# Patient Record
Sex: MALE | Race: WHITE | NOT HISPANIC OR LATINO | Employment: OTHER | URBAN - METROPOLITAN AREA
[De-identification: names, ages, dates, MRNs, and addresses within clinical notes are randomized per-mention and may not be internally consistent; named-entity substitution may affect disease eponyms.]

---

## 2017-06-22 ENCOUNTER — OFFICE VISIT (OUTPATIENT)
Dept: URGENT CARE | Facility: CLINIC | Age: 77
End: 2017-06-22
Payer: MEDICARE

## 2017-06-22 PROCEDURE — G0463 HOSPITAL OUTPT CLINIC VISIT: HCPCS

## 2017-06-22 PROCEDURE — 99203 OFFICE O/P NEW LOW 30 MIN: CPT

## 2017-06-24 ENCOUNTER — OFFICE VISIT (OUTPATIENT)
Dept: URGENT CARE | Facility: CLINIC | Age: 77
End: 2017-06-24
Payer: MEDICARE

## 2017-06-24 PROCEDURE — 99213 OFFICE O/P EST LOW 20 MIN: CPT

## 2017-06-24 PROCEDURE — G0463 HOSPITAL OUTPT CLINIC VISIT: HCPCS

## 2018-05-01 ENCOUNTER — OFFICE VISIT (OUTPATIENT)
Dept: URGENT CARE | Facility: CLINIC | Age: 78
End: 2018-05-01
Payer: MEDICARE

## 2018-05-01 VITALS
DIASTOLIC BLOOD PRESSURE: 85 MMHG | SYSTOLIC BLOOD PRESSURE: 156 MMHG | HEART RATE: 66 BPM | OXYGEN SATURATION: 95 % | TEMPERATURE: 97.6 F | RESPIRATION RATE: 18 BRPM

## 2018-05-01 DIAGNOSIS — R35.0 URINARY FREQUENCY: Primary | ICD-10-CM

## 2018-05-01 DIAGNOSIS — R30.0 DYSURIA: ICD-10-CM

## 2018-05-01 LAB
SL AMB  POCT GLUCOSE, UA: ABNORMAL
SL AMB LEUKOCYTE ESTERASE,UA: ABNORMAL
SL AMB POCT BILIRUBIN,UA: 0.2
SL AMB POCT BLOOD,UA: ABNORMAL
SL AMB POCT CLARITY,UA: ABNORMAL
SL AMB POCT COLOR,UA: ABNORMAL
SL AMB POCT KETONES,UA: ABNORMAL
SL AMB POCT NITRITE,UA: ABNORMAL
SL AMB POCT PH,UA: 6
SL AMB POCT SPECIFIC GRAVITY,UA: 1.02
SL AMB POCT URINE PROTEIN: ABNORMAL
SL AMB POCT UROBILINOGEN: ABNORMAL

## 2018-05-01 PROCEDURE — 87086 URINE CULTURE/COLONY COUNT: CPT | Performed by: NURSE PRACTITIONER

## 2018-05-01 PROCEDURE — 87077 CULTURE AEROBIC IDENTIFY: CPT | Performed by: NURSE PRACTITIONER

## 2018-05-01 PROCEDURE — 87186 SC STD MICRODIL/AGAR DIL: CPT | Performed by: NURSE PRACTITIONER

## 2018-05-01 PROCEDURE — 99213 OFFICE O/P EST LOW 20 MIN: CPT | Performed by: NURSE PRACTITIONER

## 2018-05-01 PROCEDURE — G0463 HOSPITAL OUTPT CLINIC VISIT: HCPCS | Performed by: NURSE PRACTITIONER

## 2018-05-01 PROCEDURE — 87147 CULTURE TYPE IMMUNOLOGIC: CPT | Performed by: NURSE PRACTITIONER

## 2018-05-01 RX ORDER — SULFAMETHOXAZOLE AND TRIMETHOPRIM 800; 160 MG/1; MG/1
1 TABLET ORAL 2 TIMES DAILY
Qty: 14 TABLET | Refills: 0 | Status: SHIPPED | OUTPATIENT
Start: 2018-05-01 | End: 2018-05-08

## 2018-05-01 RX ORDER — ASPIRIN 81 MG/1
81 TABLET, CHEWABLE ORAL
Status: ON HOLD | COMMUNITY
Start: 2017-07-22 | End: 2020-02-05 | Stop reason: SDUPTHER

## 2018-05-01 RX ORDER — ALLOPURINOL 300 MG/1
300 TABLET ORAL
COMMUNITY
Start: 2012-10-09 | End: 2020-02-20

## 2018-05-01 NOTE — PATIENT INSTRUCTIONS

## 2018-05-01 NOTE — PROGRESS NOTES
[facility]        NAME: Kia Del Cid is a 70 y o  male  : 1940    MRN: [id number]  DATE: May 1, 2018  TIME: 3:43 PM    Assessment and Plan   Urinary frequency [R35 0]  1  Urinary frequency  POCT urine dip    Urine culture   2  Dysuria  POCT urine dip    Urine culture    sulfamethoxazole-trimethoprim (BACTRIM DS) 800-160 mg per tablet         Patient Instructions       Follow up with PCP in 3-5 days  Proceed to  ER if symptoms worsen  Chief Complaint     Chief Complaint   Patient presents with    Urinary Frequency     Pt c/o urinary frequency since yesterday  History of Present Illness       Urinary Frequency    The current episode started yesterday  The problem occurs every urination  The problem has been unchanged  The quality of the pain is described as burning  There has been no fever  He is sexually active  There is no history of pyelonephritis  Associated symptoms include frequency, hematuria (just started) and urgency  Pertinent negatives include no flank pain, hesitancy or vomiting  Review of Systems   Review of Systems   Constitutional: Negative  Negative for fatigue  HENT: Negative  Eyes: Negative  Respiratory: Negative  Cardiovascular: Negative  Gastrointestinal: Negative  Negative for vomiting  Endocrine: Negative  Genitourinary: Positive for frequency, hematuria (just started) and urgency  Negative for flank pain and hesitancy  Musculoskeletal: Negative  Neurological: Negative  Hematological: Negative  Psychiatric/Behavioral: Negative            Current Medications       Current Outpatient Prescriptions:     allopurinol (ZYLOPRIM) 300 mg tablet, Take 300 mg by mouth, Disp: , Rfl:     aspirin 81 mg chewable tablet, Chew 81 mg, Disp: , Rfl:     metoprolol tartrate (LOPRESSOR) 25 mg tablet, Take by mouth, Disp: , Rfl:     sulfamethoxazole-trimethoprim (BACTRIM DS) 800-160 mg per tablet, Take 1 tablet by mouth 2 (two) times a day for 7 days, Disp: 14 tablet, Rfl: 0    Current Allergies     Allergies as of 05/01/2018 - Reviewed 05/01/2018   Allergen Reaction Noted    Lisinopril Anaphylaxis 04/16/2012    Bacitracin Rash 08/21/2014            The following portions of the patient's history were reviewed and updated as appropriate: allergies, current medications, past family history, past medical history, past social history, past surgical history and problem list      No past medical history on file  No past surgical history on file  No family history on file  Medications have been verified  Objective   /85   Pulse 66   Temp 97 6 °F (36 4 °C)   Resp 18   SpO2 95%        Physical Exam     Physical Exam   Constitutional: He appears well-developed and well-nourished  No distress  HENT:   Head: Normocephalic and atraumatic  Right Ear: External ear normal    Left Ear: External ear normal    Nose: Nose normal    Mouth/Throat: Oropharynx is clear and moist    Eyes: Conjunctivae and EOM are normal  Pupils are equal, round, and reactive to light  Neck: Normal range of motion  Neck supple  Pulmonary/Chest: Effort normal and breath sounds normal    Abdominal: Soft  Bowel sounds are normal  He exhibits no distension  There is tenderness (supra pubic)  Neurological: He is alert  He has normal reflexes  Skin: Skin is warm and dry  He is not diaphoretic  Psychiatric: He has a normal mood and affect  Nursing note and vitals reviewed

## 2018-05-03 LAB — BACTERIA UR CULT: ABNORMAL

## 2019-09-16 ENCOUNTER — OFFICE VISIT (OUTPATIENT)
Dept: URGENT CARE | Facility: CLINIC | Age: 79
End: 2019-09-16
Payer: MEDICARE

## 2019-09-16 VITALS
BODY MASS INDEX: 41.75 KG/M2 | SYSTOLIC BLOOD PRESSURE: 124 MMHG | RESPIRATION RATE: 20 BRPM | TEMPERATURE: 99 F | HEIGHT: 73 IN | DIASTOLIC BLOOD PRESSURE: 72 MMHG | HEART RATE: 72 BPM | OXYGEN SATURATION: 98 % | WEIGHT: 315 LBS

## 2019-09-16 DIAGNOSIS — R30.0 DYSURIA: ICD-10-CM

## 2019-09-16 DIAGNOSIS — N39.0 URINARY TRACT INFECTION WITH HEMATURIA, SITE UNSPECIFIED: Primary | ICD-10-CM

## 2019-09-16 DIAGNOSIS — R31.9 URINARY TRACT INFECTION WITH HEMATURIA, SITE UNSPECIFIED: Primary | ICD-10-CM

## 2019-09-16 LAB
SL AMB  POCT GLUCOSE, UA: NEGATIVE
SL AMB LEUKOCYTE ESTERASE,UA: ABNORMAL
SL AMB POCT BILIRUBIN,UA: NEGATIVE
SL AMB POCT BLOOD,UA: ABNORMAL
SL AMB POCT CLARITY,UA: ABNORMAL
SL AMB POCT COLOR,UA: YELLOW
SL AMB POCT KETONES,UA: NEGATIVE
SL AMB POCT NITRITE,UA: POSITIVE
SL AMB POCT PH,UA: 5
SL AMB POCT SPECIFIC GRAVITY,UA: 1.02
SL AMB POCT URINE PROTEIN: 100
SL AMB POCT UROBILINOGEN: 0.2

## 2019-09-16 PROCEDURE — 87086 URINE CULTURE/COLONY COUNT: CPT | Performed by: NURSE PRACTITIONER

## 2019-09-16 PROCEDURE — G0463 HOSPITAL OUTPT CLINIC VISIT: HCPCS | Performed by: NURSE PRACTITIONER

## 2019-09-16 PROCEDURE — 99213 OFFICE O/P EST LOW 20 MIN: CPT | Performed by: NURSE PRACTITIONER

## 2019-09-16 PROCEDURE — 87186 SC STD MICRODIL/AGAR DIL: CPT | Performed by: NURSE PRACTITIONER

## 2019-09-16 PROCEDURE — 87077 CULTURE AEROBIC IDENTIFY: CPT | Performed by: NURSE PRACTITIONER

## 2019-09-16 RX ORDER — SULFAMETHOXAZOLE AND TRIMETHOPRIM 800; 160 MG/1; MG/1
1 TABLET ORAL EVERY 12 HOURS SCHEDULED
Qty: 14 TABLET | Refills: 0 | Status: SHIPPED | OUTPATIENT
Start: 2019-09-16 | End: 2019-09-23

## 2019-09-16 NOTE — PROGRESS NOTES
[facility]        NAME: Yara Hogan is a 70 y o  male  : 1940    MRN: [id number]  DATE: 2019  TIME: 11:08 AM    Assessment and Plan   Urinary tract infection with hematuria, site unspecified [N39 0, R31 9]  1  Urinary tract infection with hematuria, site unspecified  sulfamethoxazole-trimethoprim (BACTRIM DS) 800-160 mg per tablet   2  Dysuria  POCT urine dip auto non-scope    Urine culture    CANCELED: POCT urine dip         Patient Instructions     Patient Instructions     Take the bactrim as ordered until completed  Eat yogurt or take a probiotic to restore good bacteria to your gut  Urinary Tract Infection in Men   AMBULATORY CARE:   A urinary tract infection (UTI)  is caused by bacteria that get inside your urinary tract  Most bacteria that enter your urinary tract come out when you urinate  If the bacteria stay in your urinary tract, you may get an infection  Your urinary tract includes your kidneys, ureters, bladder, and urethra  Urine is made in your kidneys, and it flows from the ureters to the bladder  Urine leaves the bladder through the urethra  A UTI is more common in your lower urinary tract, which includes your bladder and urethra  Common symptoms include the following:   · Urinating more often than usual, leaking urine, or waking from sleep to urinate    · Pain or burning when you urinate    · Pain or pressure in your lower abdomen or back    · Urine that smells bad    · Blood in your urine  Seek care immediately if:   · You are urinating very little or not at all  · You have a high fever with shaking chills  · You have side or back pain that gets worse  Contact your healthcare provider if:   · You have a mild fever  · You do not feel better after 2 days of taking antibiotics  · You are vomiting  · You have new symptoms, such as blood or pus in your urine  · You have questions or concerns about your condition or care    Treatment for a UTI  may include medicines to treat a bacterial infection  You may also need medicines to decrease pain and burning, or decrease the urge to urinate often  Prevent a UTI:   · Empty your bladder often  Urinate and empty your bladder as soon as you feel the need  Do not hold your urine for long periods of time  · Drink liquids as directed  Ask how much liquid to drink each day and which liquids are best for you  You may need to drink more liquids than usual to help flush out the bacteria  Do not drink alcohol, caffeine, or citrus juices  These can irritate your bladder and increase your symptoms  Your healthcare provider may recommend cranberry juice to help prevent a UTI  · Urinate after you have sex  This can help flush out bacteria passed during sex  · Do pelvic muscle exercises often  Pelvic muscle exercises may help you start and stop urinating  Strong pelvic muscles may help you empty your bladder easier  Squeeze these muscles tightly for 5 seconds like you are trying to hold back urine  Then relax for 5 seconds  Gradually work up to squeezing for 10 seconds  Do 3 sets of 15 repetitions a day, or as directed  Follow up with your healthcare provider as directed:  Write down your questions so you remember to ask them during your visits  © 2017 [employer]  Information is for End User's use only and may not be sold, redistributed or otherwise used for commercial purposes  All illustrations and images included in CareNotes® are the copyrighted property of [employer]  or David France  The above information is an  only  It is not intended as medical advice for individual conditions or treatments  Talk to your doctor, nurse or pharmacist before following any medical regimen to see if it is safe and effective for you  Follow up with PCP in 3-5 days  Proceed to  ER if symptoms worsen      Chief Complaint     Chief Complaint   Patient presents with   [employer] Possible UTI     burning and frequency of urination for 1 day         History of Present Illness       Patient reported burning and frequency starting last night  He states he does not get urinary infections often but has had them before, so he recognized the symptoms  He presents here to be seen      Review of Systems   Review of Systems   Genitourinary: Positive for dysuria and frequency  Negative for discharge, flank pain, hematuria and urgency  All other systems reviewed and are negative          Current Medications       Current Outpatient Medications:     amLODIPine (NORVASC) 5 mg tablet, Take 5 mg by mouth daily, Disp: , Rfl:     aspirin 81 mg chewable tablet, Chew 81 mg, Disp: , Rfl:     atorvastatin (LIPITOR) 40 mg tablet, Take 40 mg by mouth daily, Disp: , Rfl:     cholecalciferol (VITAMIN D3) 1,000 units tablet, Take 1,000 Units by mouth daily, Disp: , Rfl:     Meredith, Zingiber officinalis, (MEREDITH ROOT PO), Take by mouth, Disp: , Rfl:     metFORMIN (GLUCOPHAGE) 500 mg tablet, Take 500 mg by mouth, Disp: , Rfl:     metoprolol tartrate (LOPRESSOR) 25 mg tablet, Take by mouth, Disp: , Rfl:     omega-3-acid ethyl esters (LOVAZA) 1 g capsule, Take 2 g by mouth 2 (two) times a day, Disp: , Rfl:     allopurinol (ZYLOPRIM) 300 mg tablet, Take 300 mg by mouth, Disp: , Rfl:     sulfamethoxazole-trimethoprim (BACTRIM DS) 800-160 mg per tablet, Take 1 tablet by mouth every 12 (twelve) hours for 7 days, Disp: 14 tablet, Rfl: 0    Current Allergies     Allergies as of 09/16/2019 - Reviewed 09/16/2019   Allergen Reaction Noted    Lisinopril Anaphylaxis 04/16/2012    Bacitracin Rash 08/21/2014            The following portions of the patient's history were reviewed and updated as appropriate: allergies, current medications, past family history, past medical history, past social history, past surgical history and problem list      Past Medical History:   Diagnosis Date    Diabetes ([address] )     [facility] [address] [address] [facility]     Hypertension     TIA (transient ischemic attack)        Past Surgical History:   Procedure Laterality Date    REPLACEMENT TOTAL KNEE BILATERAL      SINUS SURGERY      TONSILLECTOMY         History reviewed  No pertinent family history  Medications have been verified  Objective   /72   Pulse 72   Temp 99 °F (37 2 °C) (Tympanic)   Resp 20   Ht 6' 1" (1 854 m)   Wt (!) 145 kg (319 lb)   SpO2 98%   BMI 42 09 kg/m²        Physical Exam     Physical Exam   Constitutional: He is oriented to person, place, and time  He appears well-developed and well-nourished  No distress  HENT:   Head: Normocephalic and atraumatic  Abdominal: Soft  He exhibits no distension  There is no tenderness  There is no CVA tenderness  Musculoskeletal: Normal range of motion  Neurological: He is alert and oriented to person, place, and time  Skin: Skin is warm and dry  Capillary refill takes less than 2 seconds  He is not diaphoretic  Psychiatric: He has a normal mood and affect  His behavior is normal  Judgment and thought content normal    Nursing note and vitals reviewed

## 2019-09-16 NOTE — PATIENT INSTRUCTIONS
Take the bactrim as ordered until completed  Eat yogurt or take a probiotic to restore good bacteria to your gut  Urinary Tract Infection in Men   AMBULATORY CARE:   A urinary tract infection (UTI)  is caused by bacteria that get inside your urinary tract  Most bacteria that enter your urinary tract come out when you urinate  If the bacteria stay in your urinary tract, you may get an infection  Your urinary tract includes your kidneys, ureters, bladder, and urethra  Urine is made in your kidneys, and it flows from the ureters to the bladder  Urine leaves the bladder through the urethra  A UTI is more common in your lower urinary tract, which includes your bladder and urethra  Common symptoms include the following:   · Urinating more often than usual, leaking urine, or waking from sleep to urinate    · Pain or burning when you urinate    · Pain or pressure in your lower abdomen or back    · Urine that smells bad    · Blood in your urine  Seek care immediately if:   · You are urinating very little or not at all  · You have a high fever with shaking chills  · You have side or back pain that gets worse  Contact your healthcare provider if:   · You have a mild fever  · You do not feel better after 2 days of taking antibiotics  · You are vomiting  · You have new symptoms, such as blood or pus in your urine  · You have questions or concerns about your condition or care  Treatment for a UTI  may include medicines to treat a bacterial infection  You may also need medicines to decrease pain and burning, or decrease the urge to urinate often  Prevent a UTI:   · Empty your bladder often  Urinate and empty your bladder as soon as you feel the need  Do not hold your urine for long periods of time  · Drink liquids as directed  Ask how much liquid to drink each day and which liquids are best for you  You may need to drink more liquids than usual to help flush out the bacteria   Do not drink alcohol, caffeine, or citrus juices  These can irritate your bladder and increase your symptoms  Your healthcare provider may recommend cranberry juice to help prevent a UTI  · Urinate after you have sex  This can help flush out bacteria passed during sex  · Do pelvic muscle exercises often  Pelvic muscle exercises may help you start and stop urinating  Strong pelvic muscles may help you empty your bladder easier  Squeeze these muscles tightly for 5 seconds like you are trying to hold back urine  Then relax for 5 seconds  Gradually work up to squeezing for 10 seconds  Do 3 sets of 15 repetitions a day, or as directed  Follow up with your healthcare provider as directed:  Write down your questions so you remember to ask them during your visits  © 2017 [facility] Information is for End User's use only and may not be sold, redistributed or otherwise used for commercial purposes  All illustrations and images included in CareNotes® are the copyrighted property of A [employer] A [employer]  or David France  The above information is an  only  It is not intended as medical advice for individual conditions or treatments  Talk to your doctor, nurse or pharmacist before following any medical regimen to see if it is safe and effective for you

## 2019-09-18 LAB — BACTERIA UR CULT: ABNORMAL

## 2019-09-19 ENCOUNTER — TELEPHONE (OUTPATIENT)
Dept: URGENT CARE | Facility: CLINIC | Age: 79
End: 2019-09-19

## 2019-09-19 DIAGNOSIS — R31.9 URINARY TRACT INFECTION WITH HEMATURIA, SITE UNSPECIFIED: Primary | ICD-10-CM

## 2019-09-19 DIAGNOSIS — N39.0 URINARY TRACT INFECTION WITH HEMATURIA, SITE UNSPECIFIED: Primary | ICD-10-CM

## 2019-09-19 RX ORDER — CIPROFLOXACIN 500 MG/1
500 TABLET, FILM COATED ORAL EVERY 12 HOURS SCHEDULED
Qty: 10 TABLET | Refills: 0 | Status: SHIPPED | OUTPATIENT
Start: 2019-09-19 | End: 2019-09-24

## 2019-09-19 NOTE — TELEPHONE ENCOUNTER
Spoke with patient and informed that culture showed resistance to the bactrim I ordered, as well as several other medications  He states he has still been having uti symptoms and was planning to come back tomorrow  I apologized that he was still symptomatic, explained that bactrim generally works, but in this case there was resistance  He expressed his understanding and asked that I phone something in  I let him know that I just sent the order through the computer and it will be at the pharmacy  He stated his understanding and thanked me

## 2020-01-16 ENCOUNTER — HOSPITAL ENCOUNTER (EMERGENCY)
Facility: HOSPITAL | Age: 80
Discharge: HOME | End: 2020-01-16
Attending: EMERGENCY MEDICINE | Admitting: EMERGENCY MEDICINE
Payer: MEDICARE

## 2020-01-16 VITALS
BODY MASS INDEX: 39.17 KG/M2 | OXYGEN SATURATION: 97 % | RESPIRATION RATE: 18 BRPM | DIASTOLIC BLOOD PRESSURE: 71 MMHG | HEIGHT: 75 IN | SYSTOLIC BLOOD PRESSURE: 141 MMHG | WEIGHT: 315 LBS | TEMPERATURE: 97.5 F | HEART RATE: 70 BPM

## 2020-01-16 DIAGNOSIS — L03.90 CELLULITIS: Primary | ICD-10-CM

## 2020-01-16 PROCEDURE — 99284 EMERGENCY DEPT VISIT MOD MDM: CPT | Performed by: EMERGENCY MEDICINE

## 2020-01-16 PROCEDURE — 99283 EMERGENCY DEPT VISIT LOW MDM: CPT

## 2020-01-16 RX ORDER — DOXYCYCLINE HYCLATE 100 MG/1
100 CAPSULE ORAL EVERY 12 HOURS SCHEDULED
Qty: 20 CAPSULE | Refills: 0 | Status: SHIPPED | OUTPATIENT
Start: 2020-01-16 | End: 2020-01-23

## 2020-01-16 RX ORDER — DOXYCYCLINE HYCLATE 50 MG/1
100 CAPSULE ORAL ONCE
Status: COMPLETED | OUTPATIENT
Start: 2020-01-16 | End: 2020-01-16

## 2020-01-16 RX ADMIN — DOXYCYCLINE HYCLATE 100 MG: 50 CAPSULE ORAL at 14:38

## 2020-01-16 NOTE — ED PROVIDER NOTES
History  Chief Complaint   Patient presents with    Cellulitis     Patient states that he cellutlitis on the left lower leg  Patient reports started about 2 days     With rash to the left le, states just like his previous cellulitis he gets once a year  States 9/19 he had hao problem, had US ro DVT which was negative at that time  No hx of dvt, pe  No cp, sob, fever, chill, n/v/d  States the rash started as itch and then became red  This is same presentation of previous flairs  states he feels well otherwise  Sx for 2- 3 days  Denies claudication sx  Prior to Admission Medications   Prescriptions Last Dose Informant Patient Reported? Taking? Meredith, Zingiber officinalis, (MEREDITH ROOT PO)  Self Yes No   Sig: Take by mouth   allopurinol (ZYLOPRIM) 300 mg tablet  Self Yes No   Sig: Take 300 mg by mouth   amLODIPine (NORVASC) 5 mg tablet  Self Yes No   Sig: Take 5 mg by mouth daily   aspirin 81 mg chewable tablet  Self Yes No   Sig: Chew 81 mg   atorvastatin (LIPITOR) 40 mg tablet  Self Yes No   Sig: Take 40 mg by mouth daily   cholecalciferol (VITAMIN D3) 1,000 units tablet  Self Yes No   Sig: Take 1,000 Units by mouth daily   metFORMIN (GLUCOPHAGE) 500 mg tablet  Self Yes No   Sig: Take 500 mg by mouth   metoprolol tartrate (LOPRESSOR) 25 mg tablet  Self Yes No   Sig: Take by mouth   omega-3-acid ethyl esters (LOVAZA) 1 g capsule  Self Yes No   Sig: Take 2 g by mouth 2 (two) times a day      Facility-Administered Medications: None       Past Medical History:   Diagnosis Date    [facility] [address] [facility]     [facility]     Hypertension     TIA (transient ischemic attack)        Past Surgical History:   Procedure Laterality Date    REPLACEMENT TOTAL KNEE BILATERAL      SINUS SURGERY      TONSILLECTOMY         History reviewed  No pertinent family history  I have reviewed and agree with the history as documented      Social History     Tobacco Use    Smoking status: Never Smoker    Smokeless tobacco: Never Used   Substance Use Topics    Alcohol use: Never     Frequency: Monthly or less    Drug use: Never        Review of Systems   All other systems reviewed and are negative  Physical Exam  Physical Exam   Constitutional: He is oriented to person, place, and time  No distress  HENT:   Head: Normocephalic and atraumatic  Right Ear: External ear normal    Left Ear: External ear normal    Nose: Nose normal    Mouth/Throat: Oropharynx is clear and moist  No oropharyngeal exudate  Eyes: Pupils are equal, round, and reactive to light  Conjunctivae and EOM are normal  Right eye exhibits no discharge  Left eye exhibits no discharge  Neck: Normal range of motion  Neck supple  No JVD present  No tracheal deviation present  Cardiovascular: Normal heart sounds and intact distal pulses  Exam reveals no gallop and no friction rub  No murmur heard  Pulmonary/Chest: No stridor  No respiratory distress  He has no wheezes  He has no rales  He exhibits no tenderness  Abdominal: Soft  Bowel sounds are normal  He exhibits no distension and no mass  There is no tenderness  There is no rebound and no guarding  No hernia  Musculoskeletal: Normal range of motion  He exhibits no edema, tenderness or deformity  One plus edema to the left le  There is erythremia with a slight clear oozing to the left ant lower shin  Entire area is about size of 1/2 dollar bill  Another smaller area, about half the size to the left LE posteriorly with similar appearance  Well perfused LE  Neurological: He is alert and oriented to person, place, and time  He displays normal reflexes  No sensory deficit  He exhibits normal muscle tone  Skin: Skin is warm and dry  Capillary refill takes less than 2 seconds  No rash noted  He is not diaphoretic  No pallor  Psychiatric: He has a normal mood and affect         Vital Signs  ED Triage Vitals [01/16/20 1415]   Temperature Pulse Respirations Blood Pressure SpO2   97 5 °F (36 4 °C) 70 18 141/71 97 %      Temp Source Heart Rate Source Patient Position - Orthostatic VS BP Location FiO2 (%)   Temporal Monitor Lying Left arm --      Pain Score       No Pain           Vitals:    01/16/20 1415   BP: 141/71   Pulse: 70   Patient Position - Orthostatic VS: Lying         Visual Acuity      ED Medications  Medications   doxycycline hyclate (VIBRAMYCIN) capsule 100 mg (100 mg Oral Given 1/16/20 1438)       Diagnostic Studies  Results Reviewed     None                 No orders to display              Procedures  Procedures         ED Course                               MDM  Number of Diagnoses or Management Options  Diagnosis management comments: Cellulitis much like his previous episodes of cellulitis  No hx of dvt or pe  Us 9/19 negative dvt on same leg for same sx  No systemic features, specificaly does not feel sick, no fever, chill, n/v  No uti sx  Hx of mrsa, will start mrsa coverage  Red flags discussed and he has voiced understanding on when to return to er and for the need to fu / med SE  Disposition  Final diagnoses:   Cellulitis     Time reflects when diagnosis was documented in both MDM as applicable and the Disposition within this note     Time User Action Codes Description Comment    1/16/2020  2:29 PM Drake Gamez Add [L03 90] Cellulitis       ED Disposition     ED Disposition Condition Date/Time Comment    Discharge Stable Thu Jan 16, 2020  2:29 PM Odin Lewis discharge to home/self care              Follow-up Information     Follow up With Specialties Details Why [facility],  Family Medicine Schedule an appointment as soon as possible for a visit in 3 days  [address] [place] [address])            Discharge Medication List as of 1/16/2020  2:33 PM      START taking these medications    Details   doxycycline hyclate (VIBRAMYCIN) 100 mg capsule Take 1 capsule (100 mg total) by mouth every 12 (twelve) hours for 7 days, Starting Thu 1/16/2020, Until u 1/23/2020, Normal         CONTINUE these medications which have NOT CHANGED    Details   allopurinol (ZYLOPRIM) 300 mg tablet Take 300 mg by mouth, Starting Tue 10/9/2012, Historical Med      amLODIPine (NORVASC) 5 mg tablet Take 5 mg by mouth daily, Historical Med      aspirin 81 mg chewable tablet Chew 81 mg, Starting Sat 7/22/2017, Historical Med      atorvastatin (LIPITOR) 40 mg tablet Take 40 mg by mouth daily, Historical Med      cholecalciferol (VITAMIN D3) 1,000 units tablet Take 1,000 Units by mouth daily, Historical Med      Meredith, Zingiber officinalis, (MEREDITH ROOT PO) Take by mouth, Historical Med      metFORMIN (GLUCOPHAGE) 500 mg tablet Take 500 mg by mouth, Historical Med      metoprolol tartrate (LOPRESSOR) 25 mg tablet Take by mouth, Historical Med      omega-3-acid ethyl esters (LOVAZA) 1 g capsule Take 2 g by mouth 2 (two) times a day, Historical Med           No discharge procedures on file      ED Provider  Electronically Signed by           Adán Otto MD  01/16/20 4152

## 2020-01-16 NOTE — DISCHARGE INSTRUCTIONS
Return to er if the area becomes more red or swollen, you start to feel sick or have fever or if you have chest pain /shortness of breath at any time/

## 2020-02-04 ENCOUNTER — APPOINTMENT (EMERGENCY)
Dept: CT IMAGING | Facility: HOSPITAL | Age: 80
DRG: 069 | End: 2020-02-04
Payer: MEDICARE

## 2020-02-04 ENCOUNTER — HOSPITAL ENCOUNTER (INPATIENT)
Facility: HOSPITAL | Age: 80
LOS: 1 days | Discharge: HOME | DRG: 069 | End: 2020-02-05
Attending: EMERGENCY MEDICINE | Admitting: INTERNAL MEDICINE
Payer: MEDICARE

## 2020-02-04 ENCOUNTER — APPOINTMENT (EMERGENCY)
Dept: RADIOLOGY | Facility: HOSPITAL | Age: 80
DRG: 069 | End: 2020-02-04
Payer: MEDICARE

## 2020-02-04 DIAGNOSIS — G45.9 TIA (TRANSIENT ISCHEMIC ATTACK): Primary | ICD-10-CM

## 2020-02-04 PROBLEM — E11.9 DIABETES MELLITUS (HCC): Status: ACTIVE | Noted: 2020-02-04

## 2020-02-04 PROBLEM — E11.9 TYPE 2 DIABETES MELLITUS WITHOUT COMPLICATION (HCC): Chronic | Status: ACTIVE | Noted: 2019-04-29

## 2020-02-04 PROBLEM — M10.9 GOUT: Chronic | Status: ACTIVE | Noted: 2020-02-04

## 2020-02-04 PROBLEM — K21.9 GASTROESOPHAGEAL REFLUX DISEASE: Status: ACTIVE | Noted: 2020-02-04

## 2020-02-04 PROBLEM — E11.9 TYPE 2 DIABETES MELLITUS WITHOUT COMPLICATION (HCC): Status: ACTIVE | Noted: 2019-04-29

## 2020-02-04 PROBLEM — M10.9 GOUT: Status: ACTIVE | Noted: 2020-02-04

## 2020-02-04 LAB
ANION GAP SERPL CALCULATED.3IONS-SCNC: 8 MMOL/L (ref 4–13)
APTT PPP: 37 SECONDS (ref 23–37)
ATRIAL RATE: 71 BPM
BASOPHILS # BLD AUTO: 0 THOUSANDS/ΜL (ref 0–0.1)
BASOPHILS NFR BLD AUTO: 0 % (ref 0–2)
BUN SERPL-MCNC: 18 MG/DL (ref 7–25)
CALCIUM SERPL-MCNC: 9.5 MG/DL (ref 8.6–10.5)
CHLORIDE SERPL-SCNC: 107 MMOL/L (ref 98–107)
CO2 SERPL-SCNC: 25 MMOL/L (ref 21–31)
CREAT SERPL-MCNC: 1.21 MG/DL (ref 0.7–1.3)
EOSINOPHIL # BLD AUTO: 0.4 THOUSAND/ΜL (ref 0–0.61)
EOSINOPHIL NFR BLD AUTO: 5 % (ref 0–5)
ERYTHROCYTE [DISTWIDTH] IN BLOOD BY AUTOMATED COUNT: 14.1 % (ref 11.5–14.5)
ERYTHROCYTE [DISTWIDTH] IN BLOOD BY AUTOMATED COUNT: 14.2 % (ref 11.5–14.5)
ERYTHROCYTE [SEDIMENTATION RATE] IN BLOOD: 10 MM/HOUR (ref 0–20)
GFR SERPL CREATININE-BSD FRML MDRD: 57 ML/MIN/1.73SQ M
GLUCOSE SERPL-MCNC: 134 MG/DL (ref 65–140)
GLUCOSE SERPL-MCNC: 147 MG/DL (ref 65–140)
GLUCOSE SERPL-MCNC: 159 MG/DL (ref 65–99)
HCT VFR BLD AUTO: 46.1 % (ref 42–47)
HCT VFR BLD AUTO: 46.3 % (ref 42–47)
HGB BLD-MCNC: 15.6 G/DL (ref 14–18)
HGB BLD-MCNC: 15.7 G/DL (ref 14–18)
INR PPP: 1.09 (ref 0.9–1.5)
LYMPHOCYTES # BLD AUTO: 1.2 THOUSANDS/ΜL (ref 0.6–4.47)
LYMPHOCYTES NFR BLD AUTO: 17 % (ref 21–51)
MCH RBC QN AUTO: 30.7 PG (ref 26–34)
MCH RBC QN AUTO: 30.9 PG (ref 26–34)
MCHC RBC AUTO-ENTMCNC: 33.8 G/DL (ref 31–37)
MCHC RBC AUTO-ENTMCNC: 33.9 G/DL (ref 31–37)
MCV RBC AUTO: 91 FL (ref 81–99)
MCV RBC AUTO: 91 FL (ref 81–99)
MONOCYTES # BLD AUTO: 0.5 THOUSAND/ΜL (ref 0.17–1.22)
MONOCYTES NFR BLD AUTO: 7 % (ref 2–12)
NEUTROPHILS # BLD AUTO: 5.2 THOUSANDS/ΜL (ref 1.4–6.5)
NEUTS SEG NFR BLD AUTO: 71 % (ref 42–75)
P AXIS: 42 DEGREES
PLATELET # BLD AUTO: 175 THOUSANDS/UL (ref 149–390)
PLATELET # BLD AUTO: 178 THOUSANDS/UL (ref 149–390)
PMV BLD AUTO: 8.6 FL (ref 8.6–11.7)
PMV BLD AUTO: 9.5 FL (ref 8.6–11.7)
POTASSIUM SERPL-SCNC: 4.2 MMOL/L (ref 3.5–5.5)
PR INTERVAL: 210 MS
PROTHROMBIN TIME: 12.7 SECONDS (ref 10.2–13)
QRS AXIS: -61 DEGREES
QRSD INTERVAL: 146 MS
QT INTERVAL: 446 MS
QTC INTERVAL: 484 MS
RBC # BLD AUTO: 5.07 MILLION/UL (ref 4.3–5.9)
RBC # BLD AUTO: 5.09 MILLION/UL (ref 4.3–5.9)
SODIUM SERPL-SCNC: 140 MMOL/L (ref 134–143)
T WAVE AXIS: 78 DEGREES
TROPONIN I SERPL-MCNC: <0.03 NG/ML
VENTRICULAR RATE: 71 BPM
WBC # BLD AUTO: 7.3 THOUSAND/UL (ref 4.8–10.8)
WBC # BLD AUTO: 7.3 THOUSAND/UL (ref 4.8–10.8)

## 2020-02-04 PROCEDURE — 1124F ACP DISCUSS-NO DSCNMKR DOCD: CPT | Performed by: PSYCHIATRY & NEUROLOGY

## 2020-02-04 PROCEDURE — 82948 REAGENT STRIP/BLOOD GLUCOSE: CPT

## 2020-02-04 PROCEDURE — 71045 X-RAY EXAM CHEST 1 VIEW: CPT

## 2020-02-04 PROCEDURE — 93005 ELECTROCARDIOGRAM TRACING: CPT

## 2020-02-04 PROCEDURE — 85730 THROMBOPLASTIN TIME PARTIAL: CPT | Performed by: EMERGENCY MEDICINE

## 2020-02-04 PROCEDURE — 85652 RBC SED RATE AUTOMATED: CPT | Performed by: EMERGENCY MEDICINE

## 2020-02-04 PROCEDURE — 85610 PROTHROMBIN TIME: CPT | Performed by: EMERGENCY MEDICINE

## 2020-02-04 PROCEDURE — 36415 COLL VENOUS BLD VENIPUNCTURE: CPT | Performed by: EMERGENCY MEDICINE

## 2020-02-04 PROCEDURE — 99285 EMERGENCY DEPT VISIT HI MDM: CPT

## 2020-02-04 PROCEDURE — 70496 CT ANGIOGRAPHY HEAD: CPT

## 2020-02-04 PROCEDURE — 84484 ASSAY OF TROPONIN QUANT: CPT | Performed by: EMERGENCY MEDICINE

## 2020-02-04 PROCEDURE — 93010 ELECTROCARDIOGRAM REPORT: CPT | Performed by: INTERNAL MEDICINE

## 2020-02-04 PROCEDURE — 80048 BASIC METABOLIC PNL TOTAL CA: CPT | Performed by: EMERGENCY MEDICINE

## 2020-02-04 PROCEDURE — 85025 COMPLETE CBC W/AUTO DIFF WBC: CPT | Performed by: EMERGENCY MEDICINE

## 2020-02-04 PROCEDURE — 70498 CT ANGIOGRAPHY NECK: CPT

## 2020-02-04 PROCEDURE — 99223 1ST HOSP IP/OBS HIGH 75: CPT | Performed by: PHYSICIAN ASSISTANT

## 2020-02-04 PROCEDURE — G0427 INPT/ED TELECONSULT70: HCPCS | Performed by: PSYCHIATRY & NEUROLOGY

## 2020-02-04 PROCEDURE — 99291 CRITICAL CARE FIRST HOUR: CPT | Performed by: EMERGENCY MEDICINE

## 2020-02-04 RX ORDER — CHLORAL HYDRATE 500 MG
2000 CAPSULE ORAL 2 TIMES DAILY
Status: DISCONTINUED | OUTPATIENT
Start: 2020-02-04 | End: 2020-02-05 | Stop reason: HOSPADM

## 2020-02-04 RX ORDER — ASPIRIN 81 MG/1
324 TABLET, CHEWABLE ORAL ONCE
Status: COMPLETED | OUTPATIENT
Start: 2020-02-04 | End: 2020-02-04

## 2020-02-04 RX ORDER — ATORVASTATIN CALCIUM 80 MG/1
80 TABLET, FILM COATED ORAL EVERY EVENING
Status: DISCONTINUED | OUTPATIENT
Start: 2020-02-04 | End: 2020-02-05 | Stop reason: HOSPADM

## 2020-02-04 RX ORDER — LORAZEPAM 2 MG/ML
1 INJECTION INTRAMUSCULAR ONCE
Status: COMPLETED | OUTPATIENT
Start: 2020-02-04 | End: 2020-02-04

## 2020-02-04 RX ORDER — AMLODIPINE BESYLATE 5 MG/1
5 TABLET ORAL DAILY
Status: DISCONTINUED | OUTPATIENT
Start: 2020-02-05 | End: 2020-02-05 | Stop reason: HOSPADM

## 2020-02-04 RX ORDER — ALLOPURINOL 300 MG/1
300 TABLET ORAL DAILY
Status: DISCONTINUED | OUTPATIENT
Start: 2020-02-05 | End: 2020-02-05 | Stop reason: HOSPADM

## 2020-02-04 RX ORDER — CLOPIDOGREL BISULFATE 75 MG/1
75 TABLET ORAL DAILY
Status: DISCONTINUED | OUTPATIENT
Start: 2020-02-04 | End: 2020-02-05 | Stop reason: HOSPADM

## 2020-02-04 RX ORDER — MELATONIN
1000 DAILY
Status: DISCONTINUED | OUTPATIENT
Start: 2020-02-05 | End: 2020-02-05 | Stop reason: HOSPADM

## 2020-02-04 RX ORDER — ASPIRIN 81 MG/1
81 TABLET, CHEWABLE ORAL DAILY
Status: DISCONTINUED | OUTPATIENT
Start: 2020-02-05 | End: 2020-02-05 | Stop reason: HOSPADM

## 2020-02-04 RX ADMIN — LORAZEPAM 1 MG: 2 INJECTION INTRAMUSCULAR; INTRAVENOUS at 18:45

## 2020-02-04 RX ADMIN — METOPROLOL TARTRATE 25 MG: 25 TABLET ORAL at 22:07

## 2020-02-04 RX ADMIN — ENOXAPARIN SODIUM 40 MG: 40 INJECTION SUBCUTANEOUS at 18:43

## 2020-02-04 RX ADMIN — ATORVASTATIN CALCIUM 80 MG: 80 TABLET, FILM COATED ORAL at 18:44

## 2020-02-04 RX ADMIN — OMEGA-3 FATTY ACIDS CAP 1000 MG 2000 MG: 1000 CAP at 18:44

## 2020-02-04 RX ADMIN — IOHEXOL 85 ML: 350 INJECTION, SOLUTION INTRAVENOUS at 13:43

## 2020-02-04 RX ADMIN — CLOPIDOGREL BISULFATE 75 MG: 75 TABLET ORAL at 18:44

## 2020-02-04 RX ADMIN — ASPIRIN 81 MG 324 MG: 81 TABLET ORAL at 14:33

## 2020-02-04 NOTE — ASSESSMENT & PLAN NOTE
No results found for: HGBA1C    Recent Labs     02/04/20  1322   POCGLU 147*       Blood Sugar Average: Last 72 hrs:  (P) 147   · Hold metformin  · SSI coverage  · Check A1C

## 2020-02-04 NOTE — TELEMEDICINE
TeleConsultation - Stroke   Vidal Eason 70 y o  male MRN: [id number]   Encounter: [id number]      REQUIRED DOCUMENTATION:     1  This service was provided via Telemedicine  2  Provider located at home  3  TeleMed provider: Ramila Ha MD   4  Identify all parties in room with patient during tele consult:  RN  5  After connecting through [employer], patient was identified by name and date of birth and assistant checked wristband  Patient was then informed that this was a Telemedicine visit and that the exam was being conducted confidentially over secure lines  My office door was closed  No one else was in the room  Patient acknowledged consent and understanding of privacy and security of the Telemedicine visit, and gave us permission to have the assistant stay in the room in order to assist with the history and to conduct the exam   I informed the patient that I have reviewed their record in Epic and presented the opportunity for them to ask any questions regarding the visit today  The patient agreed to participate  Assessment/Plan   Assessment: Transient left arm weakness and numbness:  Likely ischemic in nature  CTA shows extra- and intracranial disease, mainly in vertebral arteries  TPA Decision: Patient not a TPA candidate  Symptoms resolved/clearly non disabling  Plan:   Admit to stroke pathway  MRI brain w/o contrast, TTE, lipid panel and HgbA1c  Aspirin 81 mg + Plavix 75 mg daily  Increase Lipitor to 80 mg QHS  PT/OT evaluation  BP is permissive first 24 hours, as long as systolic dose not exceed 473 mmHg  History of Present Illness     Reason for Consult / Principal Problem: Left sided weakness  Hx and PE limited by: none  Patient last known well: around 1:00 pm  Stroke alert called: 1:46 pm  Neurology time of arrival: immediate via phone  HPI: Vidal Eason is a 70 y o  male who presents with left hand weakness and numbness that started suddenly   He could not hold anything in his left hand without dropping it  There was no leg weakness, dysarthria, or unsteadiness  He denied prior illness, or similar events  Patient has history of 3 MI in the past and multiple coronary stents  He is on aspirin 81 mg daily  Patient snores at night, but his sleep is refreshing  He is complaint with meds  Consult to Neurology  Consult performed by: Sridhar Ardon MD  Consult ordered by: Dayron Clark MD          Review of Systems  Complete ROS was done and is negative other than what is mentioned in HPI    Historical Information   Past Medical History:   Diagnosis Date    [facility] [address]     [facility] [address] [facility]     Hyperlipidemia     Hypertension     Kidney stone     TIA (transient ischemic attack)      Past Surgical History:   Procedure Laterality Date    CORONARY ANGIOPLASTY WITH STENT PLACEMENT      REPLACEMENT TOTAL KNEE BILATERAL      SINUS SURGERY      TONSILLECTOMY       Social History   Social History     Substance and Sexual Activity   Alcohol Use Never    Frequency: Monthly or less     Social History     Substance and Sexual Activity   Drug Use Never     Social History     Tobacco Use   Smoking Status Never Smoker   Smokeless Tobacco Never Used     Family History: non-contributory    Review of previous medical records was completed  Meds/Allergies   PTA meds:   Prior to Admission Medications   Prescriptions Last Dose Informant Patient Reported? Taking?    Meredith, Zingiber officinalis, (MEREDITH ROOT PO)  Self Yes Yes   Sig: Take by mouth   allopurinol (ZYLOPRIM) 300 mg tablet  Self Yes No   Sig: Take 300 mg by mouth   amLODIPine (NORVASC) 5 mg tablet  Self Yes Yes   Sig: Take 5 mg by mouth daily   aspirin 81 mg chewable tablet 2/4/2020 at Unknown time Self Yes Yes   Sig: Chew 81 mg   atorvastatin (LIPITOR) 40 mg tablet  Self Yes Yes   Sig: Take 40 mg by mouth daily   cholecalciferol (VITAMIN D3) 1,000 units tablet  Self Yes Yes   Sig: Take 1,000 Units by mouth daily   metFORMIN (GLUCOPHAGE) 500 mg tablet 2/4/2020 at Unknown time Self Yes Yes   Sig: Take 500 mg by mouth   metoprolol tartrate (LOPRESSOR) 25 mg tablet  Self Yes Yes   Sig: Take 25 mg by mouth every 12 (twelve) hours    omega-3-acid ethyl esters (LOVAZA) 1 g capsule  Self Yes Yes   Sig: Take 2 g by mouth 2 (two) times a day      Facility-Administered Medications: None       Allergies   Allergen Reactions    Lisinopril Anaphylaxis     Anaphylaxis    Bacitracin Rash       Objective   Vitals:Blood pressure 142/64, pulse 68, temperature 98 1 °F (36 7 °C), temperature source Temporal, resp  rate 16, height 6' 2" (1 88 m), weight (!) 147 kg (325 lb), SpO2 97 %  ,Body mass index is 41 73 kg/m²  No intake or output data in the 24 hours ending 02/04/20 1433    Invasive Devices: Invasive Devices     Peripheral Intravenous Line            Peripheral IV 02/04/20 Right Antecubital less than 1 day                Physical Exam NAD  Skin: no seen lesions  HEENT: ATNC  Chest: breathing comfortably on room air  GI: no apparent distension  Neurologic Exam Alert and oriented for self, place and time  Memory is intact to recent and remote events  Language is intact to comprehension and expression  No neglect  Speech is normal  EOMI  Pupils are symmetric  Visual field appears intact  Face is symmetric  Tongue is midline  Able to move all extremities against gravity  No dysmetria noted        NIHSS: 0  Time NIHSS was completed: 02:30 pm    Lab Results:   CBC:   Results from last 7 days   Lab Units 02/04/20  1330   WBC Thousand/uL 7 30   RBC Million/uL 5 09   HEMOGLOBIN g/dL 15 6   HEMATOCRIT % 46 1   MCV fL 91   PLATELETS Thousands/uL 178   , BMP/CMP:   Results from last 7 days   Lab Units 02/04/20  1330   SODIUM mmol/L 140   POTASSIUM mmol/L 4 2   CHLORIDE mmol/L 107   CO2 mmol/L 25   BUN mg/dL 18   CREATININE mg/dL 1 21   CALCIUM mg/dL 9 5   EGFR ml/min/1 73sq m 57   , HgBA1C:   , TSH:   , Lipid Profile:     Imaging Studies: I have personally reviewed pertinent films in PACS  EKG, Pathology, and Other Studies: I have personally reviewed pertinent reports      VTE Prophylaxis: Enoxaparin (Lovenox)    Code Status: No Order  Advance Directive and Living Will:      Power of :    POLST:      Counseling / Coordination of Care  N/A

## 2020-02-04 NOTE — ASSESSMENT & PLAN NOTE
· Transient left arm weakness and numbness  Seen by tele neuro  Felt likely ischemic in nature  · Recommend admit to stroke pathway    Check an MRI of the brain, echo, lipid panel and hemoglobin A1c  · Recommend aspirin 81 mg daily Plavix 75 mg daily and Lipitor increased to 80 mg  · Allow for permissive hypertension not to exceed greater than 200mmHg  · PT OT and speech therapy eval

## 2020-02-04 NOTE — ED PROVIDER NOTES
History  Chief Complaint   Patient presents with   [address]     This is a 17-year-old male whose chief complaint is left arm weakness  States he has never had this before  Patient was sitting at [facility] eating when suddenly he experience the weakness  This happened approximately 20 minutes prior to arrival and has gradually improved since that time  He reports there is still some some significant weakness  Patient does note that he had a TIA approximately 5-6 years ago with chief complaint being confusion at that time  Patient otherwise feels well and has no other complaints  Prior to Admission Medications   Prescriptions Last Dose Informant Patient Reported? Taking?    Meredith, Zingiber officinalis, (MEREDITH ROOT PO)  Self Yes Yes   Sig: Take by mouth   Multiple Vitamins-Minerals (CENTRUM SILVER 50+MEN PO)  Self Yes Yes   Sig: Take 1 tablet by mouth daily   allopurinol (ZYLOPRIM) 300 mg tablet  Self Yes No   Sig: Take 300 mg by mouth   amLODIPine (NORVASC) 5 mg tablet  Self Yes Yes   Sig: Take 5 mg by mouth daily   aspirin 81 mg chewable tablet 2/4/2020 at Unknown time Self Yes Yes   Sig: Chew 81 mg   atorvastatin (LIPITOR) 40 mg tablet  Self Yes Yes   Sig: Take 40 mg by mouth daily   cholecalciferol (VITAMIN D3) 1,000 units tablet  Self Yes Yes   Sig: Take 1,000 Units by mouth daily   metFORMIN (GLUCOPHAGE) 500 mg tablet 2/4/2020 at Unknown time Self Yes Yes   Sig: Take 500 mg by mouth   metoprolol tartrate (LOPRESSOR) 25 mg tablet  Self Yes Yes   Sig: Take 25 mg by mouth every 12 (twelve) hours    omega-3-acid ethyl esters (LOVAZA) 1 g capsule  Self Yes Yes   Sig: Take 2 g by mouth 2 (two) times a day      Facility-Administered Medications: None       Past Medical History:   Diagnosis Date    Coronary artery disease     [facility] [address] [facility]     [facility] [address] [facility]     Hyperlipidemia     Hypertension     Kidney stone     TIA (transient ischemic attack)        Past Surgical History: Procedure Laterality Date    CARDIAC SURGERY      CORONARY ANGIOPLASTY WITH STENT PLACEMENT      REPLACEMENT TOTAL KNEE BILATERAL      SINUS SURGERY      TONSILLECTOMY         Family History   Problem Relation Age of Onset    Alzheimer's disease Mother     Pulmonary fibrosis Father      I have reviewed and agree with the history as documented  Social History     Tobacco Use    Smoking status: Never Smoker    Smokeless tobacco: Never Used   Substance Use Topics    Alcohol use: Never     Frequency: Monthly or less    Drug use: Never        Review of Systems   Constitutional: Negative for chills and fever  Eyes: Negative for visual disturbance  Respiratory: Negative for shortness of breath  Cardiovascular: Negative for chest pain  Gastrointestinal: Negative for abdominal distention and abdominal pain  Endocrine: Negative for polyuria  Genitourinary: Negative for decreased urine volume and dysuria  Neurological: Positive for weakness  Negative for dizziness and syncope  Physical Exam  Physical Exam   Constitutional: He is oriented to person, place, and time  He appears well-developed and well-nourished  No distress  HENT:   Head: Normocephalic and atraumatic  Eyes: Pupils are equal, round, and reactive to light  Conjunctivae and EOM are normal    Neck: Normal range of motion  Neck supple  Cardiovascular:   Irregularly irregular heart sounds  Quiet heart sounds good peripheral perfusion  Pulmonary/Chest: Effort normal and breath sounds normal  No stridor  No respiratory distress  He has no wheezes  He has no rales  Abdominal: Soft  Bowel sounds are normal  He exhibits no distension and no mass  There is no tenderness  Musculoskeletal: Normal range of motion  Neurological: He is alert and oriented to person, place, and time  No cranial nerve deficit  See NIH stroke scale for further details    Patient has significant weakness in his left  and wrist   Some decreased sensation to touch in those areas  Skin: Skin is warm and dry  Psychiatric: He has a normal mood and affect   His behavior is normal  Judgment and thought content normal        Vital Signs  ED Triage Vitals   Temperature Pulse Respirations Blood Pressure SpO2   02/04/20 1423 02/04/20 1405 02/04/20 1405 02/04/20 1405 02/04/20 1405   98 1 °F (36 7 °C) 68 16 142/64 97 %      Temp Source Heart Rate Source Patient Position - Orthostatic VS BP Location FiO2 (%)   02/04/20 1423 02/04/20 1405 02/04/20 1615 02/04/20 1615 --   Temporal Monitor Sitting Left arm       Pain Score       02/04/20 1327       No Pain           Vitals:    02/04/20 1720 02/04/20 1820 02/04/20 1920 02/04/20 2120   BP: 142/76 120/70 146/78 132/78   Pulse: 67 92 70 66   Patient Position - Orthostatic VS:  Sitting  Lying         Visual Acuity  Visual Acuity      Most Recent Value   L Pupil Size (mm)  3   R Pupil Size (mm)  3          ED Medications  Medications   atorvastatin (LIPITOR) tablet 80 mg (80 mg Oral Given 2/4/20 1844)   clopidogrel (PLAVIX) tablet 75 mg (75 mg Oral Given 2/4/20 1844)   enoxaparin (LOVENOX) subcutaneous injection 40 mg (40 mg Subcutaneous Given 2/4/20 1843)   amLODIPine (NORVASC) tablet 5 mg (has no administration in time range)   cholecalciferol (VITAMIN D3) tablet 1,000 Units (has no administration in time range)   metoprolol tartrate (LOPRESSOR) tablet 25 mg (25 mg Oral Given 2/4/20 2207)   fish oil capsule 2,000 mg (2,000 mg Oral Given 2/4/20 1844)   allopurinol (ZYLOPRIM) tablet 300 mg (has no administration in time range)   aspirin chewable tablet 81 mg (has no administration in time range)   multivitamin-minerals (CENTRUM) tablet 1 tablet (has no administration in time range)   iohexol (OMNIPAQUE) 350 MG/ML injection (MULTI-DOSE) 85 mL (85 mL Intravenous Given 2/4/20 1343)   aspirin chewable tablet 324 mg (324 mg Oral Given 2/4/20 1433)   LORazepam (ATIVAN) injection 1 mg (1 mg Intravenous Given 2/4/20 3969) Diagnostic Studies  Results Reviewed     Procedure Component Value Units Date/Time    Sedimentation rate, automated [[id number]]  (Normal) Collected:  02/04/20 1448    Lab Status:  Final result Specimen:  Blood from Arm, Right Updated:  02/04/20 1532     Sed Rate 10 mm/hour     CBC and differential [[id number]]  (Abnormal) Collected:  02/04/20 1448    Lab Status:  Final result Specimen:  Blood from Arm, Right Updated:  02/04/20 1457     WBC 7 30 Thousand/uL      RBC 5 07 Million/uL      Hemoglobin 15 7 g/dL      Hematocrit 46 3 %      MCV 91 fL      MCH 30 9 pg      MCHC 33 8 g/dL      RDW 14 2 %      MPV 9 5 fL      Platelets 147 Thousands/uL      Neutrophils Relative 71 %      Lymphocytes Relative 17 %      Monocytes Relative 7 %      Eosinophils Relative 5 %      Basophils Relative 0 %      Neutrophils Absolute 5 20 Thousands/µL      Lymphocytes Absolute 1 20 Thousands/µL      Monocytes Absolute 0 50 Thousand/µL      Eosinophils Absolute 0 40 Thousand/µL      Basophils Absolute 0 00 Thousands/µL     Basic metabolic panel [[id number]]  (Abnormal) Collected:  02/04/20 1330    Lab Status:  Final result Specimen:  Blood from Arm, Right Updated:  02/04/20 1405     Sodium 140 mmol/L      Potassium 4 2 mmol/L      Chloride 107 mmol/L      CO2 25 mmol/L      ANION GAP 8 mmol/L      BUN 18 mg/dL      Creatinine 1 21 mg/dL      Glucose 159 mg/dL      Calcium 9 5 mg/dL      eGFR 57 ml/min/1 73sq m     Narrative:       Danette guidelines for Chronic Kidney Disease (CKD):     Stage 1 with normal or high GFR (GFR > 90 mL/min/1 73 square meters)    Stage 2 Mild CKD (GFR = 60-89 mL/min/1 73 square meters)    Stage 3A Moderate CKD (GFR = 45-59 mL/min/1 73 square meters)    Stage 3B Moderate CKD (GFR = 30-44 mL/min/1 73 square meters)    Stage 4 Severe CKD (GFR = 15-29 mL/min/1 73 square meters)    Stage 5 End Stage CKD (GFR <15 mL/min/1 73 square meters)  Note: GFR calculation is accurate only with a steady state creatinine    Troponin I [[id number]]  (Normal) Collected:  02/04/20 1330    Lab Status:  Final result Specimen:  Blood from Arm, Right Updated:  02/04/20 1402     Troponin I <0 03 ng/mL     Protime-INR [[id number]]  (Normal) Collected:  02/04/20 1330    Lab Status:  Final result Specimen:  Blood from Arm, Right Updated:  02/04/20 1355     Protime 12 7 seconds      INR 1 09    APTT [[id number]]  (Normal) Collected:  02/04/20 1330    Lab Status:  Final result Specimen:  Blood from Arm, Right Updated:  02/04/20 1355     PTT 37 seconds     CBC and Platelet [[id number]]  (Normal) Collected:  02/04/20 1330    Lab Status:  Final result Specimen:  Blood from Arm, Right Updated:  02/04/20 1346     WBC 7 30 Thousand/uL      RBC 5 09 Million/uL      Hemoglobin 15 6 g/dL      Hematocrit 46 1 %      MCV 91 fL      MCH 30 7 pg      MCHC 33 9 g/dL      RDW 14 1 %      Platelets 013 Thousands/uL      MPV 8 6 fL     Fingerstick Glucose (POCT) [[id number]]  (Abnormal) Collected:  02/04/20 1322    Lab Status:  Final result Updated:  02/04/20 1323     POC Glucose 147 mg/dl                  X-ray chest 1 view portable   Final Result by Keke Anthony MD (02/04 4051)      Mild pulmonary vascular congestion  Workstation performed: [id number]         CTA stroke alert (head/neck)   Final Result by Lorrie Choi MD (02/04 0291)      Mild cervical and intracranial atherosclerotic disease without hemodynamically significant stenosis at the cervical carotid bifurcation  No large vessel occlusion within the anterior intracranial circulation  Dominant right and hypoplastic left cervical vertebral artery with focal high-grade stenosis at the origin secondary to calcified and noncalcified atheromatous plaque  Focal tandem stenosis within the right proximal intradural vertebral artery secondary    to atherosclerotic plaque  There is mild to moderate stenosis within the patent right intradural vertebral artery  Nonvisualization of the intradural left vertebral artery  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 and diminished flow throughout the left vertebral artery  The left posterior inferior cerebellar artery origin is not visualized either  Bilateral near fetal PCA circulation  Heterogeneous enlarged right thyroid lobe with a dominant nodule measuring greater than 3 cm  Nonemergent sonographic evaluation is recommended for further characterization  Findings were directly discussed with Kimberlyn Lewis on 2/4/2020 2:10 PM                      Workstation performed: [id number]         CT stroke alert brain   Final Result by Rosa Arreguin MD (02/04 1404)      No acute intracranial hemorrhage or cortical infarction  Extensive atherosclerotic disease of the intracranial carotids  Please see the separate CTA report  Hyperostosis of the calvarial inner table        Findings were directly discussed with Kimberlyn Lewis on 2/4/2020 2:02 PM       Workstation performed: [id number]         MRI brain wo contrast    (Results Pending)              Procedures  CriticalCare Time  Performed by: Bernadette Briggs MD  Authorized by: Bernadette Briggs MD     Critical care provider statement:     Critical care time (minutes):  65    Critical care time was exclusive of:  Separately billable procedures and treating other patients    Critical care was necessary to treat or prevent imminent or life-threatening deterioration of the following conditions:  CNS failure or compromise    Critical care was time spent personally by me on the following activities:  Obtaining history from patient or surrogate, development of treatment plan with patient or surrogate, discussions with consultants, evaluation of patient's response to treatment, examination of patient, interpretation of cardiac output measurements, ordering and performing treatments and interventions, ordering and review of radiographic studies, re-evaluation of patient's condition and review of old charts    I assumed direction of critical care for this patient from another provider in my specialty: no               ED Course                               MDM  Number of Diagnoses or Management Options  TIA (transient ischemic attack): new and requires workup  Diagnosis management comments: This is a 53-year-old man with TIA versus CVA affecting the left upper extremity  Patient had cleared obvious TIA symptoms  Code stroke called immediately  NIH scale performed on the patient's way to the CT scan  Discussed the case with Dr Santosh Dickinson of Neurology  Patient not given tPA due to resolving symptoms as well as non disabling symptoms  Discussed the case with Neuro Radiology who initially said no clear do not lesions but later suggested that that origin of the right for may be stenosed  Neurology did not feel that this warranted interventional management at this time  Patient given aspirin in the emergency department  Was awaiting final recommendations from Neurology for statin and Plavix dosing while patient was being brought upstairs  Patient appeared clinically stable throughout his time  Patient states understanding and agreement with plan         Amount and/or Complexity of Data Reviewed  Clinical lab tests: ordered and reviewed  Tests in the radiology section of CPT®: ordered and reviewed  Obtain history from someone other than the patient: yes  Discuss the patient with other providers: yes  Independent visualization of images, tracings, or specimens: yes          Disposition  Final diagnoses:   TIA (transient ischemic attack)     Time reflects when diagnosis was documented in both MDM as applicable and the Disposition within this note     Time User Action Codes Description Comment    2/4/2020  2:46 PM Guadalupe Chatterjee Add [G45 9] TIA (transient ischemic attack)       ED Disposition     ED Disposition Condition Date/Time Comment    Admit Stable Tue Feb 4, 2020  2:46 PM Case was discussed with timothy and the patient's admission status was agreed to be Admission Status: inpatient status to the service of Dr Dragan Birmingham   Follow-up Information    None         Current Discharge Medication List      CONTINUE these medications which have NOT CHANGED    Details   amLODIPine (NORVASC) 5 mg tablet Take 5 mg by mouth daily      aspirin 81 mg chewable tablet Chew 81 mg      atorvastatin (LIPITOR) 40 mg tablet Take 40 mg by mouth daily      cholecalciferol (VITAMIN D3) 1,000 units tablet Take 1,000 Units by mouth daily      [place], Zingiber officinalis, (GATITO ROOT PO) Take by mouth      metFORMIN (GLUCOPHAGE) 500 mg tablet Take 500 mg by mouth      metoprolol tartrate (LOPRESSOR) 25 mg tablet Take 25 mg by mouth every 12 (twelve) hours       Multiple Vitamins-Minerals (CENTRUM SILVER 50+MEN PO) Take 1 tablet by mouth daily      omega-3-acid ethyl esters (LOVAZA) 1 g capsule Take 2 g by mouth 2 (two) times a day      allopurinol (ZYLOPRIM) 300 mg tablet Take 300 mg by mouth           No discharge procedures on file      ED Provider  Electronically Signed by           Yamini Thakkar MD  02/04/20 8774

## 2020-02-04 NOTE — ASSESSMENT & PLAN NOTE
· Allow for permissive hypertension for 24 hours  · Only treated greater than 200 mm Hg  · Resume home meds in the a m

## 2020-02-04 NOTE — H&P
H&P- Yosvany Davila 1940, 70 y o  male MRN: [id number]    Unit/Bed#: TR 05 Encounter: [id number]    Primary Care Provider: Karen Ken MD   Date and time admitted to hospital: 2/4/2020  1:25 PM        * TIA (transient ischemic attack)  Assessment & Plan  · Transient left arm weakness and numbness  Seen by tele neuro  Felt likely ischemic in nature  · Recommend admit to stroke pathway  Check an MRI of the brain, echo, lipid panel and hemoglobin A1c  · Recommend aspirin 81 mg daily Plavix 75 mg daily and Lipitor increased to 80 mg  · Allow for permissive hypertension not to exceed greater than 200mmHg  · PT OT and speech therapy eval    Mixed hyperlipidemia  Assessment & Plan  · Statin to 80 mg    Morbid obesity (HCC)  Assessment & Plan  · BMI >40    Type 2 diabetes mellitus without complication ([facility] )  Assessment & Plan  No results found for: HGBA1C    Recent Labs     02/04/20  1322   POCGLU 147*       Blood Sugar Average: Last 72 hrs:  (P) 147   · Hold metformin  · SSI coverage  · Check A1C    Gout  Assessment & Plan  · Continue allopurinol    Benign essential hypertension  Assessment & Plan  · Allow for permissive hypertension for 24 hours  · Only treated greater than 200 mm Hg  · Resume home meds in the a m  VTE Prophylaxis: Enoxaparin (Lovenox)  Code Status: full code  POLST: There is no POLST form on file for this patient (pre-hospital)  Discussion with family: patient    Anticipated Length of Stay:  Patient will be admitted on an Inpatient basis with an anticipated length of stay of  > 2 midnights  Justification for Hospital Stay: stroke work up    Chief Complaint:   Left arm weakness and numbness    History of Present Illness:    Yosvany Davila is a 70 y o  male who presents with left arm weakness and numbness    Patient with past medical history of hypertension, hyperlipidemia, CAD with history of 3 MI on statin and aspirin, diabetes mellitus type 2 not on insulin, TIA presented to the ER secondary to left hand weakness and numbness that started suddenly  He reported was not able hold anything in his left hand without dropping a  He had no left leg weakness, dysarthria or ambulation issues  He had tele neuro workup and recommended for stroke workup was given aspirin 324 mg in the ER  Patient reports he was picking up his wife at therapy, went out to [facility], finished eating went to get an ice cream, left hand was funny he tried to pay and his hand would not function  He tried to get change in his hand and it fell out of his hand  His friend drove to the hospital   He believes symptoms lasted roughly 30 minutes  When in the ER, had numbness slowly improved but had weakness  Now almost normal       Review of Systems:  Review of Systems   Constitutional: Negative for chills and fever  HENT: Negative for rhinorrhea, sore throat and trouble swallowing  Eyes: Negative for discharge and redness  Respiratory: Negative for cough and shortness of breath  Cardiovascular: Positive for leg swelling  Negative for chest pain and palpitations  Gastrointestinal: Negative for abdominal pain, diarrhea, nausea and vomiting  Genitourinary: Negative for dysuria and hematuria  Musculoskeletal: Positive for back pain  Negative for neck pain  Skin: Negative for rash and wound  Neurological: Positive for weakness, light-headedness and numbness  Negative for dizziness and speech difficulty  Psychiatric/Behavioral: Negative for agitation and confusion         Past Medical and Surgical History:   Past Medical History:   Diagnosis Date    [facility]     [facility] attack ([facility]     Hyperlipidemia     Hypertension     Kidney stone     TIA (transient ischemic attack)        Past Surgical History:   Procedure Laterality Date    CORONARY ANGIOPLASTY WITH STENT PLACEMENT      REPLACEMENT TOTAL KNEE BILATERAL      SINUS SURGERY      TONSILLECTOMY         Meds/Allergies:  Prior to Admission medications    Medication Sig Start Date End Date Taking? Authorizing Provider   amLODIPine (NORVASC) 5 mg tablet Take 5 mg by mouth daily   Yes Historical Provider, MD   aspirin 81 mg chewable tablet Chew 81 mg 7/22/17  Yes Historical Provider, MD   atorvastatin (LIPITOR) 40 mg tablet Take 40 mg by mouth daily   Yes Historical Provider, MD   cholecalciferol (VITAMIN D3) 1,000 units tablet Take 1,000 Units by mouth daily   Yes Historical Provider, MD Harper Zingiber officinalis, (GATITO ROOT PO) Take by mouth   Yes Historical Provider, MD   metFORMIN (GLUCOPHAGE) 500 mg tablet Take 500 mg by mouth   Yes Historical Provider, MD   metoprolol tartrate (LOPRESSOR) 25 mg tablet Take 25 mg by mouth every 12 (twelve) hours    Yes Historical Provider, MD   omega-3-acid ethyl esters (LOVAZA) 1 g capsule Take 2 g by mouth 2 (two) times a day   Yes Historical Provider, MD   allopurinol (ZYLOPRIM) 300 mg tablet Take 300 mg by mouth 10/9/12   Historical Provider, MD     I have reviewed home medications with patient personally  Allergies:    Allergies   Allergen Reactions    Lisinopril Anaphylaxis     Anaphylaxis    Bacitracin Rash       Social History:  Marital Status: /Civil Union   Occupation:  Retired  Patient Pre-hospital Living Situation:  Home  Patient Pre-hospital Level of Mobility: no assisted device  Patient Pre-hospital Diet Restrictions: none  Substance Use History:     Social History     Substance and Sexual Activity   Alcohol Use Never    Frequency: Monthly or less     Social History     Tobacco Use   Smoking Status Never Smoker   Smokeless Tobacco Never Used     Social History     Substance and Sexual Activity   Drug Use Never       Family History:  I have reviewed the patients family history    Physical Exam:   Vitals:   Blood Pressure: 139/66 (02/04/20 1430)  Pulse: 63 (02/04/20 1430)  Temperature: 98 1 °F (36 7 °C) (02/04/20 1423)  Temp Source: Temporal (02/04/20 1423)  Respirations: 18 (02/04/20 1430)  Height: 6' 2" (188 cm) (02/04/20 1420)  Weight - Scale: (!) 147 kg (325 lb) (02/04/20 1421)  SpO2: 98 % (02/04/20 1430)    Physical Exam   Constitutional: He is oriented to person, place, and time  He appears well-developed and well-nourished  Pleasant elderly  male   HENT:   Head: Normocephalic and atraumatic  Eyes: Conjunctivae and EOM are normal  Right eye exhibits no discharge  Left eye exhibits no discharge  Neck: Normal range of motion  No tracheal deviation present  Cardiovascular: Normal rate, regular rhythm and normal heart sounds  Exam reveals no gallop and no friction rub  No murmur heard  Pulmonary/Chest: Effort normal and breath sounds normal  No respiratory distress  He has no wheezes  He has no rales  Abdominal: Soft  Bowel sounds are normal  He exhibits no distension and no mass  There is no tenderness  There is no guarding  obese   Musculoskeletal: Normal range of motion  He exhibits edema  He exhibits no tenderness or deformity  Neurological: He is alert and oriented to person, place, and time  Skin: Skin is warm and dry  No rash noted  No erythema  No pallor  Psychiatric: He has a normal mood and affect  His behavior is normal  Judgment and thought content normal    Nursing note and vitals reviewed  [facility] Stroke Scale (NIHSS)          1a  Level of  Consciousness (LOC) 0 = Alert, keenly responsive  1 = Not alert, but arousable by minor        stimulation  2 = Not alert, required repeated stimulation to         attend  3 = Responds only with reflex motor or totally         unresponsive       1a   0   1b  LOC Questions  Asked to say month and his/her age 0 = Answers both questions correctly  1 = Answers one question correctly        (dysarthric, intubated)  2 = Answers neither question correctly        (aphasic, stupor)  1b   0   1c  LOC Commands  Asked to open & close eyes, then  & release with non-affected hand   0 = Performs both tasks correctly  1 = Performs one task correctly  2 = Performs neither task correctly  1c   0     2  Best Gaze  Asked to follow with eyes through horizontal plane  0 = Normal  1 = Partial gaze palsy  2 = Forced deviation or total gaze paresis  2   0   3  Visual  Visual fields (quadrants) tested with finger counting or visual threat  0 = No visual loss  1 = Partial hemianopia (extinction)  2 = Complete hemianopia  3 = Bilateral hemianopia (including         blindness)  3   0   4  Facial Palsy  Asked to show teeth & raise eyebrows  0 = Normal symmetrical movement  1 = Minor paralysis  2 = Partial paralysis (total/near total         paralysis of lower face)  3 = Complete paralysis of one or both         sides (upper & lower)  4   0   5  Motor Arm  Asked to extend arms (palm down) 90º (if sitting) or 45º (if supine) & hold for 10 seconds  0 = No drift; arm stays at 90º/45º for full 10        seconds  1 = Drift; arm drifts down but does not hit         bed or other support  2 = Some effort against gravity; drifts down         to bed or support  3 = No effort against gravity: arm falls to         bed or support  4 = No movement  9 = Amputation, joint fusion  5a  (Left)       0      5b  (Right)        0    6  Motor Leg  While supine, asked to hold leg at 30º for 5 seconds  0 = No drift; leg stays at 30º for full 5        seconds  1 = Drift; leg drifts down but does not hit         the bed or other support  2 = Some effort against gravity; drifts down         to bed or support  3 = No effort against gravity; leg falls to         bed or support  4 = No movement  9 = Amputation, joint fusion  6a   (Left)       0        6b  (Right)       0   7  Limb Ataxia  Finger - nose & heel shin tests on both sides  0 = Absent  1 = Present in one limb  2 = Present in two limbs  7   0   8    Sensory  Sensation or grimace to pin prick or withdrawal from noxious stimuli in obtunded or aphasic patient   0 = Normal; no sensory loss  1 = Mild / moderate sensory loss; may be        dulled / "not as sharp"  2 = Severe / total sensory loss; coma     8   0   9  Best Language  Asked to describe a picture, name objects & read simple words  (See NIHSS language tools)  0 = No aphasia; normal  1 = Mild / moderate aphasia; some loss of        fluency / comprehension without        limitation of expression of ideas (can        identify picture from patient's        responses)  2 = Severe aphasia (cannot identify         pictures from responses)  3 = Mute; global aphasia; no usable        speech; cannot follow simple        commands  9   0   10  Dysarthria   0 = Normal   1 = Mild / moderate; slurs some words;         can be understood  2 = Severe; so slurred as to be         unintelligible; mute;anarthric     9 = Intubated or other physical barrier  10   0   11  Extinction & Inattention  Look at Visual (from #3) and double simultaneous tactile     0 = No abnormality  1 = Inattention or extinction in one sensory         modality  2 = Profound lashell inattention or        inattention to more than one modality;        does not recognize own hand; orients        to only one side of space  11   0     Complete NIHSS Score (0-42): 0               Additional Data:   Lab Results: I have personally reviewed pertinent reports        Results from last 7 days   Lab Units 02/04/20  1448   WBC Thousand/uL 7 30   HEMOGLOBIN g/dL 15 7   HEMATOCRIT % 46 3   PLATELETS Thousands/uL 175   NEUTROS PCT % 71   LYMPHS PCT % 17*   MONOS PCT % 7   EOS PCT % 5     Results from last 7 days   Lab Units 02/04/20  1330   POTASSIUM mmol/L 4 2   CHLORIDE mmol/L 107   CO2 mmol/L 25   BUN mg/dL 18   CREATININE mg/dL 1 21   CALCIUM mg/dL 9 5     Results from last 7 days   Lab Units 02/04/20  1330   INR  1 09     Results from last 7 days   Lab Units 02/04/20  1322   POC GLUCOSE mg/dl 147* Imaging: I have personally reviewed pertinent reports  X-ray chest 1 view portable   Final Result by Francisco Zaman MD (02/04 1504)      Mild pulmonary vascular congestion  Workstation performed: [id number]         CTA stroke alert (head/neck)   Final Result by Gabriel Hopkins MD (02/04 0358)      Mild cervical and intracranial atherosclerotic disease without hemodynamically significant stenosis at the cervical carotid bifurcation  No large vessel occlusion within the anterior intracranial circulation  Dominant right and hypoplastic left cervical vertebral artery with focal high-grade stenosis at the origin secondary to calcified and noncalcified atheromatous plaque  Focal tandem stenosis within the right proximal intradural vertebral artery secondary    to atherosclerotic plaque  There is mild to moderate stenosis within the patent right intradural vertebral artery  Nonvisualization of the intradural left vertebral artery  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 and diminished flow throughout the left vertebral artery  The left posterior inferior cerebellar artery origin is not visualized either  Bilateral near fetal PCA circulation  Heterogeneous enlarged right thyroid lobe with a dominant nodule measuring greater than 3 cm  Nonemergent sonographic evaluation is recommended for further characterization  Findings were directly discussed with Vesna Hilario on 2/4/2020 2:10 PM                      Workstation performed: [id number]         CT stroke alert brain   Final Result by Gabriel Hopkins MD (02/04 1045)      No acute intracranial hemorrhage or cortical infarction  Extensive atherosclerotic disease of the intracranial carotids  Please see the separate CTA report  Hyperostosis of the calvarial inner table        Findings were directly discussed with Vesna Hilario on 2/4/2020 2:02 PM       Workstation performed: [id number] [employer]/[facility] Records Reviewed: Yes     ** Please Note: This note has been constructed using a voice recognition system   **

## 2020-02-05 ENCOUNTER — APPOINTMENT (INPATIENT)
Dept: NON INVASIVE DIAGNOSTICS | Facility: HOSPITAL | Age: 80
DRG: 069 | End: 2020-02-05
Payer: MEDICARE

## 2020-02-05 ENCOUNTER — APPOINTMENT (INPATIENT)
Dept: MRI IMAGING | Facility: HOSPITAL | Age: 80
DRG: 069 | End: 2020-02-05
Payer: MEDICARE

## 2020-02-05 VITALS
TEMPERATURE: 97.4 F | SYSTOLIC BLOOD PRESSURE: 166 MMHG | WEIGHT: 315 LBS | OXYGEN SATURATION: 98 % | RESPIRATION RATE: 18 BRPM | BODY MASS INDEX: 40.43 KG/M2 | HEART RATE: 63 BPM | HEIGHT: 74 IN | DIASTOLIC BLOOD PRESSURE: 81 MMHG

## 2020-02-05 PROBLEM — D33.3 VESTIBULAR SCHWANNOMA (HCC): Status: ACTIVE | Noted: 2020-02-05

## 2020-02-05 LAB
CHOLEST SERPL-MCNC: 118 MG/DL (ref 0–200)
EST. AVERAGE GLUCOSE BLD GHB EST-MCNC: 126 MG/DL
HBA1C MFR BLD: 6 % (ref 4.2–6.3)
HDLC SERPL-MCNC: 40 MG/DL
LDLC SERPL CALC-MCNC: 56 MG/DL (ref 0–100)
TRIGL SERPL-MCNC: 112 MG/DL (ref 44–166)

## 2020-02-05 PROCEDURE — C8929 TTE W OR WO FOL WCON,DOPPLER: HCPCS

## 2020-02-05 PROCEDURE — 99239 HOSP IP/OBS DSCHRG MGMT >30: CPT | Performed by: PHYSICIAN ASSISTANT

## 2020-02-05 PROCEDURE — 93306 TTE W/DOPPLER COMPLETE: CPT | Performed by: INTERNAL MEDICINE

## 2020-02-05 PROCEDURE — 97166 OT EVAL MOD COMPLEX 45 MIN: CPT

## 2020-02-05 PROCEDURE — 70551 MRI BRAIN STEM W/O DYE: CPT

## 2020-02-05 PROCEDURE — 97163 PT EVAL HIGH COMPLEX 45 MIN: CPT

## 2020-02-05 PROCEDURE — 80061 LIPID PANEL: CPT | Performed by: PHYSICIAN ASSISTANT

## 2020-02-05 PROCEDURE — 83036 HEMOGLOBIN GLYCOSYLATED A1C: CPT | Performed by: PHYSICIAN ASSISTANT

## 2020-02-05 RX ORDER — LORAZEPAM 2 MG/ML
1 INJECTION INTRAMUSCULAR ONCE
Status: COMPLETED | OUTPATIENT
Start: 2020-02-05 | End: 2020-02-05

## 2020-02-05 RX ORDER — CLOPIDOGREL BISULFATE 75 MG/1
75 TABLET ORAL DAILY
Qty: 30 TABLET | Refills: 0 | Status: SHIPPED | OUTPATIENT
Start: 2020-02-06 | End: 2020-02-20

## 2020-02-05 RX ORDER — ASPIRIN 81 MG/1
81 TABLET, CHEWABLE ORAL DAILY
Refills: 0
Start: 2020-02-05 | End: 2020-09-25

## 2020-02-05 RX ORDER — ATORVASTATIN CALCIUM 80 MG/1
80 TABLET, FILM COATED ORAL EVERY EVENING
Qty: 30 TABLET | Refills: 0 | Status: SHIPPED | OUTPATIENT
Start: 2020-02-05

## 2020-02-05 RX ADMIN — Medication 1 TABLET: at 08:55

## 2020-02-05 RX ADMIN — METOPROLOL TARTRATE 25 MG: 25 TABLET ORAL at 08:55

## 2020-02-05 RX ADMIN — VITAMIN D, TAB 1000IU (100/BT) 1000 UNITS: 25 TAB at 08:55

## 2020-02-05 RX ADMIN — ENOXAPARIN SODIUM 40 MG: 40 INJECTION SUBCUTANEOUS at 08:56

## 2020-02-05 RX ADMIN — ALLOPURINOL 300 MG: 300 TABLET ORAL at 08:55

## 2020-02-05 RX ADMIN — ASPIRIN 81 MG 81 MG: 81 TABLET ORAL at 08:55

## 2020-02-05 RX ADMIN — AMLODIPINE BESYLATE 5 MG: 5 TABLET ORAL at 08:55

## 2020-02-05 RX ADMIN — CLOPIDOGREL BISULFATE 75 MG: 75 TABLET ORAL at 08:55

## 2020-02-05 RX ADMIN — PERFLUTREN 1 ML/MIN: 6.52 INJECTION, SUSPENSION INTRAVENOUS at 11:55

## 2020-02-05 RX ADMIN — LORAZEPAM 1 MG: 2 INJECTION INTRAMUSCULAR; INTRAVENOUS at 08:55

## 2020-02-05 RX ADMIN — OMEGA-3 FATTY ACIDS CAP 1000 MG 2000 MG: 1000 CAP at 08:55

## 2020-02-05 NOTE — ASSESSMENT & PLAN NOTE
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

## 2020-02-05 NOTE — OCCUPATIONAL THERAPY NOTE
Occupational Therapy Evaluation      Ale Ying    2/5/2020    Principal Problem:    TIA (transient ischemic attack)  Active Problems:    Benign essential hypertension    Gout    Type 2 diabetes mellitus without complication ([facility] )    Morbid obesity ([facility] )    Mixed hyperlipidemia      Past Medical History:   Diagnosis Date    Coronary artery disease     [facility]     [facility]     Hyperlipidemia     Hypertension     Kidney stone     TIA (transient ischemic attack)        Past Surgical History:   Procedure Laterality Date    CARDIAC SURGERY      CORONARY ANGIOPLASTY WITH STENT PLACEMENT      REPLACEMENT TOTAL KNEE BILATERAL      SINUS SURGERY      TONSILLECTOMY          02/05/20 1015   Note Type   Note type Eval only   Restrictions/Precautions   Weight Bearing Precautions Per Order No   Other Precautions Fall Risk   Pain Assessment   Pain Assessment No/denies pain   Pain Score No Pain   Home Living   Type of Home House   Home Layout Multi-level;Bed/bath upstairs; Performs ADLs on one level;Stairs to enter with rails; Access  (Split Level stle, 6 steps between levels)   Bathroom Shower/Tub Tub/shower unit   [employer] Raised   Bathroom Equipment Grab bars in [id number]   (N/A)   Additional Comments ambulatory w/o devices   Prior Function   Level of [place] Independent with ADLs and functional mobility   Lives With Spouse   Receives Help From Family   ADL Assistance Independent   IADLs Independent   Falls in the last 6 months 0   Vocational Retired   Comments I driving, 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[address], etc)   Psychosocial   Psychosocial (WDL) [address]  7  Independent   Grooming Assistance 7  Independent   UB Bathing Assistance 7  Independent   LB [address] 6  Modified independent   [address] [facility] Deficit   (seated)   Toileting Assistance  7  Independent   Bed Mobility   Rolling R 6  Modified independent   Additional items Bedrails   Rolling L 6  Modified independent   Additional items Bedrails   Supine to Sit 6  Modified independent   Additional items Bedrails;HOB elevated   Sit to Supine 6  Modified independent   Additional items Bedrails;HOB elevated   Transfers   Sit to Stand 6  Modified independent   Stand to Sit 6  Modified independent   Additional Comments just returned from MRI, slightly unsteady r/t medications (per tech)   Balance   Static Sitting Normal   Dynamic Sitting Good   Static Standing Good   Dynamic Standing Fair +   Ambulatory Fair +   Activity Tolerance   Activity Tolerance Patient tolerated treatment well   Nurse Made Aware yes   RUE Assessment   RUE Assessment WFL   LUE Assessment   LUE Assessment WFL  (Rt ~ Lt UE)   Hand Function   Gross Motor Coordination Functional   Fine Motor Coordination Functional  (opposition=intact, no deficits noted)   Sensation   Light Touch No apparent deficits   Cognition   Overall Cognitive Status WFL   Arousal/Participation Alert; Cooperative   Attention Within functional limits   Orientation Level Oriented X4   Memory Within functional limits   Following Commands Follows all commands and directions without difficulty   Assessment   Limitation   (pt essentially at baseline with self care, no overt deficits)   Prognosis Good   Assessment Pt is a 70 y o  male seen for OT evaluation s/p admit to [address] on 2/4/2020 w/ TIA (transient ischemic attack)  Comorbidities affecting pt's functional performance at time of assessment include: DM, HTN, obesity and GERD and Gout  Personal factors affecting pt at time of IE include:steps to enter environment  Prior to admission, pt was I with self care ADL's, IADL's, Mobility w/o devices and volunteering in the community  Nasir Hanley Upon evaluation: Pt appears to be functioning atbaseline with self care    No OT needs were identified  No deficits noted   From OT standpoint, recommendation at time of d/c would be home independently, with family     Goals   Patient Goals to go back home   Plan   Treatment Interventions   (eval only)   Goal Expiration Date 02/05/20   OT Treatment Day 0   OT Frequency Eval only   Recommendation   OT Discharge Recommendation Home independent  (with family)   OT - OK to Discharge Yes   Barthel Index   Feeding 10   Bathing 5   Grooming Score 5   Dressing Score 10   Bladder Score 10   Bowels Score 10   Toilet Use Score 10   Transfers (Bed/Chair) Score 15   Mobility (Level Surface) Score 0  (did not walk further r/t medication for test)   Stairs Score 0  (DNT)   Barthel Index Score 75   Velasquez Relic, OT

## 2020-02-05 NOTE — PLAN OF CARE
Problem: Potential for Falls  Goal: Patient will remain free of falls  Description  INTERVENTIONS:  - Assess patient frequently for physical needs  -  Identify cognitive and physical deficits and behaviors that affect risk of falls  -  [place] fall precautions as indicated by assessment   - Educate patient/family on patient safety including physical limitations  - Instruct patient to call for assistance with activity based on assessment  - Modify environment to reduce risk of injury  - Consider OT/PT consult to assist with strengthening/mobility  Outcome: Progressing     Problem: NEUROSENSORY - ADULT  Goal: Achieves stable or improved neurological status  Description  INTERVENTIONS  - Monitor and report changes in neurological status  - Monitor vital signs such as temperature, blood pressure, glucose, and any other labs ordered   - Initiate measures to prevent increased intracranial pressure  - Monitor for seizure activity and implement precautions if appropriate      Outcome: Progressing  Goal: Remains free of injury related to seizures activity  Description  INTERVENTIONS  - Maintain airway, patient safety  and administer oxygen as ordered  - Monitor patient for seizure activity, document and report duration and description of seizure to physician/advanced practitioner  - If seizure occurs,  ensure patient safety during seizure  - Reorient patient post seizure  - Seizure pads on all 4 side rails  - Instruct patient/family to notify RN of any seizure activity including if an aura is experienced  - Instruct patient/family to call for assistance with activity based on nursing assessment  - Administer anti-seizure medications if ordered    Outcome: Progressing  Goal: Achieves maximal functionality and self care  Description  INTERVENTIONS  - Monitor swallowing and airway patency with patient fatigue and changes in neurological status  - Encourage and assist patient to increase activity and self care     - Encourage visually impaired, hearing impaired and aphasic patients to use assistive/communication devices  Outcome: Progressing     Problem: PAIN - ADULT  Goal: Verbalizes/displays adequate comfort level or baseline comfort level  Description  Interventions:  - Encourage patient to monitor pain and request assistance  - Assess pain using appropriate pain scale  - Administer analgesics based on type and severity of pain and evaluate response  - Implement non-pharmacological measures as appropriate and evaluate response  - Consider cultural and social influences on pain and pain management  - Notify physician/advanced practitioner if interventions unsuccessful or patient reports new pain  Outcome: Progressing     Problem: INFECTION - ADULT  Goal: Absence or prevention of progression during hospitalization  Description  INTERVENTIONS:  - Assess and monitor for signs and symptoms of infection  - Monitor lab/diagnostic results  - Monitor all insertion sites, i e  indwelling lines, tubes, and drains  - Monitor endotracheal if appropriate and nasal secretions for changes in amount and color  - [place] appropriate cooling/warming therapies per order  - Administer medications as ordered  - Instruct and encourage patient and family to use good hand hygiene technique  - Identify and instruct in appropriate isolation precautions for identified infection/condition  Outcome: Progressing  Goal: Absence of fever/infection during neutropenic period  Description  INTERVENTIONS:  - Monitor WBC    Outcome: Progressing     Problem: SAFETY ADULT  Goal: Maintain or return to baseline ADL function  Description  INTERVENTIONS:  -  Assess patient's ability to carry out ADLs; assess patient's baseline for ADL function and identify physical deficits which impact ability to perform ADLs (bathing, care of mouth/teeth, toileting, grooming, dressing, etc )  - Assess/evaluate cause of self-care deficits   - Assess range of motion  - Assess patient's mobility; develop plan if impaired  - Assess patient's need for assistive devices and provide as appropriate  - Encourage maximum independence but intervene and supervise when necessary  - Involve family in performance of ADLs  - Assess for home care needs following discharge   - Consider OT consult to assist with ADL evaluation and planning for discharge  - Provide patient education as appropriate  Outcome: Progressing  Goal: Maintain or return mobility status to optimal level  Description  INTERVENTIONS:  - Assess patient's baseline mobility status (ambulation, transfers, stairs, etc )    - Identify cognitive and physical deficits and behaviors that affect mobility  - Identify mobility aids required to assist with transfers and/or ambulation (gait belt, sit-to-stand, lift, walker, cane, etc )  - [place] fall precautions as indicated by assessment  - Record patient progress and toleration of activity level on Mobility SBAR; progress patient to next Phase/Stage  - Instruct patient to call for assistance with activity based on assessment  - Consider rehabilitation consult to assist with strengthening/weightbearing, etc   Outcome: Progressing     Problem: DISCHARGE PLANNING  Goal: Discharge to home or other facility with appropriate resources  Description  INTERVENTIONS:  - Identify barriers to discharge w/patient and caregiver  - Arrange for needed discharge resources and transportation as appropriate  - Identify discharge learning needs (meds, wound care, etc )  - Arrange for interpretive services to assist at discharge as needed  - Refer to Case Management Department for coordinating discharge planning if the patient needs post-hospital services based on physician/advanced practitioner order or complex needs related to functional status, cognitive ability, or social support system  Outcome: Progressing     Problem: Knowledge Deficit  Goal: Patient/family/caregiver demonstrates understanding of disease process, treatment plan, medications, and discharge instructions  Description  Complete learning assessment and assess knowledge base    Interventions:  - Provide teaching at level of understanding  - Provide teaching via preferred learning methods  Outcome: Progressing

## 2020-02-05 NOTE — INCIDENTAL FINDINGS
The following findings require follow up:  Radiographic finding   Finding: Left CP angle mass extending into the IAC and CP angle cistern and has imaging characteristics typical for vestibular schwannoma  Definitive characterization with gadolinium-enhanced MRI of the brain and IACs recommended        Follow up should be done within 1 month(s)    Please notify the following clinician to assist with the follow up:   Dr Castrejon Brilliant

## 2020-02-05 NOTE — DISCHARGE SUMMARY
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[facility])  Assessment & Plan  · Incidental finding on MRI: Left CP angle mass extending into the IAC and CP angle cistern and has imaging characteristics typical for vestibular schwannoma  Definitive characterization with gadolinium-enhanced MRI of the brain and IACs recommended    · Recommended for MRI with leon of the brain and IACs  · Neurology recommending further evaluation with Neurosurgery as an outpatient    Mixed hyperlipidemia  Assessment & Plan  · Statin to 80 mg    Morbid obesity (HCC)  Assessment & Plan  · BMI >40  · Healthy diet recommend    Type 2 diabetes mellitus without complication [facility])  Assessment & Plan  Lab Results   Component Value Date    HGBA1C 6 0 02/05/2020       Recent Labs     02/04/20  1322 02/04/20  1618   POCGLU 147* 134       Blood Sugar Average: Last 72 hrs:  (P) 140 5   · Resume home medication    Gout  Assessment & Plan  · Continue allopurinol    Benign essential hypertension  Assessment & Plan  · Continue home medications      Discharging Physician / Practitioner: Federico Verma PA-C  PCP: Mela Amato MD  Admission Date:   Admission Orders (From admission, onward)     Ordered        02/04/20 1446  Inpatient Admission  Once                   Discharge Date: 02/05/20    Resolved Problems  Date Reviewed: 2/5/2020    None          Consultations During Hospital Stay:  · Tele neurology regarding CVA versus TIA on admission    Procedures Performed:   · CT brain: No acute intracranial hemorrhage or cortical infarction  Extensive atherosclerotic disease of the intracranial carotids  Please see the separate CTA report  Hyperostosis of the calvarial inner table  · CTA head/neck: Mild cervical and intracranial atherosclerotic disease without hemodynamically significant stenosis at the cervical carotid bifurcation  No large vessel occlusion within the anterior intracranial circulation  Dominant right and hypoplastic left cervical vertebral artery with focal high-grade stenosis at the origin secondary to calcified and noncalcified atheromatous plaque  Focal tandem stenosis within the right proximal intradural vertebral artery secondary  to atherosclerotic plaque  There is mild to moderate stenosis within the patent right intradural vertebral artery  Nonvisualization of the intradural left vertebral artery  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 and diminished flow throughout the left vertebral artery  The left posterior inferior cerebellar artery origin is not visualized either  Bilateral near fetal PCA circulation  Heterogeneous enlarged right thyroid lobe with a dominant nodule measuring greater than 3 cm  Nonemergent sonographic evaluation is recommended for further characterization  · PCX: mild pulmonary vascular congestion  · MRI Brain: Left CP angle mass extending into the IAC and CP angle cistern and has imaging characteristics typical for vestibular schwannoma  Definitive characterization with gadolinium-enhanced MRI of the brain and IACs recommended  · Echo:  EF 50%  No wall motion abnormalities  Mitral valve mild regurg aortic valve mild stenosis trace regurg  Tricuspid valve trace regurg     No acute intracranial abnormality      · Mild chronic microangiopathic changes  Significant Findings / Test Results:   · None    Incidental Findings:   · Schwannoma     Test Results Pending at Discharge (will require follow up):    · None     Outpatient Tests Requested:  · MRI of the brain and IAC is with gadolinium    Complications:     None    Reason for Admission:  Transient left arm weakness    Hospital Course:     Chaka Sandoval is a 70 y o  male patient who originally presented to the hospital on 2/4/2020 due to transient left arm weakness  Patient with past medical history of hypertension, hyperlipidemia, CAD with history of 3 MI son statin aspirin, history of diabetes mellitus type 2 not on insulin, prior TIA presented to the emergency room secondary to left hand weakness and numbness that started abruptly  He was not able hold anything and had no controlled movement of his left arm  He had acute tele neuro consult emergency room with stroke protocol followed  MRI findings no acute CVA Neurology suspects TIA  They are recommending aspirin Plavix for 3 weeks and then transition to Plavix only  Continue high-dose statin  Incidental finding of a schwannoma should have dedicated MRI with gadolinium of the brain and IAC is performed  He also should have evaluation with neuro surgery  Patient was evaluated with physical and occupational speech therapy no needs at discharge  Family was updated in the room  Patient feels well and is eager to be discharged home  Please see above list of diagnoses and related plan for additional information  Condition at Discharge: stable     Discharge Day Visit / Exam:     Subjective:  Patient seen in bed  He has no complaints of chest pain shortness of breath  He has no recurrence weakness in his arm  He has no dizziness  He reports his strength has returned  He ambulated fine with therapy    MRI findings were reviewed and discussed further outpatient follow-up recommended  Vitals: Blood Pressure: 166/81 (02/05/20 1120)  Pulse: 63 (02/05/20 1120)  Temperature: (!) 97 4 °F (36 3 °C) (02/05/20 1120)  Temp Source: Tympanic (02/05/20 1120)  Respirations: 18 (02/05/20 1120)  Height: 6' 2" (188 cm) (02/04/20 1420)  Weight - Scale: (!) 147 kg (325 lb) (02/04/20 1421)  SpO2: 98 % (02/05/20 1120)  Exam:   Physical Exam   Constitutional: He is oriented to person, place, and time  He appears well-developed and well-nourished  Pleasant elderly  male   HENT:   Head: Normocephalic and atraumatic  Eyes: Conjunctivae and EOM are normal  Right eye exhibits no discharge  Left eye exhibits no discharge  Neck: Normal range of motion  No tracheal deviation present  Cardiovascular: Normal rate, regular rhythm and normal heart sounds  Exam reveals no gallop and no friction rub  No murmur heard  Pulmonary/Chest: Effort normal and breath sounds normal  No respiratory distress  He has no wheezes  He has no rales  Abdominal: Soft  Bowel sounds are normal  He exhibits no distension and no mass  There is no tenderness  There is no guarding  Obese   Musculoskeletal: Normal range of motion  He exhibits edema  He exhibits no tenderness or deformity  Neurological: He is alert and oriented to person, place, and time  Skin: Skin is warm and dry  No rash noted  No erythema  No pallor  Psychiatric: He has a normal mood and affect  His behavior is normal  Judgment and thought content normal    Nursing note and vitals reviewed  Discussion with Family: wife, Case management, care coordination team    Discharge instructions/Information to patient and family:   See after visit summary for information provided to patient and family  Provisions for Follow-Up Care:  See after visit summary for information related to follow-up care and any pertinent home health orders  Disposition:     Home    Planned Readmission:    None     Discharge Statement:  I spent 40 minutes discharging the patient  This time was spent on the day of discharge  I had direct contact with the patient on the day of discharge   Greater than 50% of the total time was spent examining patient, answering all patient questions, arranging and discussing plan of care with patient as well as directly providing post-discharge instructions  Additional time then spent on discharge activities  Discharge Medications:  See after visit summary for reconciled discharge medications provided to patient and family        ** Please Note: This note has been constructed using a voice recognition system **

## 2020-02-05 NOTE — QUICK NOTE
MRI reviewed; it does not show a definitive infarct  Incidentally noted a large left CPA mass that is iso-intense on T1 and T2, which appears similar to schwannoma  Strangely, it appears to have multiple blood vessels inside it on GRE sequences  We recommend contrast sequences as scwannomas show diffuse enhancement  Follow-up with neurosurgery would be required  As for his presentation; it appears to be TIA  We recommend to continue ASA+plavix for 3 weeks and then stay on plavix alone  Otherwise plan as dictated in consult note

## 2020-02-05 NOTE — PHYSICAL THERAPY NOTE
Physical Therapy Evaluation     Patient's Name: Ning Dallas    Admitting Diagnosis  TIA (transient ischemic attack) [G45 9]  Stroke-like symptom [R29 90]    Problem List  Patient Active Problem List   Diagnosis    TIA (transient ischemic attack)    Benign essential hypertension    Benign prostatic hyperplasia with urinary obstruction    Gastroesophageal reflux disease    Gout    Type [facility] without [facility]    [facility]    Mixed hyperlipidemia       Past Medical History  Past Medical History:   Diagnosis Date    Coronary artery disease     Diabetes ([facility] )     Heart attack ([facility] )     Hyperlipidemia     Hypertension     Kidney stone     TIA (transient ischemic attack)        Past Surgical History  Past Surgical History:   Procedure Laterality Date    CARDIAC SURGERY      CORONARY ANGIOPLASTY WITH STENT PLACEMENT      REPLACEMENT TOTAL KNEE BILATERAL      SINUS SURGERY      TONSILLECTOMY            02/05/20 1024   Note Type   Note type Eval/Treat   Pain Assessment   Pain Assessment No/denies pain   Pain Score No Pain   Home Living   Type of Home House   Home Layout Multi-level;Bed/bath upstairs; Performs ADLs on one level;Stairs to enter with rails; Access  (split level; 6 steps btwn levels)   Bathroom Shower/Tub Tub/shower unit   Bathroom Toilet Raised   Bathroom Equipment Grab bars in shower   [address]   (no AD at baseline)   Prior Function   Level of [place] Independent with ADLs and functional mobility   Lives With [place] Help From Family   ADL Assistance Independent   IADLs Independent   Falls in the last 6 months 0   Vocational Retired   Comments pt drives   Restrictions/Precautions   [place] Bearing Precautions Per Order No   Other Precautions Fall Risk   General   Family/Caregiver Present No   Cognition   Overall Cognitive Status WFL   Arousal/Participation Alert   Orientation Level Oriented X4   Memory Within functional limits   Following Commands Follows all commands and directions without difficulty   Comments pt agreeable to PT session   RUE Assessment   RUE Assessment WFL   LUE Assessment   LUE Assessment WFL   RLE Assessment   RLE Assessment WFL   LLE Assessment   LLE Assessment WFL   Coordination   Movements are Fluid and Coordinated 1   Sensation WFL   Bed Mobility   Sit to Supine 6  Modified independent   Additional items HOB elevated   Transfers   Sit to Stand 6  Modified independent   Stand to Sit 6  Modified independent   Additional Comments minimal unsteadiness 2/2 medication given for MRI   Ambulation/Elevation   Gait pattern WNL; Wide AMAN   Gait Assistance 6  Modified independent   Assistive Device None   Distance 60 ft   Stair Management Assistance Not tested   Balance   Static Sitting Normal   Dynamic Sitting Good   Static Standing Good   Dynamic Standing Fair +   Ambulatory Fair +   Activity Tolerance   Activity Tolerance Patient tolerated treatment well   Nurse Made Aware Yes, RN verbalized pt appropriate for PT session   Assessment   Prognosis Good   Problem List Impaired balance;Decreased mobility   Assessment Pt is 70 y o  male seen for PT evaluation on 2/5/2020 s/p admit to [address] [place] [address] on 2/4/2020 w/ TIA (transient ischemic attack); pt presented to ED c L arm weakness and numbness  PT consulted to assess pt's functional mobility and d/c needs  Order placed for PT eval and tx, w/ up and OOB as tolerated order  Performed at least 2 patient identifiers during session: Name and wristband  Comorbidities affecting pt's physical performance at time of assessment include: mixed HLD, morbid obesity, DM type 2, gout, HTN  PTA, pt was independent w/ all functional mobility w/ no AD or DME, ambulates unrestricted distances and all terrain, lives w/ spouse in split- level home and retired   Personal factors affecting pt at time of IE include: lives in multi story house, stairs to enter home and limited insight into impairments  Please find objective findings from PT assessment regarding body systems outlined above with impairments and limitations including impaired balance, decreased activity tolerance and fall risk, as well as mobility assessment (need for cueing for mobility technique)  The following objective measures performed on IE also reveal limitations: Barthel Index: 75/100  Pt's clinical presentation is currently unstable/unpredictable seen in pt's presentation of ongoing medical assessment  Pt to benefit from continued PT tx to address deficits as defined above and maximize level of functional independent mobility and consistency  From PT/mobility standpoint, recommendation at time of d/c would be OP PT pending progress in order to facilitate return to PLOF  Barriers to Discharge None   Goals   Patient Goals "to return home"   [facility] Expiration Date 02/15/20   Short Term Goal #1 In 7-10 days: Perform all transfers independently to improve independence, Ambulate > 250 ft  with least restrictive assistive device modified independent w/o LOB and w/ normalized gait pattern 100% of the time, Navigate 6+6 stairs modified independent with bilateral handrails to facilitate return to previous living environment, Increase all balance 1/2 grade to decrease risk for falls, Complete exercise program independently and PT provider will perform functional balance assessment to determine fall risk   PT Treatment Day 0   Plan   Treatment/Interventions Elevations; Therapeutic exercise;Patient/family training;Gait training;Spoke to nursing;Spoke to case management  (dynamic balance training)   PT Frequency Follow-up visit only   Recommendation   Recommendation Outpatient PT; Home with family support   Equipment Recommended   (none at this time)   PT - OK to Discharge Yes  (when medically cleared)   Barthel Index   Feeding 10   Bathing 5   Grooming Score 5   Dressing Score 10   Bladder Score 10   Bowels Score 10 Toilet Use Score 10   Transfers (Bed/Chair) Score 15   Mobility (Level Surface) Score 0   Stairs Score 0  (DNT)   Barthel Index Score 75         Compa Girard, PT

## 2020-02-05 NOTE — PLAN OF CARE
Problem: PHYSICAL THERAPY ADULT  Goal: Performs mobility at highest level of function for planned discharge setting  See evaluation for individualized goals  Description  Treatment/Interventions: Elevations, Therapeutic exercise, Patient/family training, Gait training, Spoke to nursing, Spoke to case management(dynamic balance training)  Equipment Recommended: (none at this time)       See flowsheet documentation for full assessment, interventions and recommendations  Note:   Prognosis: Good  Problem List: Impaired balance, Decreased mobility  Assessment: Pt is 70 y o  male seen for PT evaluation on 2/5/2020 s/p admit to [address] [place] [address] on 2/4/2020 w/ TIA (transient ischemic attack); pt presented to ED c L arm weakness and numbness  PT consulted to assess pt's functional mobility and d/c needs  Order placed for PT eval and tx, w/ up and OOB as tolerated order  Performed at least 2 patient identifiers during session: Name and wristband  Comorbidities affecting pt's physical performance at time of assessment include: mixed HLD, morbid obesity, DM type 2, gout, HTN  PTA, pt was independent w/ all functional mobility w/ no AD or DME, ambulates unrestricted distances and all terrain, lives w/ spouse in split- level home and retired  Personal factors affecting pt at time of IE include: lives in multi story house, stairs to enter home and limited insight into impairments  Please find objective findings from PT assessment regarding body systems outlined above with impairments and limitations including impaired balance, decreased activity tolerance and fall risk, as well as mobility assessment (need for cueing for mobility technique)  The following objective measures performed on IE also reveal limitations: Barthel Index: 75/100  Pt's clinical presentation is currently unstable/unpredictable seen in pt's presentation of ongoing medical assessment   Pt to benefit from continued PT tx to address deficits as defined above and maximize level of functional independent mobility and consistency  From PT/mobility standpoint, recommendation at time of d/c would be OP PT pending progress in order to facilitate return to PLOF  Barriers to Discharge: None     Recommendation: Outpatient PT, Home with family support     PT - OK to Discharge: Yes(when medically cleared)    See flowsheet documentation for full assessment

## 2020-02-05 NOTE — PLAN OF CARE
Problem: Potential for Falls  Goal: Patient will remain free of falls  Description  INTERVENTIONS:  - Assess patient frequently for physical needs  -  Identify cognitive and physical deficits and behaviors that affect risk of falls  -  [place] fall precautions as indicated by assessment   - Educate patient/family on patient safety including physical limitations  - Instruct patient to call for assistance with activity based on assessment  - Modify environment to reduce risk of injury  - Consider OT/PT consult to assist with strengthening/mobility  Outcome: Progressing     Problem: NEUROSENSORY - ADULT  Goal: Achieves stable or improved neurological status  Description  INTERVENTIONS  - Monitor and report changes in neurological status  - Monitor vital signs such as temperature, blood pressure, glucose, and any other labs ordered   - Initiate measures to prevent increased intracranial pressure  - Monitor for seizure activity and implement precautions if appropriate      Outcome: Progressing  Goal: Remains free of injury related to seizures activity  Description  INTERVENTIONS  - Maintain airway, patient safety  and administer oxygen as ordered  - Monitor patient for seizure activity, document and report duration and description of seizure to physician/advanced practitioner  - If seizure occurs,  ensure patient safety during seizure  - Reorient patient post seizure  - Seizure pads on all 4 side rails  - Instruct patient/family to notify RN of any seizure activity including if an aura is experienced  - Instruct patient/family to call for assistance with activity based on nursing assessment  - Administer anti-seizure medications if ordered    Outcome: Progressing  Goal: Achieves maximal functionality and self care  Description  INTERVENTIONS  - Monitor swallowing and airway patency with patient fatigue and changes in neurological status  - Encourage and assist patient to increase activity and self care     - Encourage visually impaired, hearing impaired and aphasic patients to use assistive/communication devices  Outcome: Progressing     Problem: PAIN - ADULT  Goal: Verbalizes/displays adequate comfort level or baseline comfort level  Description  Interventions:  - Encourage patient to monitor pain and request assistance  - Assess pain using appropriate pain scale  - Administer analgesics based on type and severity of pain and evaluate response  - Implement non-pharmacological measures as appropriate and evaluate response  - Consider cultural and social influences on pain and pain management  - Notify physician/advanced practitioner if interventions unsuccessful or patient reports new pain  Outcome: Progressing     Problem: INFECTION - ADULT  Goal: Absence or prevention of progression during hospitalization  Description  INTERVENTIONS:  - Assess and monitor for signs and symptoms of infection  - Monitor lab/diagnostic results  - Monitor all insertion sites, i e  indwelling lines, tubes, and drains  - Monitor endotracheal if appropriate and nasal secretions for changes in amount and color  - [place] appropriate cooling/warming therapies per order  - Administer medications as ordered  - Instruct and encourage patient and family to use good hand hygiene technique  - Identify and instruct in appropriate isolation precautions for identified infection/condition  Outcome: Progressing     Problem: SAFETY ADULT  Goal: Maintain or return to baseline ADL function  Description  INTERVENTIONS:  -  Assess patient's ability to carry out ADLs; assess patient's baseline for ADL function and identify physical deficits which impact ability to perform ADLs (bathing, care of mouth/teeth, toileting, grooming, dressing, etc )  - Assess/evaluate cause of self-care deficits   - Assess range of motion  - Assess patient's mobility; develop plan if impaired  - Assess patient's need for assistive devices and provide as appropriate  - Encourage maximum independence but intervene and supervise when necessary  - Involve family in performance of ADLs  - Assess for home care needs following discharge   - Consider OT consult to assist with ADL evaluation and planning for discharge  - Provide patient education as appropriate  Outcome: Progressing  Goal: Maintain or return mobility status to optimal level  Description  INTERVENTIONS:  - Assess patient's baseline mobility status (ambulation, transfers, stairs, etc )    - Identify cognitive and physical deficits and behaviors that affect mobility  - Identify mobility aids required to assist with transfers and/or ambulation (gait belt, sit-to-stand, lift, walker, cane, etc )  - [place] fall precautions as indicated by assessment  - Record patient progress and toleration of activity level on Mobility SBAR; progress patient to next Phase/Stage  - Instruct patient to call for assistance with activity based on assessment  - Consider rehabilitation consult to assist with strengthening/weightbearing, etc   Outcome: Progressing     Problem: DISCHARGE PLANNING  Goal: Discharge to home or other facility with appropriate resources  Description  INTERVENTIONS:  - Identify barriers to discharge w/patient and caregiver  - Arrange for needed discharge resources and transportation as appropriate  - Identify discharge learning needs (meds, wound care, etc )  - Refer to Case Management Department for coordinating discharge planning if the patient needs post-hospital services based on physician/advanced practitioner order or complex needs related to functional status, cognitive ability, or social support system   Outcome: Progressing     Problem: Knowledge Deficit  Goal: Patient/family/caregiver demonstrates understanding of disease process, treatment plan, medications, and discharge instructions  Description  Complete learning assessment and assess knowledge base    Interventions:  - Provide teaching at level of understanding  - Provide teaching via preferred learning methods  Outcome: Progressing

## 2020-02-05 NOTE — ASSESSMENT & PLAN NOTE
· Transient left arm weakness and numbness    MRI with no definitive infarct  · Recommend aspirin 81 mg daily Plavix 75 mg daily and Lipitor increased to 80 mg  · No therapy needs at discharge

## 2020-02-05 NOTE — PLAN OF CARE
Problem: Potential for Falls  Goal: Patient will remain free of falls  Description  INTERVENTIONS:  - Assess patient frequently for physical needs  -  Identify cognitive and physical deficits and behaviors that affect risk of falls    -  [place] fall precautions as indicated by assessment   - Educate patient/family on patient safety including physical limitations  - Instruct patient to call for assistance with activity based on assessment  - Modify environment to reduce risk of injury  - Consider OT/PT consult to assist with strengthening/mobility  2/5/2020 1504 by Maria Teresa Page RN  Outcome: Adequate for Discharge  2/5/2020 0926 by Maria Teresa Page RN  Outcome: Progressing     Problem: NEUROSENSORY - ADULT  Goal: Achieves stable or improved neurological status  Description  INTERVENTIONS  - Monitor and report changes in neurological status  - Monitor vital signs such as temperature, blood pressure, glucose, and any other labs ordered   - Initiate measures to prevent increased intracranial pressure  - Monitor for seizure activity and implement precautions if appropriate      2/5/2020 1504 by Maria Teresa Page RN  Outcome: Adequate for Discharge  2/5/2020 6345 by Maria Teresa Page RN  Outcome: Progressing  Goal: Remains free of injury related to seizures activity  Description  INTERVENTIONS  - Maintain airway, patient safety  and administer oxygen as ordered  - Monitor patient for seizure activity, document and report duration and description of seizure to physician/advanced practitioner  - If seizure occurs,  ensure patient safety during seizure  - Reorient patient post seizure  - Seizure pads on all 4 side rails  - Instruct patient/family to notify RN of any seizure activity including if an aura is experienced  - Instruct patient/family to call for assistance with activity based on nursing assessment  - Administer anti-seizure medications if ordered    2/5/2020 1504 by Maria Teresa Page RN  Outcome: Adequate for Discharge  2/5/2020 2601 by Ryne Desir RN  Outcome: Progressing  Goal: Achieves maximal functionality and self care  Description  INTERVENTIONS  - Monitor swallowing and airway patency with patient fatigue and changes in neurological status  - Encourage and assist patient to increase activity and self care     - Encourage visually impaired, hearing impaired and aphasic patients to use assistive/communication devices  2/5/2020 1504 by Ryne Desir RN  Outcome: Adequate for Discharge  2/5/2020 0926 by Ryne Desir RN  Outcome: Progressing     Problem: PAIN - ADULT  Goal: Verbalizes/displays adequate comfort level or baseline comfort level  Description  Interventions:  - Encourage patient to monitor pain and request assistance  - Assess pain using appropriate pain scale  - Administer analgesics based on type and severity of pain and evaluate response  - Implement non-pharmacological measures as appropriate and evaluate response  - Consider cultural and social influences on pain and pain management  - Notify physician/advanced practitioner if interventions unsuccessful or patient reports new pain  2/5/2020 1504 by Ryne Desir RN  Outcome: Adequate for Discharge  2/5/2020 0926 by Ryne Desir RN  Outcome: Progressing     Problem: INFECTION - ADULT  Goal: Absence or prevention of progression during hospitalization  Description  INTERVENTIONS:  - Assess and monitor for signs and symptoms of infection  - Monitor lab/diagnostic results  - Monitor all insertion sites, i e  indwelling lines, tubes, and drains  - Monitor endotracheal if appropriate and nasal secretions for changes in amount and color  - [place] appropriate cooling/warming therapies per order  - Administer medications as ordered  - Instruct and encourage patient and family to use good hand hygiene technique  - Identify and instruct in appropriate isolation precautions for identified infection/condition  2/5/2020 1504 by Virgie Pena Emmanuelle Reinoso RN  Outcome: Adequate for Discharge  2/5/2020 9139 by Joseph Carrillo RN  Outcome: Progressing     Problem: SAFETY ADULT  Goal: Maintain or return to baseline ADL function  Description  INTERVENTIONS:  -  Assess patient's ability to carry out ADLs; assess patient's baseline for ADL function and identify physical deficits which impact ability to perform ADLs (bathing, care of mouth/teeth, toileting, grooming, dressing, etc )  - Assess/evaluate cause of self-care deficits   - Assess range of motion  - Assess patient's mobility; develop plan if impaired  - Assess patient's need for assistive devices and provide as appropriate  - Encourage maximum independence but intervene and supervise when necessary  - Involve family in performance of ADLs  - Assess for home care needs following discharge   - Consider OT consult to assist with ADL evaluation and planning for discharge  - Provide patient education as appropriate  2/5/2020 1504 by Joseph Carrillo RN  Outcome: Adequate for Discharge  2/5/2020 0926 by Joseph Carrillo RN  Outcome: Progressing  Goal: Maintain or return mobility status to optimal level  Description  INTERVENTIONS:  - Assess patient's baseline mobility status (ambulation, transfers, stairs, etc )    - Identify cognitive and physical deficits and behaviors that affect mobility  - Identify mobility aids required to assist with transfers and/or ambulation (gait belt, sit-to-stand, lift, walker, cane, etc )  - [place] fall precautions as indicated by assessment  - Record patient progress and toleration of activity level on Mobility SBAR; progress patient to next Phase/Stage  - Instruct patient to call for assistance with activity based on assessment  - Consider rehabilitation consult to assist with strengthening/weightbearing, etc   2/5/2020 1504 by Joseph Carrillo RN  Outcome: Adequate for Discharge  2/5/2020 0926 by Joseph Carrillo RN  Outcome: Progressing     Problem: DISCHARGE PLANNING  Goal: Discharge to home or other facility with appropriate resources  Description  INTERVENTIONS:  - Identify barriers to discharge w/patient and caregiver  - Arrange for needed discharge resources and transportation as appropriate  - Identify discharge learning needs (meds, wound care, etc )  - Refer to Case Management Department for coordinating discharge planning if the patient needs post-hospital services based on physician/advanced practitioner order or complex needs related to functional status, cognitive ability, or social support system   2/5/2020 1504 by Peace Lemos RN  Outcome: Adequate for Discharge  2/5/2020 0926 by Peace Lemos RN  Outcome: Progressing     Problem: Knowledge Deficit  Goal: Patient/family/caregiver demonstrates understanding of disease process, treatment plan, medications, and discharge instructions  Description  Complete learning assessment and assess knowledge base    Interventions:  - Provide teaching at level of understanding  - Provide teaching via preferred learning methods  2/5/2020 1504 by Peace Lemos RN  Outcome: Adequate for Discharge  2/5/2020 9531 by Peace Lemos RN  Outcome: Progressing

## 2020-02-05 NOTE — ASSESSMENT & PLAN NOTE
Lab Results   Component Value Date    HGBA1C 6 0 02/05/2020       Recent Labs     02/04/20  1322 02/04/20  1618   POCGLU 147* 134       Blood Sugar Average: Last 72 hrs:  (P) 140 5   · Resume home medication

## 2020-02-05 NOTE — OCCUPATIONAL THERAPY NOTE
Pt is a 70 y o  male seen for OT evaluation s/p admit to [address] on 2/4/2020 w/ TIA (transient ischemic attack)  Comorbidities affecting pt's functional performance at time of assessment include: DM, HTN, obesity and GERD and Gout  Personal factors affecting pt at time of IE include:steps to enter environment  Prior to admission, pt was I with self care ADL's, IADL's, Mobility w/o devices and volunteering in the community  Lenka Nielson Upon evaluation: Pt appears to be functioning atbaseline with self care  No OT needs were identified  No deficits noted   From OT standpoint, recommendation at time of d/c would be home independently, with family

## 2020-02-05 NOTE — SPEECH THERAPY NOTE
Pt admitted on stroke pathway for left arm numbness/weakness  At this time symptoms resolved  No observed/documented, reported speech, swallow deficits  Pt passed his RN dysphagia screen and  Is tolerating regular meal and medications  Order acknowledged and completed  This is a screen only  Mateus Loving, CCC/SLP  [id number]  [email or web address]  Available via tiger text

## 2020-02-05 NOTE — NURSING NOTE
Patient medically cleared to be DC home today, IV & tele removed, DC instructions reviewed and given to patient, pt instructed to continue to take all med's as prescribed and to follow up with all MD appointments, pt verbalized understanding

## 2020-02-05 NOTE — SOCIAL WORK
Evaluated the pt at the bedside  Pt was admitted to the hospital with a TIA  Explained the role of CM and the options of discharge planning with the pt  Pt reports he lives with his wife in a bi-level home with 8 DIANDRA off deck in the back, 6 through the garage and another 6 to the first level  Pt has a walker, cane, lift chair, shower chair  Pt gets his medications at the [address] [facility]  Pt states he is independent and drives  Pt states his wife will transport him home upon discharge  PT is recommending home PT  Pt declined same  Patient/caregiver received discharge checklist   Content reviewed  Patient/caregiver encouraged to participate in discharge plan of care prior to discharge home  CM reviewed d/c planning process including the following: identifying help at home, patient preference for d/c planning needs, availability of treatment team to discuss questions or concerns patient and/or family may have regarding understanding medications and recognizing signs and symptoms once discharged  CM also encouraged patient to follow up with all recommended appointments after discharge  Patient advised of importance for patient and family to participate in managing patients medical well being

## 2020-02-06 ENCOUNTER — TRANSCRIBE ORDERS (OUTPATIENT)
Dept: NEUROSURGERY | Facility: CLINIC | Age: 80
End: 2020-02-06

## 2020-02-06 DIAGNOSIS — D33.3 VESTIBULAR SCHWANNOMA (HCC): Primary | ICD-10-CM

## 2020-02-06 DIAGNOSIS — I67.2 ATHEROSCLEROTIC CEREBROVASCULAR DISEASE: ICD-10-CM

## 2020-02-10 NOTE — PHYSICIAN ADVISOR
Current patient class: Inpatient  The patient is currently on Hospital Day: 2 at [address]      The patient was admitted to the hospital  on 2/4/20 at [phone] for the following diagnosis:  TIA (transient ischemic attack) [G45 9]  Stroke-like symptom [R29 90]     After review of the relevant documentation, labs, vital signs and test results, the patient is a provider liable case and is most appropriate for OBSERVATION STATUS  In this particular case the patient was admitted to the hospital as an inpatient  The patient however fails to satisfy the 2 midnight benchmark and closer scrutiny of the case is warranted  After review of the patient presentation and relevant labs the patient was most appropriate for observation status on admission  Given that this patient has already been discharged prior to this review they become a provider liable case  Rationale is as follows: The patient is a 70 yrs   Male who presented to the ED at 2/4/2020  1:25 PM with a chief complaint of STROKE Alert     Patient admitted with a report of onset of weakness and numbness  to his left arm which lasted about 30 minutes and completely resolved  He was evaluated by Neurology via telemedicine and they beleived the presentation could be ischemic and agreed with a stroke pathway  Imaging studies did not reveal any acute pathology and Neurology felt the presentation represented a TIA  The patient was able to be discharged after a one night stay in the hospital and an OBSERVATION class would be considered appropriate for him          The patients vitals on arrival were ED Triage Vitals   Temperature Pulse Respirations Blood Pressure SpO2   02/04/20 1423 02/04/20 1405 02/04/20 1405 02/04/20 1405 02/04/20 1405   98 1 °F (36 7 °C) 68 16 142/64 97 %      Temp Source Heart Rate Source Patient Position - Orthostatic VS BP Location FiO2 (%)   02/04/20 1423 02/04/20 1405 02/04/20 1615 02/04/20 1615 -- Temporal Monitor Sitting Left arm       Pain Score       02/04/20 1327       No Pain           Past Medical History:   Diagnosis Date    Coronary artery disease     Diabetes [facility] [address] [facility]     Heart attack ([facility] [address] [facility]     Hyperlipidemia     Hypertension     Kidney stone     TIA (transient ischemic attack)      Past Surgical History:   Procedure Laterality Date    CARDIAC SURGERY      CORONARY ANGIOPLASTY WITH STENT PLACEMENT      REPLACEMENT TOTAL KNEE BILATERAL      SINUS SURGERY      TONSILLECTOMY             Consults have been placed to:   IP CONSULT TO NEUROLOGY  IP CONSULT TO CASE MANAGEMENT    Vitals:    02/05/20 0120 02/05/20 0320 02/05/20 0720 02/05/20 1120   BP: 133/72 141/63 136/80 166/81   BP Location: Right arm Left arm Left arm Right arm   Pulse: 64 63 66 63   Resp: 18 18 18 18   Temp: 98 1 °F (36 7 °C) (!) 97 1 °F (36 2 °C) (!) 97 3 °F (36 3 °C) (!) 97 4 °F (36 3 °C)   TempSrc: Tympanic Temporal Tympanic Tympanic   SpO2: 96% 97% 96% 98%   Weight:       Height:           Most recent labs:    No results for input(s): WBC, HGB, HCT, PLT, K, NA, CALCIUM, BUN, CREATININE, LIPASE, AMYLASE, INR, TROPONINI, CKTOTAL, AST, ALT, ALKPHOS, BILITOT in the last 72 hours  Scheduled Meds:  Continuous Infusions:  No current facility-administered medications for this encounter  PRN Meds:      Surgical procedures (if appropriate):

## 2020-02-19 NOTE — PATIENT INSTRUCTIONS
Call Neurology - stroke team  Per note in hospital plan was to take Aspirin 81mg daily and Plavix/clopidogrel 75mg daily x 3 weeks then transition to Plavix only  **Discuss with Dr Adryan Rosario - discuss refill**    · Dr Rajani Wang - neurosurgeon who would be involved with stereotactic radiation surgery if indicated     Acoustic Neuroma/  Vestibular schwannoma     WHAT YOU NEED TO KNOW:   What is an acoustic neuroma (AN)? AN is a slow growing, benign (not cancer) tumor  The tumor grows on the nerves that control balance, hearing, and feeling in your face  AN also grows on the nerve that moves muscles used for chewing  Rarely, it grows large enough to block fluid from going to your brain  Normally, it only affects one ear  What causes an acoustic neuroma? The exact cause is not known  It occurs when your nerve cells grow out of control  This forms the tumor  You may be at higher risk for AN if you have neurofibromatosis 2  What are the signs and symptoms of an acoustic neuroma? · Partial or total hearing loss in one ear     · Numbness, weakness, or twitching on one side of your face     · Ringing in your ear     · Dizziness, poor balance, or trouble walking    · Full feeling or pain in your ear    · Headaches or trouble chewing  How is AN diagnosed? · A hearing test  checks how sensitive your ears are to sounds at different volumes  This test will check for hearing loss  · A CT , or CAT scan, takes pictures of your skull and brain  You may be given contrast liquid before the scan  Tell a healthcare provider if you have ever had an allergic reaction to contrast liquid  · An MRI of the head  takes pictures of your brain, blood vessels, and skull  You may be given contrast liquid to help the pictures show up better  Tell a healthcare provider if you have ever had an allergic reaction to contrast liquid  Do not enter the MRI room with anything metal  Metal can cause serious injury   Tell a healthcare provider if you have any metal in or on your body  How is AN treated? Treatment depends on how severe your symptoms are and the size of the tumor  Treatment may also depend on your age  You may not need treatment if the tumor is small  Your healthcare provider may want to monitor your AN with regular MRI scans  You may need surgery to remove all or part of your AN  You may, instead, need radiation treatment to shrink your AN  When should I contact my healthcare provider? · Your symptoms get worse  · Your symptoms return after treatment  · You have questions or concerns about your condition or care  CARE AGREEMENT:   You have the right to help plan your care  Learn about your health condition and how it may be treated  Discuss treatment options with your caregivers to decide what care you want to receive  You always have the right to refuse treatment  The above information is an  only  It is not intended as medical advice for individual conditions or treatments  Talk to your doctor, nurse or pharmacist before following any medical regimen to see if it is safe and effective for you  © 2017 [facility] Information is for End User's use only and may not be sold, redistributed or otherwise used for commercial purposes  All illustrations and images included in CareNotes® are the copyrighted property of A D A M , Inc  or David France      Prior to next visit:  · Complete MRI brain with contrast  · Complete hearing test

## 2020-02-20 ENCOUNTER — CONSULT (OUTPATIENT)
Dept: NEUROSURGERY | Facility: CLINIC | Age: 80
End: 2020-02-20
Payer: MEDICARE

## 2020-02-20 VITALS
SYSTOLIC BLOOD PRESSURE: 124 MMHG | HEART RATE: 64 BPM | DIASTOLIC BLOOD PRESSURE: 88 MMHG | RESPIRATION RATE: 16 BRPM | HEIGHT: 75 IN | WEIGHT: 315 LBS | BODY MASS INDEX: 39.17 KG/M2 | TEMPERATURE: 96.9 F

## 2020-02-20 DIAGNOSIS — Z01.818 PRE-PROCEDURAL EXAMINATION: ICD-10-CM

## 2020-02-20 DIAGNOSIS — D33.3 VESTIBULAR SCHWANNOMA (HCC): ICD-10-CM

## 2020-02-20 DIAGNOSIS — I67.2 ATHEROSCLEROTIC CEREBROVASCULAR DISEASE: ICD-10-CM

## 2020-02-20 DIAGNOSIS — F41.9 ANXIETY: ICD-10-CM

## 2020-02-20 DIAGNOSIS — H91.92 HEARING LOSS OF LEFT EAR, UNSPECIFIED HEARING LOSS TYPE: Primary | ICD-10-CM

## 2020-02-20 PROCEDURE — 99204 OFFICE O/P NEW MOD 45 MIN: CPT | Performed by: PHYSICIAN ASSISTANT

## 2020-02-20 RX ORDER — ALPRAZOLAM 0.5 MG/1
0.5 TABLET ORAL DAILY PRN
Qty: 2 TABLET | Refills: 0 | Status: SHIPPED | OUTPATIENT
Start: 2020-02-20 | End: 2020-03-09 | Stop reason: ALTCHOICE

## 2020-02-20 RX ORDER — ISOSORBIDE DINITRATE 30 MG/1
30 TABLET ORAL DAILY
COMMUNITY

## 2020-02-20 RX ORDER — AMOXICILLIN 500 MG
1 CAPSULE ORAL 2 TIMES DAILY
COMMUNITY

## 2020-02-20 NOTE — ASSESSMENT & PLAN NOTE
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 for which he was referred to Neurosurgery  - admits to left hearing loss and brief positional dizziness  Imaging:  · MRI brain without contrast 2/5/20:  Left CP angle mass extending into the IAC and CP angle cistern that has imaging characteristics typical for vestibular schwannoma  Plan:  · Monitor neurologic status  Diminished right hearing  Cranial nerves grossly intact  Strength and sensation intact  · MRI imaging reviewed in detail with the patient and his wife  · Patient does require additional workup for abnormality noted at left CP angle  · MRI brain with without contrast including IAC ordered  Patient had difficulty with MRI in the past therefore Xanax has been ordered prior to MRI  · Patient denies any history of kidney disease  Order placed to check creatinine prior to contrasted MRI  · Given chronic hearing loss along with presumed left distributor schwannoma, referral placed for formal audiogram    · Discussed possible intervention to include radiation pending results of further work-up     · Patient to follow-up outpatient upon completion of the MRI and audiogram

## 2020-02-20 NOTE — PROGRESS NOTES
Neurosurgery Office Note  Lino Hearing 70 y o  male MRN: [id number]      Assessment/Plan     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 for which he was referred to Neurosurgery  - admits to left hearing loss and brief positional dizziness  Imaging:  · MRI brain without contrast 2/5/20:  Left CP angle mass extending into the IAC and CP angle cistern that has imaging characteristics typical for vestibular schwannoma  Plan:  · Monitor neurologic status  Diminished right hearing  Cranial nerves grossly intact  Strength and sensation intact  · MRI imaging reviewed in detail with the patient and his wife  · Patient does require additional workup for abnormality noted at left CP angle  · MRI brain with without contrast including IAC ordered  Patient had difficulty with MRI in the past therefore Xanax has been ordered prior to MRI  · Patient denies any history of kidney disease  Order placed to check creatinine prior to contrasted MRI  · Given chronic hearing loss along with presumed left distributor schwannoma, referral placed for formal audiogram    · Discussed possible intervention to include radiation pending results of further work-up  · Patient to follow-up outpatient upon completion of the MRI and audiogram         Diagnoses and all orders for this visit:    Hearing loss of left ear, unspecified hearing loss type  -     Ambulatory referral to Audiology; Future    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)  -     Ambulatory referral to Neurosurgery  -     MRI brain IAC wo and w contrast; Future  -     Ambulatory referral to Audiology; Future    Atherosclerotic cerebrovascular disease  -     Ambulatory referral to Neurosurgery    Anxiety  -     ALPRAZolam (XANAX) 0 5 mg tablet;  Take 1 tablet (0 5 mg total) by mouth daily as needed for Sent by psychiatrist for further evaluation of weight loss. anxiety (take 1 hr prior to MRI, take second pill prior to MRI if needed)    Pre-procedural examination  -     Basic metabolic panel; Future    Other orders  -     isosorbide dinitrate (ISORDIL) 30 mg tablet; Take 30 mg by mouth daily  -     Omega-3 Fatty Acids (FISH OIL) 1200 MG CAPS; Take 1 capsule by mouth 2 (two) times a day            CHIEF COMPLAINT    Chief Complaint   Patient presents with    Consult     Vestibular schwannoma       HISTORY    History of Present Illness     70 y o   male past medical history significant for heart attack x3, TIA x2, hypertension, hyperlipidemia, CAD and diabetes who presents today for consultation of left vestibular schwannoma  On February 5, 2020 patient developed abnormal sensation in his left hand which progressed to full left arm numbness and plegia  He states this slowly improved over approximately an hour and a half  He underwent stroke workup which included an MRI brain that demonstrated a left CP angle mass most consistent with a vestibular schwannoma  Patient admits to chronic hearing loss worse in the left ear as he worked in the [employer] for 33 years  He does admit to having his hearing followed prior with the [facility]  Patient admits to dizziness when bending down along with quick position changes or standing  Does admit to a history of vertigo for which he was hospitalized twice in the past   Patient does note room spinning when he lies on his left side which quickly resolves but does not note this when he goes to lie on his right side  He admits to 1-2 months posterior and bifrontal headaches rated two to 4/10 for which he suddenly uses Aleve  REVIEW OF SYSTEMS    Review of Systems   Constitutional: Negative  HENT: Positive for tinnitus (a long time)  Eyes: Negative  Respiratory: Positive for shortness of breath (with activities)  Cardiovascular: Negative  Gastrointestinal: Negative  Endocrine: Negative  Genitourinary: Negative  Musculoskeletal: Negative  Skin: Negative  Allergic/Immunologic: Negative  Neurological: Positive for dizziness (vertigo), light-headedness (when he tries to walk) and numbness (about 2 weeks ago left arm went numb and he couldn't use it )  Negative for tremors, seizures, speech difficulty, weakness and headaches  Hematological: Bruises/bleeds easily  Psychiatric/Behavioral: Negative  Meds/Allergies     Current Outpatient Medications   Medication Sig Dispense Refill    amLODIPine (NORVASC) 5 mg tablet Take 5 mg by mouth daily      aspirin 81 mg chewable tablet Chew 1 tablet (81 mg total) daily for 21 days  0    atorvastatin (LIPITOR) 80 mg tablet Take 1 tablet (80 mg total) by mouth every evening 30 tablet 0    cholecalciferol (VITAMIN D3) 1,000 units tablet Take 1,000 Units by mouth daily      Ginger, Zingiber officinalis, (GINGER ROOT PO) Take by mouth      isosorbide dinitrate (ISORDIL) 30 mg tablet Take 30 mg by mouth daily      metFORMIN (GLUCOPHAGE) 500 mg tablet Take 500 mg by mouth 2 (two) times a day with meals       metoprolol tartrate (LOPRESSOR) 25 mg tablet Take 25 mg by mouth every 12 (twelve) hours       Multiple Vitamins-Minerals (CENTRUM SILVER 50+MEN PO) Take 1 tablet by mouth daily      Omega-3 Fatty Acids (FISH OIL) 1200 MG CAPS Take 1 capsule by mouth 2 (two) times a day      ALPRAZolam (XANAX) 0 5 mg tablet Take 1 tablet (0 5 mg total) by mouth daily as needed for anxiety (take 1 hr prior to MRI, take second pill prior to MRI if needed) 2 tablet 0     No current facility-administered medications for this visit          Allergies   Allergen Reactions    Lisinopril Anaphylaxis     Anaphylaxis    Bacitracin Rash       PAST HISTORY    Past Medical History:   Diagnosis Date    Coronary artery disease     Diabetes [facility]     Heart attack ([facility] )     Hyperlipidemia     Hypertension     Kidney stone     TIA (transient ischemic attack)        Past Surgical History:   Procedure Laterality Date    CARDIAC SURGERY      CORONARY ANGIOPLASTY WITH STENT PLACEMENT      REPLACEMENT TOTAL KNEE BILATERAL      SINUS SURGERY      TONSILLECTOMY         Social History     Tobacco Use    Smoking status: Never Smoker    Smokeless tobacco: Never Used   Substance Use Topics    Alcohol use: Never     Frequency: Monthly or less    Drug use: Never       Family History   Problem Relation Age of Onset    Alzheimer's disease Mother     Pulmonary fibrosis Father          Above history personally reviewed  EXAM    Vitals:Blood pressure 124/88, pulse 64, temperature (!) 96 9 °F (36 1 °C), temperature source Tympanic, resp  rate 16, height 6' 3" (1 905 m), weight (!) 147 kg (325 lb)  ,Body mass index is 40 62 kg/m²  Physical Exam   Constitutional: He is oriented to person, place, and time  He appears well-developed and well-nourished  No distress  HENT:   Head: Normocephalic and atraumatic  Eyes: Pupils are equal, round, and reactive to light  Conjunctivae and EOM are normal  No scleral icterus  Neck: Normal range of motion  Neck supple  Cardiovascular: Normal rate  Pulmonary/Chest: Effort normal  No respiratory distress  Abdominal: Soft  He exhibits no distension  There is no tenderness  Musculoskeletal: He exhibits no tenderness  Neurological: He is oriented to person, place, and time  He has normal strength  He has a normal Finger-Nose-Finger Test    Reflex Scores:       Bicep reflexes are 2+ on the right side and 2+ on the left side  Brachioradialis reflexes are 2+ on the right side and 2+ on the left side  Skin: Skin is warm and dry  Psychiatric: He has a normal mood and affect  His speech is normal and behavior is normal  Judgment and thought content normal        Neurologic Exam     Mental Status   Oriented to person, place, and time  Follows 2 step commands     Attention: normal  Concentration: normal    Speech: speech is normal   Level of consciousness: alert  Knowledge: good  Able to perform simple calculations  Able to name object  Normal comprehension  Cranial Nerves     CN III, IV, VI   Pupils are equal, round, and reactive to light  Extraocular motions are normal    Right pupil: Shape: regular  Reactivity: brisk  Left pupil: Shape: regular  Reactivity: brisk  Nystagmus: none   Upgaze: normal  Conjugate gaze: present    CN V   Facial sensation intact  CN VII   Facial expression full, symmetric  CN VIII   Hearing: impaired  Right Rinne: AC > BC  Left Rinne: BC > AC  Taylor: does not lateralize     CN XI   Right trapezius strength: normal  Left trapezius strength: normal    CN XII   Tongue: not atrophic  Fasciculations: absent  Tongue deviation: none    Motor Exam   Muscle bulk: normal  Overall muscle tone: normal  Right arm pronator drift: absent  Left arm pronator drift: absent    Strength   Strength 5/5 throughout  Sensory Exam   Light touch normal      Gait, Coordination, and Reflexes     Coordination   Finger to nose coordination: normal    Tremor   Resting tremor: absent  Intention tremor: absent  Action tremor: absent    Reflexes   Right brachioradialis: 2+  Left brachioradialis: 2+  Right biceps: 2+  Left biceps: 2+  Right Gonzalez: absent  Left Gonzalez: absent        MEDICAL DECISION MAKING    Imaging Studies:     Mri Brain Wo Contrast    Result Date: 2/5/2020  Narrative: MRI BRAIN WITHOUT CONTRAST INDICATION: left upper extremity weakness and numbness  COMPARISON:   None  TECHNIQUE:  Sagittal T1, axial T2, axial FLAIR, axial T1, axial gradient, and axial diffusion imaging  IMAGE QUALITY:  Diagnostic  FINDINGS: BRAIN PARENCHYMA:  There is no discrete mass, mass effect or midline shift  There is no intracranial hemorrhage  There is no evidence of acute infarction and diffusion imaging is unremarkable   Small scattered hyperintensities on T2/FLAIR imaging are noted in the periventricular and subcortical white matter demonstrating an appearance that is statistically most likely to represent mild microangiopathic change  There is a mass identified in the left IAC with widening of the porus acoustic is extending into the CP angle cistern contacting the lateral aspect of the cindy  Pre and postcontrast gadolinium enhanced MRI of the IAC is recommended to further evaluate  This likely represents a vestibular schwannoma  VENTRICLES:  Normal for the patient's age  SELLA AND PITUITARY GLAND:  Normal  ORBITS:  Collateral lens replacement PARANASAL SINUSES:  Normal  VASCULATURE:  Evaluation of the major intracranial vasculature demonstrates appropriate flow voids  CALVARIUM AND SKULL BASE:  Normal  EXTRACRANIAL SOFT TISSUES:  Normal      Impression: Left CP angle mass extending into the IAC and CP angle cistern and has imaging characteristics typical for vestibular schwannoma  Definitive characterization with gadolinium-enhanced MRI of the brain and IACs recommended  No acute intracranial abnormality  Mild chronic microangiopathic changes  The study was marked in [facility] for immediate notification  Workstation performed: [id number]     I have personally reviewed pertinent reports     and I have personally reviewed pertinent films in PACS

## 2020-03-06 ENCOUNTER — HOSPITAL ENCOUNTER (OUTPATIENT)
Dept: MRI IMAGING | Facility: HOSPITAL | Age: 80
Discharge: HOME | End: 2020-03-06
Payer: MEDICARE

## 2020-03-06 DIAGNOSIS — D33.3 VESTIBULAR SCHWANNOMA (HCC): ICD-10-CM

## 2020-03-06 PROCEDURE — A9585 GADOBUTROL INJECTION: HCPCS | Performed by: PHYSICIAN ASSISTANT

## 2020-03-06 PROCEDURE — 70553 MRI BRAIN STEM W/O & W/DYE: CPT

## 2020-03-06 RX ADMIN — GADOBUTROL 13 ML: 604.72 INJECTION INTRAVENOUS at 21:42

## 2020-03-09 ENCOUNTER — OFFICE VISIT (OUTPATIENT)
Dept: NEUROSURGERY | Facility: CLINIC | Age: 80
End: 2020-03-09
Payer: MEDICARE

## 2020-03-09 VITALS
SYSTOLIC BLOOD PRESSURE: 122 MMHG | WEIGHT: 315 LBS | HEIGHT: 75 IN | RESPIRATION RATE: 16 BRPM | HEART RATE: 68 BPM | BODY MASS INDEX: 39.17 KG/M2 | DIASTOLIC BLOOD PRESSURE: 84 MMHG | TEMPERATURE: 98.7 F

## 2020-03-09 DIAGNOSIS — D33.3 VESTIBULAR SCHWANNOMA (HCC): Primary | ICD-10-CM

## 2020-03-09 PROCEDURE — 99214 OFFICE O/P EST MOD 30 MIN: CPT | Performed by: PHYSICIAN ASSISTANT

## 2020-03-09 NOTE — PATIENT INSTRUCTIONS
Return in 1 year after repeat MRI brain   Obtain bloodwork prior to MRI    Call 1 week prior to MRI for xanax prescription to be sent to your pharmacy

## 2020-03-09 NOTE — ASSESSMENT & PLAN NOTE
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  - also worked in [employer] for many years resulting in chronic hearing loss bilaterally    Imaging:  · MRI brain without contrast 2/5/20:  Left CP angle mass extending into the IAC and CP angle cistern that has imaging characteristics typical for vestibular schwannoma  · MRI IACs with and without contrast, 3/6/20: 2 1 x 1 8 cm left CP angle mass extending into the IAC, most consistent with acoustic neuroma  Plan:  · MRI imaging reviewed in detail with the patient and his wife by myself and Dr Prashant Morejon  · Discussed that surgery at this time would not improve his hearing and in fact could worsen it  Also would not improve his dizziness  · SRS discussed, patient may benefit in the future if mass grows but again may cause more harm than good at this time  · We will follow this with serial imaging  6 month MRI recommended, but patient requested 1 year follow up due to severe claustrophobia  This is reasonable  He will require xanax prior to MRI in 1 year

## 2020-03-09 NOTE — PROGRESS NOTES
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  - also worked in [employer] for many years resulting in chronic hearing loss bilaterally    Imaging:  · MRI brain without contrast 2/5/20:  Left CP angle mass extending into the IAC and CP angle cistern that has imaging characteristics typical for vestibular schwannoma  · MRI IACs with and without contrast, 3/6/20: 2 1 x 1 8 cm left CP angle mass extending into the IAC, most consistent with acoustic neuroma  Plan:  · MRI imaging reviewed in detail with the patient and his wife by myself and Dr Chencho Jacobs  · Discussed that surgery at this time would not improve his hearing and in fact could worsen it  Also would not improve his dizziness  · SRS discussed, patient may benefit in the future if mass grows but again may cause more harm than good at this time  · We will follow this with serial imaging  6 month MRI recommended, but patient requested 1 year follow up due to severe claustrophobia  This is reasonable  He will require xanax prior to MRI in 1 year  Diagnoses and all orders for this visit:    Vestibular schwannoma ([facility] )  -     MRI brain IAC wo and w contrast; Future  -     BUN; Future  -     Creatinine, serum; Future          Subjective:      Patient ID: Odin Lewis is a 68 y o  male with a PMH significant for MI x3, TIA x2, HTN, HLD, CAD, DM2 who is here today to review MRI IACs  On 2/5/20, he developed abnormal sensation in his left hand which progressed to full left arm numbness and plegia  This slowly did improve over approximately 90 minutes  He underwent stroke workup and MRI brain without contrast showed an incidental finding of a left CP angle mass   He does have chronic hearing loss secondary to working in the [employer] for years, but does state that his left ear is much worse than the right  He also admits to vertigo when he ambulates but denies syncopal episodes or falls  He is here today to review MRI IACs, which confirms the presence of a left CP angle mass  There is mild mass effect on the lateral aspect of the cindy but no edema is appreciated  His treatment options were discussed in detail, including surgical resection, SRS, and observation  We recommend observation at this time as resection or SRS will not improve his hearing or vertigo  Both him and his wife were in complete agreement, and he will return in 1 year with repeat imaging  The following portions of the patient's history were reviewed and updated as appropriate:   He  has a past medical history of Coronary artery disease, Diabetes ([facility] ), Heart attack ([facility] ), Hyperlipidemia, Hypertension, Kidney stone, and TIA (transient ischemic attack)  He   Patient Active Problem List    Diagnosis Date Noted    Vestibular schwannoma ([place] [address] ) 02/05/2020    Gastroesophageal reflux disease 02/04/2020    Gout 02/04/2020    TIA (transient ischemic attack)     Type 2 diabetes mellitus without complication ([facility] ) 17/14/5721    Mixed hyperlipidemia 05/27/2016    Benign prostatic hyperplasia with urinary obstruction 08/13/2015    Benign essential hypertension 10/09/2012    Morbid obesity ([facility] ) 10/09/2012     He  has a past surgical history that includes Tonsillectomy; Replacement total knee bilateral; Sinus surgery; Coronary angioplasty with stent; and Cardiac surgery  His family history includes Alzheimer's disease in his mother; Pulmonary fibrosis in his father  He  reports that he has never smoked  He has never used smokeless tobacco  He reports that he does not drink alcohol or use drugs    Current Outpatient Medications   Medication Sig Dispense Refill    amLODIPine (NORVASC) 5 mg tablet Take 5 mg by mouth daily      aspirin 81 mg chewable tablet Chew 1 tablet (81 mg total) daily for 21 days  0    atorvastatin (LIPITOR) 80 mg tablet Take 1 tablet (80 mg total) by mouth every evening 30 tablet 0    cholecalciferol (VITAMIN D3) 1,000 units tablet Take 1,000 Units by mouth daily      Ginger, Zingiber officinalis, (GINGER ROOT PO) Take by mouth      isosorbide dinitrate (ISORDIL) 30 mg tablet Take 30 mg by mouth daily      metFORMIN (GLUCOPHAGE) 500 mg tablet Take 500 mg by mouth 2 (two) times a day with meals       metoprolol tartrate (LOPRESSOR) 25 mg tablet Take 25 mg by mouth every 12 (twelve) hours       Multiple Vitamins-Minerals (CENTRUM SILVER 50+MEN PO) Take 1 tablet by mouth daily      Omega-3 Fatty Acids (FISH OIL) 1200 MG CAPS Take 1 capsule by mouth 2 (two) times a day       No current facility-administered medications for this visit  He is allergic to lisinopril and bacitracin       Review of Systems   Constitutional: Negative  HENT: Negative  Eyes: Negative  Respiratory: Positive for shortness of breath  Cardiovascular: Negative  Gastrointestinal: Negative  Endocrine: Negative  Genitourinary: Negative  Musculoskeletal: Positive for gait problem  Skin: Negative  Allergic/Immunologic: Negative  Neurological: Positive for dizziness and light-headedness  Hematological:        ASA 81 mg   Fish oil    Psychiatric/Behavioral: Positive for confusion  Memory loss    All other systems reviewed and are negative  ROS was personally reviewed and changes made as needed     Objective:      /84 (BP Location: Left arm, Patient Position: Sitting, Cuff Size: Large)   Pulse 68   Temp 98 7 °F (37 1 °C) (Tympanic)   Resp 16   Ht 6' 3" (1 905 m)   Wt (!) 148 kg (327 lb)   BMI 40 87 kg/m²          Physical Exam   Constitutional: He is oriented to person, place, and time  He appears well-developed and well-nourished  HENT:   Head: Normocephalic and atraumatic     Right Ear: Hearing normal    Left Ear: Decreased hearing is noted  Eyes: Pupils are equal, round, and reactive to light  EOM are normal    Neck: Normal range of motion  Cardiovascular: Normal rate  Pulmonary/Chest: Effort normal  No respiratory distress  Abdominal: Soft  Musculoskeletal: Normal range of motion  Neurological: He is alert and oriented to person, place, and time  He has normal strength and normal reflexes  He displays no tremor  No cranial nerve deficit or sensory deficit  He exhibits normal muscle tone  Coordination and gait normal  GCS eye subscore is 4  GCS verbal subscore is 5  GCS motor subscore is 6  Skin: Skin is warm and dry  Psychiatric: He has a normal mood and affect  His behavior is normal  Judgment and thought content normal    Nursing note and vitals reviewed

## 2020-03-17 ENCOUNTER — CONSULT (OUTPATIENT)
Dept: NEUROLOGY | Facility: CLINIC | Age: 80
End: 2020-03-17
Payer: MEDICARE

## 2020-03-17 VITALS
RESPIRATION RATE: 16 BRPM | SYSTOLIC BLOOD PRESSURE: 117 MMHG | DIASTOLIC BLOOD PRESSURE: 70 MMHG | HEART RATE: 63 BPM | WEIGHT: 315 LBS | BODY MASS INDEX: 39.17 KG/M2 | HEIGHT: 75 IN

## 2020-03-17 DIAGNOSIS — R06.83 SNORES: ICD-10-CM

## 2020-03-17 DIAGNOSIS — E66.01 MORBID OBESITY (HCC): Chronic | ICD-10-CM

## 2020-03-17 DIAGNOSIS — G47.19 EXCESSIVE DAYTIME SLEEPINESS: ICD-10-CM

## 2020-03-17 DIAGNOSIS — Z86.73 HISTORY OF TIA (TRANSIENT ISCHEMIC ATTACK): Primary | ICD-10-CM

## 2020-03-17 DIAGNOSIS — E11.9 TYPE 2 DIABETES MELLITUS WITHOUT COMPLICATION, WITHOUT LONG-TERM CURRENT USE OF INSULIN (HCC): Chronic | ICD-10-CM

## 2020-03-17 DIAGNOSIS — I10 BENIGN ESSENTIAL HYPERTENSION: Chronic | ICD-10-CM

## 2020-03-17 DIAGNOSIS — E78.2 MIXED HYPERLIPIDEMIA: Chronic | ICD-10-CM

## 2020-03-17 PROCEDURE — 99214 OFFICE O/P EST MOD 30 MIN: CPT | Performed by: PSYCHIATRY & NEUROLOGY

## 2020-03-17 RX ORDER — CLOPIDOGREL BISULFATE 75 MG/1
75 TABLET ORAL DAILY
Qty: 90 TABLET | Refills: 3 | Status: SHIPPED | OUTPATIENT
Start: 2020-03-17 | End: 2020-03-26 | Stop reason: SDUPTHER

## 2020-03-17 NOTE — PATIENT INSTRUCTIONS
Transient ischemic attack:  Shantel Pandey presents for a hospital follow-up with regard to his recent transient ischemic attack  In early February 2020 presented to the hospital with acute onset of left upper extremity weakness  The weakness was rather profound, significant enough to produce left upper extremity paralysis, but it did thankfully resolved  He reports no residual symptoms  I reviewed his MRI in the office today and there is a tiny area in the right cortical region that might indicate this was a tiny stroke rather than a transient ischemic attack  Either way, will plan to treat him accordingly  He takes his medications as prescribed since his hospitalization  Reports some fatigue but no new stroke-like symptoms  He has some easy bruising but no severe bleeding issues  Notably he does have some left atrial enlargement on his echocardiogram along with some excessive daytime sleepiness which concerns me for the possibility of obstructive sleep apnea which is a risk factors for stroke and also could lead to an increased risk of atrial fibrillation     - for ongoing stroke prevention for the time being he should continue his combination of aspirin, Plavix, Lipitor, and appropriate blood pressure and glycemic control  - we will defer to the good judgment of his primary care team for monitoring of his cholesterol panel and blood sugar numbers  He recently completed his 80 mg atorvastatin prescription and has returned to 40 mg  He notes that he has been more fatigued since his recent hospitalization  If his fatigued resolves with his atorvastatin at 40 mg then he should remain at that dose but if it does not make any appreciable difference I would suggest that he should be at 80 mg of atorvastatin in spite of his good cholesterol profile because 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    - we will plan for the combination of aspirin and Plavix to persist for 3 months from the time of his event  As of May 1st he could discontinue the use of aspirin and remain on Plavix monotherapy  - he should continue to be physically active in as much as he feels capable of doing so  -considering that he does have risk for obstructive Sleep apnea and based on his description of symptoms in the office including excessive daytime sleepiness, snoring, and some gasping at night I will provide him a prescription for a home sleep study  If this does show sleep apnea we will refer him to be seen at the [facility] [address], and at that point we would also plan for cardiac monitoring to ensure that he does not have paroxysmal atrial fibrillation   - he does not have significant cervical carotid artery stenosis, and is not in need of a repeat Doppler ultrasound  I will plan for him to return to the office to see us in  4 months time but I would be happy to see him sooner if the need should arise  If he has any symptoms concerning for TIA or stroke including sudden painless loss of vision or double vision, difficulty speaking or swallowing, vertigo / room spinning that does not quickly resolve, or weakness/ numbness affecting 1 side of the face or body he should proceed by ambulance to the nearest emergency room immediately

## 2020-03-17 NOTE — PROGRESS NOTES
Patient ID: Ekta Bejarano is a 68 y o  male  Assessment/Plan:    No problem-specific Assessment & Plan notes found for this encounter  Diagnoses and all orders for this visit:    History of TIA (transient ischemic attack)  -     clopidogrel (PLAVIX) 75 mg tablet; Take 1 tablet (75 mg total) by mouth daily    Mixed hyperlipidemia    Morbid obesity ([address] )    Benign essential hypertension    Type 2 diabetes mellitus without complication, without long-term current use of insulin ([facility])    Excessive daytime sleepiness  -     Home Study; Future    Snores  -     Home Study; Future        Patient Instructions   Transient ischemic attack:  Reedsshiela Rodriguez presents for a hospital follow-up with regard to his recent transient ischemic attack  In early February 2020 presented to the hospital with acute onset of left upper extremity weakness  The weakness was rather profound, significant enough to produce left upper extremity paralysis, but it did thankfully resolved  He reports no residual symptoms  I reviewed his MRI in the office today and there is a tiny area in the right cortical region that might indicate this was a tiny stroke rather than a transient ischemic attack  Either way, will plan to treat him accordingly  He takes his medications as prescribed since his hospitalization  Reports some fatigue but no new stroke-like symptoms  He has some easy bruising but no severe bleeding issues    Notably he does have some left atrial enlargement on his echocardiogram along with some excessive daytime sleepiness which concerns me for the possibility of obstructive sleep apnea which is a risk factors for stroke and also could lead to an increased risk of atrial fibrillation     - for ongoing stroke prevention for the time being he should continue his combination of aspirin, Plavix, Lipitor, and appropriate blood pressure and glycemic control  - we will defer to the good judgment of his primary care team for monitoring of his cholesterol panel and blood sugar numbers  He recently completed his 80 mg atorvastatin prescription and has returned to 40 mg  He notes that he has been more fatigued since his recent hospitalization  If his fatigued resolves with his atorvastatin at 40 mg then he should remain at that dose but if it does not make any appreciable difference I would suggest that he should be at 80 mg of atorvastatin in spite of his good cholesterol profile because 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  - we will plan for the combination of aspirin and Plavix to persist for 3 months from the time of his event  As of May 1st he could discontinue the use of aspirin and remain on Plavix monotherapy  - he should continue to be physically active in as much as he feels capable of doing so  -considering that he does have risk for obstructive Sleep apnea and based on his description of symptoms in the office including excessive daytime sleepiness, snoring, and some gasping at night I will provide him a prescription for a home sleep study  If this does show sleep apnea we will refer him to be seen at the [facility] [address], and at that point we would also plan for cardiac monitoring to ensure that he does not have paroxysmal atrial fibrillation   - he does not have significant cervical carotid artery stenosis, and is not in need of a repeat Doppler ultrasound  I will plan for him to return to the office to see us in  4 months time but I would be happy to see him sooner if the need should arise  If he has any symptoms concerning for TIA or stroke including sudden painless loss of vision or double vision, difficulty speaking or swallowing, vertigo / room spinning that does not quickly resolve, or weakness/ numbness affecting 1 side of the face or body he should proceed by ambulance to the nearest emergency room immediately            Subjective:    ELVIN Waterslima Youngwilliam presents for a follow-up evaluation with regard to his recent cerebrovascular event  He presented to the emergency room after having acute onset of left upper extremity numbness followed by weakness to the point of near paralysis  His symptoms improved in the emergency room to the point that he did not receive IV alteplase  He had his MRI of the brain which was read as negative however based on my review I am concerned that there may be a tiny area of cerebral infarction in the right precentral gyrus  Other than his morbid obesity, his risk factors for actually well controlled at the time of his stroke including his hemoglobin A1c and cholesterol panel  He was already on aspirin and Lipitor at the time  He reports no new events concerning for recurrent TIA or stroke  He takes his medications as prescribed  He has a little bit of easy bruising but no severe bleeding issues  I reviewed his echocardiogram which reveals some left atrial enlargement  His wife confirms that he actually snores at night and she does report some gasping  He also confirms excessive daytime sleepiness  I do have concern for obstructive sleep apnea which would in turn increases risk for stroke, heart attack, and also increase his risk for paroxysmal atrial fibrillation  We will plan for a home sleep study and if he is found to  Have obstructive sleep apnea we will consider long-term cardiac monitoring  Furthermore, he reports that he did have significant craniofacial trauma as a youth and has trouble with draining his sinuses at baseline for which reason he sometimes sleeps in a recliner  He is uncertain of his neck circumference but his wife thinks that she purchases shorts for him in the 18-20 inch neck range      Past Medical History:   Diagnosis Date    Coronary artery disease     Diabetes ([facility] )     Heart attack ([facility] )     Hyperlipidemia     Hypertension     Kidney stone     TIA (transient ischemic attack)        Social History     Socioeconomic History    Marital status: /Civil Union     Spouse name: Not on file    Number of children: Not on file    Years of education: Not on file    Highest education level: Not on file   Occupational History    Not on file   Social Needs    Financial resource strain: Not on file    Food insecurity:     Worry: Not on file     Inability: Not on file    Transportation needs:     Medical: Not on file     Non-medical: Not on file   Tobacco Use    Smoking status: Never Smoker    Smokeless tobacco: Never Used   Substance and Sexual Activity    Alcohol use: Never     Frequency: Monthly or less    Drug use: Never    Sexual activity: Not on file   Lifestyle    Physical activity:     Days per week: Not on file     Minutes per session: Not on file    Stress: Not on file   Relationships    Social connections:     Talks on phone: Not on file     Gets together: Not on file     Attends Zoroastrian service: Not on file     Active member of club or organization: Not on file     Attends meetings of clubs or organizations: Not on file     Relationship status: Not on file    Intimate partner violence:     Fear of current or ex partner: Not on file     Emotionally abused: Not on file     Physically abused: Not on file     Forced sexual activity: Not on file   Other Topics Concern    Not on file   Social History Narrative    No caffeine use       Family History   Problem Relation Age of Onset    Alzheimer's disease Mother     Pulmonary fibrosis Father          Current Outpatient Medications:     amLODIPine (NORVASC) 5 mg tablet, Take 5 mg by mouth daily, Disp: , Rfl:     aspirin 81 mg chewable tablet, Chew 1 tablet (81 mg total) daily for 21 days, Disp: , Rfl: 0    atorvastatin (LIPITOR) 80 mg tablet, Take 1 tablet (80 mg total) by mouth every evening (Patient taking differently: Take 40 mg by mouth every evening ), Disp: 30 tablet, Rfl: 0    cholecalciferol (VITAMIN D3) 1,000 units tablet, Take 1,000 Units by mouth daily, Disp: , Rfl:   Meredith, Zingiber officinalis, (MEREDITH ROOT PO), Take by mouth, Disp: , Rfl:     isosorbide dinitrate (ISORDIL) 30 mg tablet, Take 30 mg by mouth daily, Disp: , Rfl:     metFORMIN (GLUCOPHAGE) 500 mg tablet, Take 500 mg by mouth 2 (two) times a day with meals , Disp: , Rfl:     metoprolol tartrate (LOPRESSOR) 25 mg tablet, Take 25 mg by mouth every 12 (twelve) hours , Disp: , Rfl:     Multiple Vitamins-Minerals (CENTRUM SILVER 50+MEN PO), Take 1 tablet by mouth daily, Disp: , Rfl:     Omega-3 Fatty Acids (FISH OIL) 1200 MG CAPS, Take 1 capsule by mouth 2 (two) times a day, Disp: , Rfl:     Blood pressure 117/70, pulse 63, resp  rate 16, height 6' 3" (1 905 m), weight (!) 148 kg (325 lb 9 6 oz)  The following portions of the patient's history were reviewed: allergies, current medications, past family history, past medical history, past social history and problem list        Objective:    Blood pressure 117/70, pulse 63, resp  rate 16, height 6' 3" (1 905 m), weight (!) 148 kg (325 lb 9 6 oz)  Physical Exam    Neurological Exam      At the time of my evaluation he was awake, alert, and in no distress  There were no clear cranial neuropathies or lateralizing signs  There is no dysarthria or aphasia  There is no clear tremor or ataxia  His gait was stable  He is morbidly obese  ROS:    Review of Systems   Constitutional: Negative  Negative for appetite change and fever  HENT: Negative  Negative for hearing loss, tinnitus, trouble swallowing and voice change  Eyes: Negative  Negative for photophobia and pain  Respiratory: Positive for shortness of breath  Cardiovascular: Negative  Negative for palpitations  Gastrointestinal: Negative  Negative for nausea and vomiting  Endocrine: Negative  Negative for cold intolerance and heat intolerance  Erection difficulties, Loss of sexual drive    Genitourinary: Negative  Negative for dysuria, frequency and urgency  Musculoskeletal: Negative for myalgias and neck pain  Joint pain,    Skin: Negative  Negative for rash  Allergic/Immunologic: Negative  Neurological: Positive for light-headedness  Negative for dizziness, tremors, seizures, syncope, facial asymmetry, speech difficulty, weakness, numbness and headaches  Hematological: Negative  Does not bruise/bleed easily  Psychiatric/Behavioral: Negative  Negative for confusion, hallucinations and sleep disturbance  All other systems reviewed and are negative  Reviewed ROS as entered by medical assistant

## 2020-03-17 NOTE — LETTER
March 17, 2020     Tavo Nina, [address] [facility]  [place] [address]    Patient: Ekta Bejarano   YOB: 1940   Date of Visit: 3/17/2020       Dear Dr Marisuz Gunn Recipients: Thank you for referring Evonne Carlson to me for evaluation  Below are my notes for this consultation  If you have questions, please do not hesitate to call me  I look forward to following your patient along with you           Sincerely,        Addis Turcios MD        CC: Diego Wilkerson MD

## 2020-03-26 DIAGNOSIS — Z86.73 HISTORY OF TIA (TRANSIENT ISCHEMIC ATTACK): ICD-10-CM

## 2020-03-26 NOTE — TELEPHONE ENCOUNTER
Pt states [facility] will not be able to start filling plavix for about 15 days or so (they are reviewing script and notes)  They are recommending a 30 day supply sent to a retail pharmacy until they can take this script over  Pt is asking for 30 day script for plavix to be sent to [facility] in [address] [place] [address]  Please review and sign  Thanks!

## 2020-03-27 RX ORDER — CLOPIDOGREL BISULFATE 75 MG/1
75 TABLET ORAL DAILY
Qty: 30 TABLET | Refills: 0 | Status: SHIPPED | OUTPATIENT
Start: 2020-03-27 | End: 2021-12-15

## 2020-03-28 ENCOUNTER — HOSPITAL ENCOUNTER (OUTPATIENT)
Dept: SLEEP CENTER | Facility: HOSPITAL | Age: 80
Discharge: HOME | End: 2020-03-28
Attending: PSYCHIATRY & NEUROLOGY
Payer: MEDICARE

## 2020-03-28 DIAGNOSIS — G47.19 EXCESSIVE DAYTIME SLEEPINESS: ICD-10-CM

## 2020-03-28 DIAGNOSIS — R06.83 SNORES: ICD-10-CM

## 2020-03-28 PROCEDURE — G0399 HOME SLEEP TEST/TYPE 3 PORTA: HCPCS

## 2020-03-29 NOTE — PROGRESS NOTES
Home Sleep Study Documentation    Pre-Sleep Home Study:    Set-up and instructions performed by: ALLISON Bermeo RRT    Technician performed demonstration for Patient: yes    Return demonstration performed by Patient: yes    Written instructions provided to Patient: yes    Patient signed consent form: yes        Post-Sleep Home Study:    Additional comments by Patient: n/a    Home Sleep Study Failed:no:    Failure reason: N/A    Reported or Detected: N/A    Scored by: ALLISON Bermeo RRT

## 2020-03-30 ENCOUNTER — TELEPHONE (OUTPATIENT)
Dept: SLEEP CENTER | Facility: CLINIC | Age: 80
End: 2020-03-30

## 2020-03-30 NOTE — TELEPHONE ENCOUNTER
----- Message from Kendal Finley MD sent at 3/27/2020  4:11 PM EDT -----  Approved  ----- Message -----  From: Lena Calderón MA  Sent: 3/23/2020  10:31 AM EDT  To: Sleep Medicine [place] Provider    This sleep study needs approval      If approved please sign and return to clerical pool  If denied please include reasons why  Also provide alternative testing if warranted  Please sign and return to clerical pool

## 2020-03-31 ENCOUNTER — TELEPHONE (OUTPATIENT)
Dept: NEUROLOGY | Facility: CLINIC | Age: 80
End: 2020-03-31

## 2020-03-31 DIAGNOSIS — G47.33 OBSTRUCTIVE SLEEP APNEA (ADULT) (PEDIATRIC): Primary | ICD-10-CM

## 2020-03-31 NOTE — TELEPHONE ENCOUNTER
Patient made aware of sleep study results and that it showed mod obstructive sleep apnea  Patient also made aware referral for Sleep was ordered and to have outpatient monitor to check for afib  Patient verbalized understanding  No further questions at this time

## 2020-03-31 NOTE — TELEPHONE ENCOUNTER
----- Message from Richi Oliver MD sent at 3/31/2020  2:03 PM EDT -----  Regarding: LEVY  Please will you give Adam Medrano a call  His home sleep study did in fact show that it appears that he has moderate obstructive sleep apnea  Of course the home study results are not perfect, and it is important that he sees a sleep physician  The sleep doctor will do some counseling with him and talk with him about treatment, and also he will let him know whether not they need an actual in-lab study to confirm the diagnosis with greater certainty (insurance might require that)  I entered the appropriate referral     As I described to him when he was in the office, now that it appears that he has sleep apnea and some changes on his echocardiogram I do think that he will need an outpatient heart monitor to look for atrial fibrillation as the cause for his stroke  We are not doing those monitors right now, but I will send a note over to the scheduling group for once we are able to do that  He should get in to be seen with the Sleep [facility] in the interim (even if it is only virtual visit)    ----- Message -----  From: Jose Vázquez MD  Sent: 3/30/2020   6:43 PM EDT  To: Richi Oliver MD

## 2020-04-02 ENCOUNTER — TELEPHONE (OUTPATIENT)
Dept: SLEEP CENTER | Facility: CLINIC | Age: 80
End: 2020-04-02

## 2020-05-27 ENCOUNTER — TELEPHONE (OUTPATIENT)
Dept: NEUROLOGY | Facility: CLINIC | Age: 80
End: 2020-05-27

## 2020-06-02 ENCOUNTER — TELEPHONE (OUTPATIENT)
Dept: NEUROLOGY | Facility: CLINIC | Age: 80
End: 2020-06-02

## 2020-06-04 ENCOUNTER — OFFICE VISIT (OUTPATIENT)
Dept: SLEEP CENTER | Facility: CLINIC | Age: 80
End: 2020-06-04
Payer: MEDICARE

## 2020-06-04 VITALS
HEART RATE: 63 BPM | OXYGEN SATURATION: 97 % | HEIGHT: 75 IN | BODY MASS INDEX: 39.17 KG/M2 | DIASTOLIC BLOOD PRESSURE: 66 MMHG | SYSTOLIC BLOOD PRESSURE: 124 MMHG | TEMPERATURE: 98.5 F | WEIGHT: 315 LBS

## 2020-06-04 DIAGNOSIS — K21.9 GASTROESOPHAGEAL REFLUX DISEASE, ESOPHAGITIS PRESENCE NOT SPECIFIED: ICD-10-CM

## 2020-06-04 DIAGNOSIS — Z98.61 HISTORY OF PTCA: ICD-10-CM

## 2020-06-04 DIAGNOSIS — I10 BENIGN ESSENTIAL HYPERTENSION: ICD-10-CM

## 2020-06-04 DIAGNOSIS — E66.01 MORBID OBESITY (HCC): ICD-10-CM

## 2020-06-04 DIAGNOSIS — E11.9 TYPE 2 DIABETES MELLITUS WITHOUT COMPLICATION, WITHOUT LONG-TERM CURRENT USE OF INSULIN (HCC): ICD-10-CM

## 2020-06-04 DIAGNOSIS — Z86.73 HISTORY OF TIA (TRANSIENT ISCHEMIC ATTACK): ICD-10-CM

## 2020-06-04 DIAGNOSIS — G47.33 OBSTRUCTIVE SLEEP APNEA (ADULT) (PEDIATRIC): Primary | ICD-10-CM

## 2020-06-04 PROCEDURE — 99204 OFFICE O/P NEW MOD 45 MIN: CPT | Performed by: INTERNAL MEDICINE

## 2020-06-05 ENCOUNTER — TELEPHONE (OUTPATIENT)
Dept: SLEEP CENTER | Facility: CLINIC | Age: 80
End: 2020-06-05

## 2020-06-05 ENCOUNTER — APPOINTMENT (EMERGENCY)
Dept: RADIOLOGY | Facility: HOSPITAL | Age: 80
End: 2020-06-05
Payer: MEDICARE

## 2020-06-05 ENCOUNTER — HOSPITAL ENCOUNTER (EMERGENCY)
Facility: HOSPITAL | Age: 80
Discharge: HOME | End: 2020-06-05
Attending: FAMILY MEDICINE | Admitting: FAMILY MEDICINE
Payer: MEDICARE

## 2020-06-05 VITALS
BODY MASS INDEX: 39.17 KG/M2 | TEMPERATURE: 97.3 F | DIASTOLIC BLOOD PRESSURE: 72 MMHG | OXYGEN SATURATION: 98 % | WEIGHT: 315 LBS | RESPIRATION RATE: 20 BRPM | HEIGHT: 75 IN | HEART RATE: 70 BPM | SYSTOLIC BLOOD PRESSURE: 140 MMHG

## 2020-06-05 DIAGNOSIS — T14.8XXA ABRASION: ICD-10-CM

## 2020-06-05 DIAGNOSIS — L03.90 CELLULITIS: Primary | ICD-10-CM

## 2020-06-05 PROCEDURE — 99283 EMERGENCY DEPT VISIT LOW MDM: CPT

## 2020-06-05 PROCEDURE — 73590 X-RAY EXAM OF LOWER LEG: CPT

## 2020-06-05 PROCEDURE — 99284 EMERGENCY DEPT VISIT MOD MDM: CPT | Performed by: PHYSICIAN ASSISTANT

## 2020-06-05 RX ORDER — CEPHALEXIN 500 MG/1
500 CAPSULE ORAL EVERY 8 HOURS SCHEDULED
Qty: 21 CAPSULE | Refills: 0 | Status: SHIPPED | OUTPATIENT
Start: 2020-06-05 | End: 2020-06-12

## 2020-06-07 ENCOUNTER — HOSPITAL ENCOUNTER (EMERGENCY)
Facility: HOSPITAL | Age: 80
Discharge: HOME | End: 2020-06-07
Attending: FAMILY MEDICINE | Admitting: EMERGENCY MEDICINE
Payer: MEDICARE

## 2020-06-07 VITALS
OXYGEN SATURATION: 100 % | SYSTOLIC BLOOD PRESSURE: 142 MMHG | RESPIRATION RATE: 18 BRPM | WEIGHT: 315 LBS | DIASTOLIC BLOOD PRESSURE: 74 MMHG | HEART RATE: 60 BPM | BODY MASS INDEX: 40.12 KG/M2 | TEMPERATURE: 98 F

## 2020-06-07 DIAGNOSIS — L03.90 CELLULITIS: Primary | ICD-10-CM

## 2020-06-07 LAB
ANION GAP SERPL CALCULATED.3IONS-SCNC: 8 MMOL/L (ref 4–13)
BASOPHILS # BLD AUTO: 0 THOUSANDS/ΜL (ref 0–0.1)
BASOPHILS NFR BLD AUTO: 1 % (ref 0–2)
BUN SERPL-MCNC: 18 MG/DL (ref 7–25)
CALCIUM SERPL-MCNC: 9.3 MG/DL (ref 8.6–10.5)
CHLORIDE SERPL-SCNC: 105 MMOL/L (ref 98–107)
CO2 SERPL-SCNC: 25 MMOL/L (ref 21–31)
CREAT SERPL-MCNC: 1.24 MG/DL (ref 0.7–1.3)
EOSINOPHIL # BLD AUTO: 0.4 THOUSAND/ΜL (ref 0–0.61)
EOSINOPHIL NFR BLD AUTO: 7 % (ref 0–5)
ERYTHROCYTE [DISTWIDTH] IN BLOOD BY AUTOMATED COUNT: 14.5 % (ref 11.5–14.5)
GFR SERPL CREATININE-BSD FRML MDRD: 55 ML/MIN/1.73SQ M
GLUCOSE SERPL-MCNC: 118 MG/DL (ref 65–99)
HCT VFR BLD AUTO: 43.6 % (ref 42–47)
HGB BLD-MCNC: 15.6 G/DL (ref 14–18)
LACTATE SERPL-SCNC: 1.2 MMOL/L (ref 0.5–2)
LYMPHOCYTES # BLD AUTO: 0.8 THOUSANDS/ΜL (ref 0.6–4.47)
LYMPHOCYTES NFR BLD AUTO: 14 % (ref 21–51)
MCH RBC QN AUTO: 32.1 PG (ref 26–34)
MCHC RBC AUTO-ENTMCNC: 35.8 G/DL (ref 31–37)
MCV RBC AUTO: 90 FL (ref 81–99)
MONOCYTES # BLD AUTO: 0.4 THOUSAND/ΜL (ref 0.17–1.22)
MONOCYTES NFR BLD AUTO: 7 % (ref 2–12)
NEUTROPHILS # BLD AUTO: 4.3 THOUSANDS/ΜL (ref 1.4–6.5)
NEUTS SEG NFR BLD AUTO: 72 % (ref 42–75)
PLATELET # BLD AUTO: 160 THOUSANDS/UL (ref 149–390)
PLATELET BLD QL SMEAR: ADEQUATE
PMV BLD AUTO: 8.7 FL (ref 8.6–11.7)
POTASSIUM SERPL-SCNC: 4.1 MMOL/L (ref 3.5–5.5)
RBC # BLD AUTO: 4.87 MILLION/UL (ref 4.3–5.9)
RBC MORPH BLD: NORMAL
SODIUM SERPL-SCNC: 138 MMOL/L (ref 134–143)
WBC # BLD AUTO: 6 THOUSAND/UL (ref 4.8–10.8)

## 2020-06-07 PROCEDURE — 83605 ASSAY OF LACTIC ACID: CPT | Performed by: PHYSICIAN ASSISTANT

## 2020-06-07 PROCEDURE — 87205 SMEAR GRAM STAIN: CPT | Performed by: PHYSICIAN ASSISTANT

## 2020-06-07 PROCEDURE — 85025 COMPLETE CBC W/AUTO DIFF WBC: CPT | Performed by: PHYSICIAN ASSISTANT

## 2020-06-07 PROCEDURE — 96365 THER/PROPH/DIAG IV INF INIT: CPT

## 2020-06-07 PROCEDURE — 80048 BASIC METABOLIC PNL TOTAL CA: CPT | Performed by: PHYSICIAN ASSISTANT

## 2020-06-07 PROCEDURE — 99283 EMERGENCY DEPT VISIT LOW MDM: CPT

## 2020-06-07 PROCEDURE — 87040 BLOOD CULTURE FOR BACTERIA: CPT | Performed by: PHYSICIAN ASSISTANT

## 2020-06-07 PROCEDURE — 36415 COLL VENOUS BLD VENIPUNCTURE: CPT | Performed by: PHYSICIAN ASSISTANT

## 2020-06-07 PROCEDURE — 99284 EMERGENCY DEPT VISIT MOD MDM: CPT | Performed by: PHYSICIAN ASSISTANT

## 2020-06-07 PROCEDURE — 87106 FUNGI IDENTIFICATION YEAST: CPT | Performed by: PHYSICIAN ASSISTANT

## 2020-06-07 PROCEDURE — 87070 CULTURE OTHR SPECIMN AEROBIC: CPT | Performed by: PHYSICIAN ASSISTANT

## 2020-06-07 RX ORDER — CLINDAMYCIN PHOSPHATE 600 MG/50ML
600 INJECTION INTRAVENOUS ONCE
Status: COMPLETED | OUTPATIENT
Start: 2020-06-07 | End: 2020-06-07

## 2020-06-07 RX ORDER — SULFAMETHOXAZOLE AND TRIMETHOPRIM 800; 160 MG/1; MG/1
1 TABLET ORAL 2 TIMES DAILY
Qty: 14 TABLET | Refills: 0 | Status: SHIPPED | OUTPATIENT
Start: 2020-06-07 | End: 2020-06-14

## 2020-06-07 RX ADMIN — CLINDAMYCIN IN 5 PERCENT DEXTROSE 600 MG: 12 INJECTION, SOLUTION INTRAVENOUS at 19:41

## 2020-06-08 ENCOUNTER — TELEPHONE (OUTPATIENT)
Dept: OTOLARYNGOLOGY | Facility: CLINIC | Age: 80
End: 2020-06-08

## 2020-06-11 LAB
BACTERIA WND AEROBE CULT: ABNORMAL
GRAM STN SPEC: ABNORMAL

## 2020-06-13 LAB
BACTERIA BLD CULT: NORMAL
BACTERIA BLD CULT: NORMAL

## 2020-06-18 ENCOUNTER — TELEPHONE (OUTPATIENT)
Dept: NEUROLOGY | Facility: CLINIC | Age: 80
End: 2020-06-18

## 2020-07-17 ENCOUNTER — TELEPHONE (OUTPATIENT)
Dept: CARDIOLOGY CLINIC | Facility: CLINIC | Age: 80
End: 2020-07-17

## 2020-07-17 NOTE — TELEPHONE ENCOUNTER
----- Message from Brittany Hall MD sent at 7/17/2020 11:09 AM EDT -----  Regarding: ILR  Amadou Lee and Seb Baumann and I have talked about this before already    I had a reasonably high suspicion for A-Fib for him previously, and now that he has been diagnosed with sleep apnea that suspicion is higher of course  I do think that he needs the ILR placed  Clinical team, can you please reach out to [facility] to see if he still has questions on this? I am more than happy to answer those but I don't want him to have to wait for Sep to see me    Thanks!      ----- Message -----  From: Miky Fraser PA-C  Sent: 7/15/2020   8:25 AM EDT  To: Brittany Hall MD    [facility] dr Gerald Wheeler,     This is branden with EP  This patient was referred for ILR post TIA but wanted to check with you first  He has an appointment with you in September  If he decides on being scheduled could you forward his information to our procedure  "Diego Valdez" and she will set him up  Thanks!     Margarita [place]

## 2020-07-20 DIAGNOSIS — Z86.73 HISTORY OF TIA (TRANSIENT ISCHEMIC ATTACK): Primary | ICD-10-CM

## 2020-07-20 NOTE — TELEPHONE ENCOUNTER
COVID Pre-Visit Screening     1  Is this a family member screening? Yes  2  Have you traveled outside of your state in the past 2 weeks? No  3  Do you presently have a fever or flu-like symptoms? No  4  Do you have symptoms of an upper respiratory infection like runny nose, sore throat, or cough? No  5  Are you suffering from new headache that you have not had in the past?  No  6  Do you have/have you experienced any new shortness of breath recently? No  7  Do you have any new diarrhea, nausea or vomiting? No  8  Have you been in contact with anyone who has been sick or diagnosed with COVID-19? No  9  Do you have any new loss of taste or smell? No  10  Are you able to wear a mask without a valve for the entire visit? Yes    Patient schedule for loop implant at [facility] on 7/23/20 with Dr Khalif Blanchard  Patient aware of general instructions  Patient cover under medicare

## 2020-07-22 ENCOUNTER — TELEPHONE (OUTPATIENT)
Dept: MEDSURG UNIT | Facility: HOSPITAL | Age: 80
End: 2020-07-22

## 2020-07-23 ENCOUNTER — HOSPITAL ENCOUNTER (OUTPATIENT)
Dept: NON INVASIVE DIAGNOSTICS | Facility: HOSPITAL | Age: 80
Discharge: HOME | End: 2020-07-23
Attending: INTERNAL MEDICINE | Admitting: INTERNAL MEDICINE
Payer: MEDICARE

## 2020-07-23 VITALS
TEMPERATURE: 98.2 F | DIASTOLIC BLOOD PRESSURE: 73 MMHG | RESPIRATION RATE: 18 BRPM | OXYGEN SATURATION: 96 % | SYSTOLIC BLOOD PRESSURE: 141 MMHG | HEART RATE: 63 BPM

## 2020-07-23 DIAGNOSIS — Z86.73 HX-TIA (TRANSIENT ISCHEMIC ATTACK): ICD-10-CM

## 2020-07-23 PROCEDURE — NC001 PR NO CHARGE: Performed by: INTERNAL MEDICINE

## 2020-07-23 PROCEDURE — 33285 INSJ SUBQ CAR RHYTHM MNTR: CPT

## 2020-07-23 PROCEDURE — C1764 EVENT RECORDER, CARDIAC: HCPCS

## 2020-07-23 PROCEDURE — 33285 INSJ SUBQ CAR RHYTHM MNTR: CPT | Performed by: INTERNAL MEDICINE

## 2020-07-23 RX ORDER — LIDOCAINE HYDROCHLORIDE 10 MG/ML
INJECTION, SOLUTION EPIDURAL; INFILTRATION; INTRACAUDAL; PERINEURAL CODE/TRAUMA/SEDATION MEDICATION
Status: COMPLETED | OUTPATIENT
Start: 2020-07-23 | End: 2020-07-23

## 2020-07-23 RX ADMIN — LIDOCAINE HYDROCHLORIDE 15 ML: 10 INJECTION, SOLUTION EPIDURAL; INFILTRATION; INTRACAUDAL; PERINEURAL at 15:12

## 2020-07-23 NOTE — DISCHARGE INSTRUCTIONS
Do not use lotions/powders/creams on incision  Remove outer bandage 72 hours after procedure - if present, leave underlying steri-strips in place, they will either fall off on their own or will be removed at 2 week follow up appointment  Please keep incision dry for the first first week - either keeping incision out of direct shower water or placing over the counter waterproof bandages over top when showering  Please call the office [phone] if you notice redness, swelling, bleeding, or drainage from incision or if you develop fevers  Cardiac Loop Recorder Insertion      WHAT YOU SHOULD KNOW:    A cardiac loop recorder is a device used to diagnose heart rhythm problems, such as a fast or irregular heartbeat  It is implanted in your left chest, just under the skin  The device records a pattern of your heart's rhythm, called an EKG  Your device records automatic EKGs, depending on how your caregiver programs it  You may also receive a handheld controller  You press a button on the controller when you have symptoms, such as dizziness, lightheadedness, or palpitations  The device will record an EKG at that moment  The recording can help your caregiver see if your symptoms may be caused by heart rhythm problems  Your caregiver will remove the device after it has collected enough data  You may need the device for up to 3 years  The procedure to remove the device is similar to the procedure used to implant it       AFTER YOU LEAVE:    Follow up with your cardiologist as directed: You will need to return in 1 to 2 weeks  Your cardiologist will check your incision  He may also program your device settings again  He will retrieve data from the device every 1 to 3 months with a monitor held over your skin  You may be able to transmit data from your device from home as well  You will do this by calling a number provided by your cardiologist, or as they have instructed you  Ask for information about this process   Write down your questions so you remember to ask them during your visits       Wound care: Keep loop recorder incision dry for one week  Do not use lotions/powders/creams on incision  Remove outer bandage 48 hours after procedure - leave underlying steri-strips in place, they will either fall off on their own or will be removed at 2 week follow up appointment  Please call the office if you notice redness, swelling, bleeding, or drainage from incision or if you develop fevers  After that first week, carefully wash your incision with soap and water  Keep the area clean and dry until it heals       Return to activity: If you received anesthesia, you will not be able to drive for 24 hours  Otherwise, most people can return to normal activities soon after the procedure  Your cardiologist may want to know if your work involves electrical current or high-voltage equipment  Ask about other electrical items that could interfere with your cardiac loop recorder       Contact your cardiologist if:   · You have a fever or chills  · Your wound is red, swollen, or draining pus  · You have questions or concerns about your condition or care  Seek care immediately or call 911 if:   · You feel weak, dizzy, or faint  · You lose consciousness  © 2014 [address] [employer] is for End User's use only and may not be sold, redistributed or otherwise used for commercial purposes  All illustrations and images included in CareNotes® are the copyrighted property of [employer]  or David France  The above information is an  only  It is not intended as medical advice for individual conditions or treatments  Talk to your doctor, nurse or pharmacist before following any medical regimen to see if it is safe and effective for you

## 2020-07-23 NOTE — H&P
H&P Exam - Electrophysiology  Yamini Acevedo 68 y o  male MRN: [id number]  Unit/Bed#: -01 Encounter: [id number]    Assessment/Plan     Assessment:  1  Prior TIA  2  Hyperlipidemia  3  Essential hypertension  4  Non insulin-dependent diabetes mellitus  5  Obesity with BMI 40  6  Now confirmed LEVY    Plan:  1  Patient presents today to undergo loop recorder implantation  History of Present Illness   HPI:  Yamini Acevedo is a 68y o  year old male with prior TIA, hyperlipidemia, essential hypertension, non-insulin-dependent diabetes mellitus, obesity with BMI of 40, and LEVY  He was diagnosed with TIA in February of this year and was seen by Neurology in follow-up as well  Patient does have risk factors for atrial fibrillation and so neurology device the plan in which he would first be tested for sleep apnea, and if he did have this, again other risk factor for atrial fibrillation, he would also undergo loop recorder implantation to rule out this arrhythmia  He was seen by sleep medicine last month who did diagnose him with sleep apnea and he therefore presents today to undergo loop recorder implantation  His wife is had 2 loop recorder placed as she was also diagnosed with a stroke 6 years ago and is being monitored for atrial fibrillation as well  He denies any chest pain, lightheadedness, dizziness, or shortness of breath  EKG:     Review of Systems  ROS as noted above, otherwise 12 point review of systems was performed and is negative       Historical Information   Past Medical History:   Diagnosis Date    Coronary artery disease     [facility]     [facility]     Hyperlipidemia     Hypertension     Kidney stone     TIA (transient ischemic attack)      Past Surgical History:   Procedure Laterality Date    CARDIAC SURGERY      CORONARY ANGIOPLASTY WITH STENT PLACEMENT      REPLACEMENT TOTAL KNEE BILATERAL      SINUS SURGERY      TONSILLECTOMY       Family History: Family History   Problem Relation Age of Onset    Alzheimer's disease Mother     Pulmonary fibrosis Father        Social History   Social History     Substance and Sexual Activity   Alcohol Use Never    Frequency: Monthly or less     Social History     Substance and Sexual Activity   Drug Use Never     Social History     Tobacco Use   Smoking Status Never Smoker   Smokeless Tobacco Never Used       Meds/Allergies   all medications and allergies reviewed  Home Medications:   Medications Prior to Admission   Medication    amLODIPine (NORVASC) 5 mg tablet    aspirin 81 mg chewable tablet    atorvastatin (LIPITOR) 80 mg tablet    cholecalciferol (VITAMIN D3) 1,000 units tablet    clopidogrel (PLAVIX) 75 mg tablet    Meredith, Zingiber officinalis, (MEREDITH ROOT PO)    isosorbide dinitrate (ISORDIL) 30 mg tablet    metFORMIN (GLUCOPHAGE) 500 mg tablet    metoprolol tartrate (LOPRESSOR) 25 mg tablet    Multiple Vitamins-Minerals (CENTRUM SILVER 50+MEN PO)    Omega-3 Fatty Acids (FISH OIL) 1200 MG CAPS       Allergies   Allergen Reactions    Lisinopril Anaphylaxis     Anaphylaxis    Bacitracin Rash       Objective   Vitals: Blood pressure 141/73, pulse 63, temperature 98 2 °F (36 8 °C), temperature source Oral, resp  rate 18, SpO2 96 %    Orthostatic Blood Pressures      Most Recent Value   Blood Pressure  141/73 filed at 07/23/2020 1426   Patient Position - Orthostatic VS  Sitting filed at 07/23/2020 1426          No intake or output data in the 24 hours ending 07/23/20 1450    Invasive Devices     None                 Physical Exam   GEN: NAD, alert and oriented, well appearing  SKIN: dry without significant lesions or rashes  HEENT: NCAT, PERRL, EOMs intact  NECK: No JVD or carotid bruits appreciated  CARDIOVASCULAR: RRR, normal S1, S2 without murmurs, rubs, or gallops appreciated  LUNGS: Clear to auscultation bilaterally without wheezes, rhonchi, or rales  ABDOMEN: Soft, nontender, nondistended  EXTREMITIES/VASCULAR: perfused without clubbing, cyanosis, or edema b/l  PSYCH: Normal mood and affect  NEURO: CN ll-Xll grossly intact    Lab Results: I have personally reviewed pertinent lab results  Invalid input(s): LABGLOM              Imaging: I have personally reviewed pertinent reports  Results for orders placed during the hospital encounter of 20   Echo complete with contrast if indicated    Narrative [facility] [address]  [address]    Transthoracic Echocardiogram  2D, M-mode, Doppler, and Color Doppler    Study date:  2020    Patient: Ifrah Sher  MR number: [id number]  Account number: [de-identified]  : 1940  Age: 70 years  Gender: Male  Status: Inpatient  [facility]   Height: 74 in  Weight: 320 3 lb  BP: 141/ 63 mmHg    Indications: TIA  Diagnoses: G45 9 - Transient cerebral ischemic attack, unspecified    Sonographer:  Brian Zuniga  Referring Physician:  Dominga Akbar MD  Group:  [facility]  Interpreting Physician:  Gerardo Segal MD    SUMMARY    PROCEDURE INFORMATION:  This was a technically difficult study  LEFT VENTRICLE:  Systolic function was at the lower limits of normal  Ejection fraction was estimated to be 50 %  There were no regional wall motion abnormalities  There was mild concentric hypertrophy  LEFT ATRIUM:  The atrium was mildly dilated  MITRAL VALVE:  There was mild regurgitation  AORTIC VALVE:  There was mild stenosis  There was trace regurgitation  Valve mean gradient was 13 mmHg  TRICUSPID VALVE:  There was trace regurgitation  HISTORY: Consistent with transient ischemic attack or stroke  PRIOR HISTORY: Previous (remote) myocardial infarction  Risk factors: hypertension, diabetes, hypercholesterolemia, and morbid obesity  PRIOR PROCEDURES: Stent      PROCEDURE: The study was performed in the [facility]  This was a routine study  The transthoracic approach was used  The study included complete 2D imaging, M-mode, complete spectral Doppler, and color Doppler  The  heart rate was 65 bpm, at the start of the study  Images were obtained from the parasternal, apical, subcostal, and suprasternal notch acoustic windows  Intravenous contrast ( 1 0ml of definity in NSS) was administered to opacify the left  ventricle  Echocardiographic views were limited due to poor acoustic window availability, decreased penetration, and lung interference  This was a technically difficult study  LEFT VENTRICLE: Size was normal  Systolic function was at the lower limits of normal  Ejection fraction was estimated to be 50 %  There were no regional wall motion abnormalities  Wall thickness was mildly increased  There was mild  concentric hypertrophy  DOPPLER: Features were consistent with a pseudonormal left ventricular filling pattern, with concomitant abnormal relaxation and increased filling pressure (grade 2 diastolic dysfunction)  RIGHT VENTRICLE: The size was normal  Systolic function was normal  Wall thickness was normal     LEFT ATRIUM: The atrium was mildly dilated  RIGHT ATRIUM: Size was normal     MITRAL VALVE: Valve structure was normal  There was normal leaflet separation  DOPPLER: The transmitral velocity was within the normal range  There was no evidence for stenosis  There was mild regurgitation  AORTIC VALVE: The valve was probably trileaflet  Leaflets exhibited mildly increased thickness, mild calcification, and mildly reduced cuspal separation  DOPPLER: Transaortic velocity was increased due to valvular stenosis  There was mild  stenosis  There was trace regurgitation  TRICUSPID VALVE: The valve structure was normal  There was normal leaflet separation  DOPPLER: The transtricuspid velocity was within the normal range  There was no evidence for stenosis   There was trace regurgitation  PULMONIC VALVE: Leaflets exhibited normal thickness, no calcification, and normal cuspal separation  DOPPLER: The transpulmonic velocity was within the normal range  There was no significant regurgitation  PERICARDIUM: There was no pericardial effusion  The pericardium was normal in appearance  AORTA: The root exhibited normal size  The ascending aorta was normal in size  SYSTEMIC VEINS: IVC: The inferior vena cava was not well visualized  MEASUREMENT TABLES    DOPPLER MEASUREMENTS  LVOT   (Reference normals)  Peak carlos   95 cm/s   (--)  Mean carlos   64 cm/s   (--)  VTI   23 cm   (--)  Peak gradient   4 mmHg   (--)  Mean gradient   2 mmHg   (--)  Aortic valve   (Reference normals)  Peak carlos   229 cm/s   (--)  Mean carlos   160 cm/s   (--)  VTI   58 cm   (--)  Peak gradient   21 mmHg   (--)  Mean gradient   13 mmHg   (--)  Obstr index, VTI   0 4    (--)  Obstr index, Vmax   0 41    (--)  Obstr index, Vmean   0 4    (--)    SYSTEM MEASUREMENT TABLES    2D  %FS: 21 32 %  AV Diam: 3 26 cm  Ao asc: 3 63 cm  EDV(Teich): 137 36 ml  EF(Teich): 42 89 %  ESV(Teich): 78 44 ml  IVSd: 1 25 cm  LA Area: 25 16 cm2  LA Diam: 4 15 cm  LVEDV MOD A4C: 203 41 ml  LVEF MOD A4C: 44 35 %  LVESV MOD A4C: 113 19 ml  LVIDd: 5 33 cm  LVIDs: 4 2 cm  LVLd A4C: 9 38 cm  LVLs A4C: 8 25 cm  LVOT Diam: 2 17 cm  LVPWd: 1 34 cm  RA Area: 17 7 cm2  RV Diam : 3 67 cm  SI(Teich): 22 15 ml/m2  SV MOD A4C: 90 22 ml  SV(Cube): 77 84 ml  SV(Teich): 58 92 ml    CW  AV Env  Ti: 351 2 ms  AV VTI: 58 16 cm  AV Vmax: 2 34 m/s  AV Vmean: 1 66 m/s  AV maxP 87 mmHg  AV meanP 99 mmHg  MR VTI: 210 42 cm  MR Vmax: 5 47 m/s  MR Vmean: 4 01 m/s  MR maxP 67 mmHg  MR meanP 96 mmHg  TR Vmax: 2 51 m/s  TR maxP 1 mmHg    MM  TAPSE: 2 73 cm    PW  BAUTISTA (VTI): 1 52 cm2  BAUTISTA Vmax: 1 51 cm2  E': 0 07 m/s  E/E': 20 17  LVOT Env  Ti: 362 29 ms  LVOT VTI: 23 99 cm  LVOT Vmax: 0 96 m/s  LVOT Vmean: 0 66 m/s  LVOT maxPG: 3 66 mmHg  LVOT meanP 08 mmHg  MV A Ethan: 0 43 m/s  MV Dec Blanco: 5 78 m/s2  MV DecT: 228 17 ms  MV E Ethan: 1 32 m/s  MV E/A Ratio: 3 06    [employer] Accredited Echocardiography Laboratory    Prepared and electronically signed by    Aysha Zaidi MD  Signed 2020 12:50:52         Code Status: Prior    ** Please Note: Dictation voice to text software may have been used in the creation of this document   **

## 2020-08-02 NOTE — OP NOTE
Placement of cardiac event recorder    History and physical were reviewed  Patient was examined and history was reviewed  No change in patient's condition Since history and physical has been completed        The pre- operative diagnosis: Cryptogenic CVA assess for occult atrial fibrillation      Postoperative diagnosis: same        Procedure: placement of cardiac event recorder          Surgeon: An Cosby DO    Assistants -none    Specimens - none    Estimated blood loss- none    Findings-none    Complications none    Anesthesia-  local lidocaine by myself      Details of the device [employer] Reveal Linq          Description of procedure: The patient was seen before the procedure  The details of the procedure was explained and patient agreed to the same  Appropriate consent was signed  The patient was brought to the electrophysiologic laboratory  Proper time out was done  Sterile dressing and draping was done  Local lidocaine was infiltrated, in the left fourth intercostal space, about 2 cm lateral to the sternal edge  Using the stab knife an incision was made  Thereafter the  was used, moved around to form a pocket, along the long axis of the heart, in the fourth intercostal space region  Then plunger was used to deploy the device  The plunger was disengaged and removed  The  was pulled back  Pressure was held and hemostasis was obtained  Pocket closed with interrupted 4-0 ethilon    Dressing was done Telfa and Tegaderm      Summary of the procedure: The patient came in to the laboratory for cardiac event recorder placement   The device has been placed and patient tolerated the procedure well

## 2020-08-06 ENCOUNTER — IN-CLINIC DEVICE VISIT (OUTPATIENT)
Dept: CARDIOLOGY CLINIC | Facility: CLINIC | Age: 80
End: 2020-08-06
Payer: MEDICARE

## 2020-08-06 DIAGNOSIS — Z95.818 PRESENCE OF CARDIAC DEVICE: Primary | ICD-10-CM

## 2020-08-06 PROCEDURE — 93291 INTERROG DEV EVAL SCRMS IP: CPT | Performed by: INTERNAL MEDICINE

## 2020-08-06 NOTE — PROGRESS NOTES
Results for orders placed or performed in visit on 08/06/20   Cardiac EP device report    Narrative    [facility] [place]/ [address] [place] [address]  INTERROGATED IN THE [place] OFFICE  BATTERY VOLTAGE ADEQUATE  NO PATIENT OR DEVICE ACTIVATED EPISODES   WOUND CHECK: INCISION CLEAN AND DRY WITH EDGES APPROXIMATED; SUTURES REMOVED; WOUND CARE AND RESTRICTIONS REVIEWED WITH PATIENT ----RIGO

## 2020-09-19 ENCOUNTER — HOSPITAL ENCOUNTER (EMERGENCY)
Facility: HOSPITAL | Age: 80
Discharge: HOME | End: 2020-09-19
Attending: EMERGENCY MEDICINE | Admitting: EMERGENCY MEDICINE
Payer: MEDICARE

## 2020-09-19 VITALS
OXYGEN SATURATION: 97 % | HEART RATE: 70 BPM | RESPIRATION RATE: 16 BRPM | BODY MASS INDEX: 40.37 KG/M2 | WEIGHT: 315 LBS | SYSTOLIC BLOOD PRESSURE: 146 MMHG | TEMPERATURE: 97.5 F | DIASTOLIC BLOOD PRESSURE: 74 MMHG

## 2020-09-19 DIAGNOSIS — L03.116 CELLULITIS OF LEFT LOWER EXTREMITY: Primary | ICD-10-CM

## 2020-09-19 PROCEDURE — 99283 EMERGENCY DEPT VISIT LOW MDM: CPT

## 2020-09-19 PROCEDURE — 99284 EMERGENCY DEPT VISIT MOD MDM: CPT | Performed by: EMERGENCY MEDICINE

## 2020-09-19 RX ORDER — SULFAMETHOXAZOLE AND TRIMETHOPRIM 800; 160 MG/1; MG/1
1 TABLET ORAL ONCE
Status: COMPLETED | OUTPATIENT
Start: 2020-09-19 | End: 2020-09-19

## 2020-09-19 RX ORDER — SULFAMETHOXAZOLE AND TRIMETHOPRIM 800; 160 MG/1; MG/1
1 TABLET ORAL 2 TIMES DAILY
Qty: 20 TABLET | Refills: 0 | Status: SHIPPED | OUTPATIENT
Start: 2020-09-19 | End: 2020-09-29

## 2020-09-19 RX ADMIN — SULFAMETHOXAZOLE AND TRIMETHOPRIM 1 TABLET: 800; 160 TABLET ORAL at 20:12

## 2020-09-19 NOTE — ED PROVIDER NOTES
History  Chief Complaint   Patient presents with    Cellulitis     HPI  PVD, seen for cellulitis in June: got Keflex, questionable improvement, got Bactrim, and did better  Now notes an area in the back of the left leg with some edema that he says is the early stage of a cellulitis  Not yet warm, but thinks it would be by the morning  Numerous ED visits for similar  No fever/chills, no systemic symptoms  States he's been admitted and got IV abx, but I don't see any in my perusal of EMR  I do see prior failures of Doxy and Keflex  Prior to Admission Medications   Prescriptions Last Dose Informant Patient Reported? Taking?    Meredith, Zingiber officinalis, (MEREDITH ROOT PO)  Self Yes Yes   Sig: Take by mouth   Multiple Vitamins-Minerals (CENTRUM SILVER 50+MEN PO)  Self Yes Yes   Sig: Take 1 tablet by mouth daily   Omega-3 Fatty Acids (FISH OIL) 1200 MG CAPS  Self Yes Yes   Sig: Take 1 capsule by mouth 2 (two) times a day   amLODIPine (NORVASC) 5 mg tablet  Self Yes Yes   Sig: Take 5 mg by mouth daily   aspirin 81 mg chewable tablet  Self No Yes   Sig: Chew 1 tablet (81 mg total) daily for 21 days   atorvastatin (LIPITOR) 80 mg tablet  Self No Yes   Sig: Take 1 tablet (80 mg total) by mouth every evening   Patient taking differently: Take 40 mg by mouth every evening    cholecalciferol (VITAMIN D3) 1,000 units tablet  Self Yes Yes   Sig: Take 1,000 Units by mouth daily   clopidogrel (PLAVIX) 75 mg tablet   No Yes   Sig: Take 1 tablet (75 mg total) by mouth daily   isosorbide dinitrate (ISORDIL) 30 mg tablet  Self Yes Yes   Sig: Take 30 mg by mouth daily   metFORMIN (GLUCOPHAGE) 500 mg tablet  Self Yes Yes   Sig: Take 500 mg by mouth 2 (two) times a day with meals    metoprolol tartrate (LOPRESSOR) 25 mg tablet  Self Yes Yes   Sig: Take 25 mg by mouth every 12 (twelve) hours       Facility-Administered Medications: None       Past Medical History:   Diagnosis Date    Coronary artery disease     [facility]     Heart attack ([facility] )     Hyperlipidemia     Hypertension     Kidney stone     TIA (transient ischemic attack)        Past Surgical History:   Procedure Laterality Date    CARDIAC SURGERY      CORONARY ANGIOPLASTY WITH STENT PLACEMENT      REPLACEMENT TOTAL KNEE BILATERAL      SINUS SURGERY      TONSILLECTOMY         Family History   Problem Relation Age of Onset    Alzheimer's disease Mother     Pulmonary fibrosis Father      I have reviewed and agree with the history as documented  E-Cigarette/Vaping    E-Cigarette Use Never User      E-Cigarette/Vaping Substances    Nicotine No     THC No     CBD No     Flavoring No     Other No     Unknown No      Social History     Tobacco Use    Smoking status: Never Smoker    Smokeless tobacco: Never Used   Substance Use Topics    Alcohol use: Never     Frequency: Monthly or less    Drug use: Never       Review of Systems   Constitutional: Negative for chills and fever  Respiratory: Negative for shortness of breath  Cardiovascular: Negative for chest pain  Gastrointestinal: Negative for abdominal pain  Psychiatric/Behavioral: Negative for agitation and behavioral problems  Physical Exam  Physical Exam  Constitutional:       Appearance: He is well-developed  HENT:      Head: Normocephalic and atraumatic  Cardiovascular:      Rate and Rhythm: Normal rate and regular rhythm  Pulmonary:      Effort: Pulmonary effort is normal    Abdominal:      General: There is no distension  Skin:     Comments: Vascular insufficiency rash (hemosiderosis) BLE  Posterior left leg with edema, tenderness, some raised rash, but not warm  No more firm than the RLE     Psychiatric:         Behavior: Behavior normal          Vital Signs  ED Triage Vitals [09/19/20 1945]   Temperature Pulse Respirations Blood Pressure SpO2   97 5 °F (36 4 °C) 71 16 148/71 97 %      Temp src Heart Rate Source Patient Position - Orthostatic VS BP Location FiO2 (%) -- -- -- -- --      Pain Score       No Pain           Vitals:    09/19/20 1945   BP: 148/71   Pulse: 71         Visual Acuity      ED Medications  Medications   sulfamethoxazole-trimethoprim (BACTRIM DS) 800-160 mg per tablet 1 tablet (has no administration in time range)       Diagnostic Studies  Results Reviewed     None                 No orders to display              Procedures  Procedures         ED Course                           SBIRT 22yo+      Most Recent Value   SBIRT (22 yo +)   In order to provide better care to our patients, we are screening all of our patients for alcohol and drug use  Would it be okay to ask you these screening questions? Yes Filed at: 09/19/2020 2001   Initial Alcohol Screen: US AUDIT-C    1  How often do you have a drink containing alcohol? 1 Filed at: 09/19/2020 2001   2  How many drinks containing alcohol do you have on a typical day you are drinking? 1 Filed at: 09/19/2020 2001   3a  Male UNDER 65: How often do you have five or more drinks on one occasion? 0 Filed at: 09/19/2020 2001   3b  FEMALE Any Age, or MALE 65+: How often do you have 4 or more drinks on one occassion? 0 Filed at: 09/19/2020 2001   Audit-C Score  2 Filed at: 09/19/2020 2001   SHILPA: How many times in the past year have you    Used an illegal drug or used a prescription medication for non-medical reasons? Never Filed at: 09/19/2020 2001                  [facility]  Number of Diagnoses or Management Options  Diagnosis management comments: Not very convincing exam for cellulitis, and certainly vascular insufficiency is contributory, but several prior visits for same and seemed to do better with Bactrim; will do so again  F/U with PCP, wound clinic referral may be useful?       Disposition  Final diagnoses:   Cellulitis of left lower extremity     Time reflects when diagnosis was documented in both MDM as applicable and the Disposition within this note     Time User Action Codes Description Comment 9/19/2020  8:02 PM Riana Newell Palauan Add [[id number]] Cellulitis of left lower extremity       ED Disposition     ED Disposition Condition Date/Time Comment    Discharge Stable Sat Sep 19, 2020  8:02 PM Le Roy Elpidio discharge to home/self care  Follow-up Information     Follow up With Specialties Details Why Contact Info    Jigar Suarez MD Internal Medicine Schedule an appointment as soon as possible for a visit in 3 days  [phone]  [phone]            Patient's Medications   Discharge Prescriptions    SULFAMETHOXAZOLE-TRIMETHOPRIM (BACTRIM DS) 800-160 MG PER TABLET    Take 1 tablet by mouth 2 (two) times a day for 10 days smx-tmp DS (BACTRIM) 800-160 mg tabs (1tab q12 D10)       Start Date: 9/19/2020 End Date: 9/29/2020       Order Dose: 1 tablet       Quantity: 20 tablet    Refills: 0     No discharge procedures on file      PDMP Review       Value Time User    PDMP Reviewed  Yes 2/20/2020  3:32 PM Kayden Bowling PA-C          ED Provider  Electronically Signed by           Sal Orosco MD  09/19/20 2004

## 2020-09-25 ENCOUNTER — OFFICE VISIT (OUTPATIENT)
Dept: NEUROLOGY | Facility: CLINIC | Age: 80
End: 2020-09-25
Payer: MEDICARE

## 2020-09-25 VITALS
SYSTOLIC BLOOD PRESSURE: 132 MMHG | HEART RATE: 56 BPM | DIASTOLIC BLOOD PRESSURE: 63 MMHG | HEIGHT: 75 IN | BODY MASS INDEX: 27.48 KG/M2 | TEMPERATURE: 98 F | WEIGHT: 221 LBS

## 2020-09-25 DIAGNOSIS — E78.2 MIXED HYPERLIPIDEMIA: Chronic | ICD-10-CM

## 2020-09-25 DIAGNOSIS — E11.9 TYPE 2 DIABETES MELLITUS WITHOUT COMPLICATION, WITHOUT LONG-TERM CURRENT USE OF INSULIN (HCC): Chronic | ICD-10-CM

## 2020-09-25 DIAGNOSIS — I10 BENIGN ESSENTIAL HYPERTENSION: Chronic | ICD-10-CM

## 2020-09-25 DIAGNOSIS — G47.33 OBSTRUCTIVE SLEEP APNEA (ADULT) (PEDIATRIC): ICD-10-CM

## 2020-09-25 DIAGNOSIS — Z86.73 HISTORY OF TIA (TRANSIENT ISCHEMIC ATTACK): Primary | ICD-10-CM

## 2020-09-25 DIAGNOSIS — I65.22 LEFT CAROTID STENOSIS: ICD-10-CM

## 2020-09-25 PROCEDURE — 99214 OFFICE O/P EST MOD 30 MIN: CPT | Performed by: PSYCHIATRY & NEUROLOGY

## 2020-09-25 NOTE — PATIENT INSTRUCTIONS
History of TIA: Danni Conde presents for follow-up evaluation with regard to his prior stroke versus TIA  He reports no new symptoms concerning for recurrent TIA or stroke  He takes his medication as prescribed  He did not endorse any bleeding or bruising issues  He is still on the combination of aspirin and Plavix  He attempted to use CPAP but had a pretty rough experience with it that 1st night and has felt that he does not want to continue with that for the time being  He did follow through and get his implantable loop recorder placed, and he is receiving appropriate cardiac monitoring     - for stroke prevention he should continue his current combination of Plavix, Lipitor, and appropriate blood pressure and glycemic control  - I will defer to the good judgment of his primary care team for monitoring of his cholesterol panel and blood sugar numbers  - I would like him to check his blood pressure approximately once per week and ensure that he frequently sees numbers less than 130/80  If he often has numbers above that range than I would like him to follow up with his primary care team about modulating his blood pressure  - we will continue to monitor his heart for any signs of atrial fibrillation  If AFib is detected we would likely discontinue Plavix and transition him to a different type of blood thinner     -I would like him to remain physically active in as much as he feels capable of doing so  - in the future if he feels ready to do so it may be reasonable to attempt using CPAP with a different mask type, using it during the day to get accustomed to it, and finding other ways to we have it into his routine to make it less unpleasant  We will plan to review that at the time of his return to the office     - at the time of his stroke was found that he does have some atherosclerotic disease in the left internal carotid artery  This was not severe but it does bear monitoring    We will plan to check a Doppler ultrasound at this point   - from a neurologic standpoint he is not in need of ongoing dual anti-platelet therapy  He should discontinue the use of aspirin at this time and remain on Plavix only  From my standpoint he is overall doing very well indeed  I will plan for him to return to the office in 6 months time but I would be happy to see him sooner if the need should arise  If he has any symptoms concerning for TIA or stroke including sudden painless loss of vision or double vision, difficulty speaking or swallowing, vertigo/ room spinning that does not quickly resolve, or weakness / numbness affecting 1 side of the face or body he should proceed by ambulance to the nearest emergency room immediately  He is scheduled to have a colonoscopy on October 8, 2020  From a purely neurologic standpoint he would be cleared to hold his Plavix as directed and have a colonoscopy performed on schedule  Hypotension should be avoided with his anesthesia if possible

## 2020-09-25 NOTE — PROGRESS NOTES
Patient ID: Shay Aguero is a 68 y o  male  Assessment/Plan:    Patient Instructions     History of TIA: Altagracia Zhou presents for follow-up evaluation with regard to his prior stroke versus TIA  He reports no new symptoms concerning for recurrent TIA or stroke  He takes his medication as prescribed  He did not endorse any bleeding or bruising issues  He is still on the combination of aspirin and Plavix  He attempted to use CPAP but had a pretty rough experience with it that 1st night and has felt that he does not want to continue with that for the time being  He did follow through and get his implantable loop recorder placed, and he is receiving appropriate cardiac monitoring     - for stroke prevention he should continue his current combination of Plavix, Lipitor, and appropriate blood pressure and glycemic control  - I will defer to the good judgment of his primary care team for monitoring of his cholesterol panel and blood sugar numbers  - I would like him to check his blood pressure approximately once per week and ensure that he frequently sees numbers less than 130/80  If he often has numbers above that range than I would like him to follow up with his primary care team about modulating his blood pressure  - we will continue to monitor his heart for any signs of atrial fibrillation  If AFib is detected we would likely discontinue Plavix and transition him to a different type of blood thinner     -I would like him to remain physically active in as much as he feels capable of doing so  - in the future if he feels ready to do so it may be reasonable to attempt using CPAP with a different mask type, using it during the day to get accustomed to it, and finding other ways to we have it into his routine to make it less unpleasant    We will plan to review that at the time of his return to the office     - at the time of his stroke was found that he does have some atherosclerotic disease in the left internal carotid artery  This was not severe but it does bear monitoring  We will plan to check a Doppler ultrasound at this point   - from a neurologic standpoint he is not in need of ongoing dual anti-platelet therapy  He should discontinue the use of aspirin at this time and remain on Plavix only  From my standpoint he is overall doing very well indeed  I will plan for him to return to the office in 6 months time but I would be happy to see him sooner if the need should arise  If he has any symptoms concerning for TIA or stroke including sudden painless loss of vision or double vision, difficulty speaking or swallowing, vertigo/ room spinning that does not quickly resolve, or weakness / numbness affecting 1 side of the face or body he should proceed by ambulance to the nearest emergency room immediately  He is scheduled to have a colonoscopy on October 8, 2020  From a purely neurologic standpoint he would be cleared to hold his Plavix as directed and have a colonoscopy performed on schedule  Hypotension should be avoided with his anesthesia if possible  No problem-specific Assessment & Plan notes found for this encounter  Diagnoses and all orders for this visit:    History of TIA (transient ischemic attack)    Type 2 diabetes mellitus without complication, without long-term current use of insulin (HCC)    Benign essential hypertension    Obstructive sleep apnea (adult) (pediatric)    Mixed hyperlipidemia    Left carotid stenosis  -     VAS carotid complete study; Future           Subjective:    ELVIN Araya Nereida presents for follow-up evaluation with regard to his prior transient ischemic attack  He reports no new events concerning for recurrent TIA or stroke  He takes his medication as prescribed  He did not endorse any bleeding or bruising issues    He confirms that he attempted to use a CPAP machine however he found it to be prohibitively uncomfortable and precluded him from sleeping for approximately 90 minutes  Subsequently he is not really willing to make a 2nd attempt at this point in time but will continue to bear in mind that there are additional options with regard to mask style and even the possibility of nerve stimulators etc     He has implantable loop recorder in place at this time and we are monitoring for any atrial fibrillation  If atrial fibrillation is detected it would clearly changes management with regard to stroke prevention  He does have left carotid stenosis at the time of his most recent cerebrovascular imaging and is due to have that re-evaluated with Doppler ultrasound  I will provide him without prescription          Past Medical History:   Diagnosis Date    Coronary artery disease     [facility]     [facility]     Hyperlipidemia     Hypertension     Kidney stone     TIA (transient ischemic attack)        Past Surgical History:   Procedure Laterality Date    CARDIAC SURGERY      CORONARY ANGIOPLASTY WITH STENT PLACEMENT      REPLACEMENT TOTAL KNEE BILATERAL      SINUS SURGERY      TONSILLECTOMY         Social History     Socioeconomic History    Marital status: /Civil Union     Spouse name: None    Number of children: None    Years of education: None    Highest education level: None   Occupational History    None   Social Needs    Financial resource strain: None    Food insecurity     Worry: None     Inability: None    Transportation needs     Medical: None     Non-medical: None   Tobacco Use    Smoking status: Never Smoker    Smokeless tobacco: Never Used   Substance and Sexual Activity    Alcohol use: Never     Frequency: Monthly or less    Drug use: Never    Sexual activity: None   Lifestyle    Physical activity     Days per week: None     Minutes per session: None    Stress: None   Relationships    Social connections     Talks on phone: None     Gets together: None     Attends [employer] service: None     Active member of club or organization: None     Attends meetings of clubs or organizations: None     Relationship status: None    Intimate partner violence     Fear of current or ex partner: None     Emotionally abused: None     Physically abused: None     Forced sexual activity: None   Other Topics Concern    None   Social History Narrative    No caffeine use       Family History   Problem Relation Age of Onset    Alzheimer's disease Mother     Pulmonary fibrosis Father          Current Outpatient Medications:     amLODIPine (NORVASC) 5 mg tablet, Take 5 mg by mouth daily, Disp: , Rfl:     aspirin 81 mg chewable tablet, Chew 1 tablet (81 mg total) daily for 21 days, Disp: , Rfl: 0    atorvastatin (LIPITOR) 80 mg tablet, Take 1 tablet (80 mg total) by mouth every evening (Patient taking differently: Take 40 mg by mouth every evening ), Disp: 30 tablet, Rfl: 0    cholecalciferol (VITAMIN D3) 1,000 units tablet, Take 1,000 Units by mouth daily, Disp: , Rfl:     clopidogrel (PLAVIX) 75 mg tablet, Take 1 tablet (75 mg total) by mouth daily, Disp: 30 tablet, Rfl: 0    Meredith, Zingiber officinalis, (MEREDITH ROOT PO), Take by mouth, Disp: , Rfl:     isosorbide dinitrate (ISORDIL) 30 mg tablet, Take 30 mg by mouth daily, Disp: , Rfl:     metFORMIN (GLUCOPHAGE) 500 mg tablet, Take 500 mg by mouth 2 (two) times a day with meals , Disp: , Rfl:     metoprolol tartrate (LOPRESSOR) 25 mg tablet, Take 25 mg by mouth every 12 (twelve) hours , Disp: , Rfl:     Multiple Vitamins-Minerals (CENTRUM SILVER 50+MEN PO), Take 1 tablet by mouth daily, Disp: , Rfl:     Omega-3 Fatty Acids (FISH OIL) 1200 MG CAPS, Take 1 capsule by mouth 2 (two) times a day, Disp: , Rfl:     sulfamethoxazole-trimethoprim (BACTRIM DS) 800-160 mg per tablet, Take 1 tablet by mouth 2 (two) times a day for 10 days smx-tmp DS (BACTRIM) 800-160 mg tabs (1tab q12 D10) (Patient not taking: Reported on 9/25/2020), Disp: 20 tablet, Rfl: 0    Allergies Allergen Reactions    Lisinopril Anaphylaxis     Anaphylaxis    Bacitracin Rash        Blood pressure 132/63, pulse 56, temperature 98 °F (36 7 °C), height 6' 3" (1 905 m), weight 100 kg (221 lb)  The following portions of the patient's history were reviewed: allergies, current medications, past family history, past medical history, past surgical history, past social history and problem list        Objective:    Blood pressure 132/63, pulse 56, temperature 98 °F (36 7 °C), height 6' 3" (1 905 m), weight 100 kg (221 lb)  Physical Exam    Neurological Exam    At the time of my examination he was awake, alert, and in no distress  There was no dysarthria or aphasia  He is a good historian  There are no clear new cranial neuropathies or lateralizing signs  His gait was stable  ROS:    Review of Systems   Constitutional: Negative  Negative for appetite change and fever  HENT: Negative  Negative for hearing loss, tinnitus, trouble swallowing and voice change  Eyes: Negative  Negative for photophobia and pain  Respiratory: Negative  Negative for shortness of breath  Cardiovascular: Negative  Negative for palpitations  Gastrointestinal: Negative  Negative for nausea and vomiting  Endocrine: Negative  Negative for cold intolerance  Genitourinary: Negative  Negative for dysuria, frequency and urgency  Musculoskeletal: Negative for myalgias and neck pain  Balance Issues     Skin: Negative  Negative for rash  Allergic/Immunologic: Negative  Neurological: Negative  Negative for dizziness, tremors, seizures, syncope, facial asymmetry, speech difficulty, weakness, light-headedness, numbness and headaches  Hematological: Negative  Does not bruise/bleed easily  Psychiatric/Behavioral: Negative  Negative for confusion, hallucinations and sleep disturbance  All other systems reviewed and are negative  Reviewed ROS as entered by medical assistant        I have spent 31 minutes with Patient and family today in which greater than 50% of this time was spent in counseling/coordination of care regarding Risks and benefits of tx options, Intructions for management, Patient and family education, Importance of tx compliance, Risk factor reductions and Impressions

## 2020-10-28 ENCOUNTER — HOSPITAL ENCOUNTER (OUTPATIENT)
Dept: NON INVASIVE DIAGNOSTICS | Facility: HOSPITAL | Age: 80
Discharge: HOME | End: 2020-10-28
Attending: PSYCHIATRY & NEUROLOGY
Payer: MEDICARE

## 2020-10-28 DIAGNOSIS — I65.22 LEFT CAROTID STENOSIS: ICD-10-CM

## 2020-10-28 PROCEDURE — 93880 EXTRACRANIAL BILAT STUDY: CPT

## 2020-10-28 PROCEDURE — 93880 EXTRACRANIAL BILAT STUDY: CPT | Performed by: SURGERY

## 2021-01-18 ENCOUNTER — IMMUNIZATIONS (OUTPATIENT)
Dept: FAMILY MEDICINE CLINIC | Facility: HOSPITAL | Age: 81
End: 2021-01-18

## 2021-01-18 DIAGNOSIS — Z23 ENCOUNTER FOR IMMUNIZATION: Primary | ICD-10-CM

## 2021-01-18 PROCEDURE — 0011A SARS-COV-2 / COVID-19 MRNA VACCINE (MODERNA) 100 MCG: CPT

## 2021-01-18 PROCEDURE — 91301 SARS-COV-2 / COVID-19 MRNA VACCINE (MODERNA) 100 MCG: CPT

## 2021-02-15 ENCOUNTER — IMMUNIZATIONS (OUTPATIENT)
Dept: FAMILY MEDICINE CLINIC | Facility: HOSPITAL | Age: 81
End: 2021-02-15

## 2021-02-15 DIAGNOSIS — Z23 ENCOUNTER FOR IMMUNIZATION: Primary | ICD-10-CM

## 2021-02-15 PROCEDURE — 91301 SARS-COV-2 / COVID-19 MRNA VACCINE (MODERNA) 100 MCG: CPT

## 2021-02-15 PROCEDURE — 0012A SARS-COV-2 / COVID-19 MRNA VACCINE (MODERNA) 100 MCG: CPT

## 2021-02-16 DIAGNOSIS — F40.240 CLAUSTROPHOBIA: Primary | ICD-10-CM

## 2021-02-16 RX ORDER — DIAZEPAM 5 MG/1
5 TABLET ORAL
Qty: 2 TABLET | Refills: 0 | Status: SHIPPED | OUTPATIENT
Start: 2021-02-16 | End: 2022-02-08 | Stop reason: SDUPTHER

## 2021-02-16 NOTE — TELEPHONE ENCOUNTER
Patient called nurse line because he is going for an MRI brain and wanted to know if he could have script called into his pharmacy for a medication that he can take pre-procedure  He reports that he has taken ativan in the past pre-MRI  Advised him, will send message to [facility] and call him back  He was appreciative

## 2021-02-16 NOTE — TELEPHONE ENCOUNTER
Called patient and informed him that medication was sent to his pharmacy on file which I confirmed with him over the phone  He was appreciative

## 2021-03-08 ENCOUNTER — TELEPHONE (OUTPATIENT)
Dept: NEUROSURGERY | Facility: CLINIC | Age: 81
End: 2021-03-08

## 2021-03-08 NOTE — TELEPHONE ENCOUNTER
Patient called nurseline questioning if he can get an MRI with a loop recorder  Called MRI who stated the medtronic reveal linq is compatible with MRI  Returned call to patient to make him aware he is still able to get his MRI  Patient appreciative of call back

## 2021-03-09 ENCOUNTER — HOSPITAL ENCOUNTER (OUTPATIENT)
Dept: MRI IMAGING | Facility: HOSPITAL | Age: 81
Discharge: HOME | End: 2021-03-09
Payer: MEDICARE

## 2021-03-09 DIAGNOSIS — D33.3 VESTIBULAR SCHWANNOMA (HCC): ICD-10-CM

## 2021-03-09 PROCEDURE — A9585 GADOBUTROL INJECTION: HCPCS | Performed by: PHYSICIAN ASSISTANT

## 2021-03-09 PROCEDURE — 70553 MRI BRAIN STEM W/O & W/DYE: CPT

## 2021-03-09 RX ADMIN — GADOBUTROL 14 ML: 604.72 INJECTION INTRAVENOUS at 18:07

## 2021-03-15 ENCOUNTER — OFFICE VISIT (OUTPATIENT)
Dept: NEUROSURGERY | Facility: CLINIC | Age: 81
End: 2021-03-15
Payer: MEDICARE

## 2021-03-15 VITALS
HEIGHT: 75 IN | SYSTOLIC BLOOD PRESSURE: 140 MMHG | DIASTOLIC BLOOD PRESSURE: 90 MMHG | BODY MASS INDEX: 39.17 KG/M2 | WEIGHT: 315 LBS | TEMPERATURE: 97.1 F

## 2021-03-15 DIAGNOSIS — D33.3 VESTIBULAR SCHWANNOMA (HCC): Primary | ICD-10-CM

## 2021-03-15 PROCEDURE — 99214 OFFICE O/P EST MOD 30 MIN: CPT | Performed by: PHYSICIAN ASSISTANT

## 2021-03-15 RX ORDER — RIBOFLAVIN (VITAMIN B2) 100 MG
100 TABLET ORAL DAILY
COMMUNITY

## 2021-03-15 NOTE — ASSESSMENT & PLAN NOTE
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  - also worked in [employer] for many years resulting in chronic hearing loss bilaterally    Imaging:  · MRI brain IAC 3/9/2021: interval increase in size of left sided vestibular schwannoma  Mild progressive mass effect on the adjacent middle cerebellar peduncle/cindy  Plan:  · MRI imaging reviewed in detail with the patient and his wife   · Patient was previously told that surgery would not improve his hearing and likely worsen hearing  · SRS was also previously discussed with the risk that it may also cause more harm than good  · Dr Elayne Puckett was out of the office at this time for emergency surgery  · Discussed with the patient that I will review imaging in a non-urgent time frame and call patient back to discuss further follow up plan  · Patient and wife in agreement with plan

## 2021-03-15 NOTE — PATIENT INSTRUCTIONS
Acoustic Neuroma   AMBULATORY CARE:   An acoustic neuroma (AN)  is a slow growing, benign (not cancer) tumor  The tumor grows on the nerves that control balance, hearing, and feeling in your face  AN also grows on the nerve that moves muscles used for chewing  Rarely, it grows large enough and blocks fluid from going to your brain  Normally, it only affects one ear  Common symptoms include the following:   · Partial or total hearing loss in one ear     · Numbness, weakness, or twitching on one side of your face     · Ringing in your ear     · Dizziness, poor balance, or trouble walking    · Full feeling or pain in your ear    · Headaches or trouble chewing    Contact your healthcare provider if:   · Your symptoms get worse  · You begin to develop new onset facial weakness or worsening facial pain, increased falls with worsening gait instability  · Your symptoms return after treatment  · You have questions or concerns about your condition or care  Treatment for AN  depends on how severe your symptoms are and the size of the tumor  Treatment may also depend on your age  You may not need treatment if the tumor is small  Your healthcare provider may want to monitor your AN with regular MRI scans  You may need surgery to remove all or part of your AN  You may, instead, need radiation treatment to shrink your AN  Follow up with your healthcare provider as directed:  Write down your questions so you remember to ask them during your visits  © Copyright [address] Information is for End User's use only and may not be sold, redistributed or otherwise used for commercial purposes  All illustrations and images included in CareNotes® are the copyrighted property of [employer] D [employer] M [employer]  or [facility] Bridgette Holden   The above information is an  only  It is not intended as medical advice for individual conditions or treatments   Talk to your doctor, nurse or pharmacist before following any medical regimen to see if it is safe and effective for you

## 2021-03-15 NOTE — PROGRESS NOTES
ADDENDUM: Called patient to discuss ongoing options of radiation vs continued follow up  Patient states that right now he is not interested in pursuing radiation treatment  Patient would like to continue with surveillance at this time  Will follow up in 1 year with Mri brain IAC protocol  Instructed patient to call closer to appointment to have prescription filled for Xanax  Also discussed with patient whether he would ever want this treated if it continues to show growth, and he states that if he develops new symptoms or imaging shows enlargement that he would be interesting in pursuing additional options for intervention  Neurosurgery Office Note  Deana Jackson 80 y o  male MRN: [id number]      Assessment/Plan     Vestibular schwannoma [facility])  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  - also worked in [employer] for many years resulting in chronic hearing loss bilaterally    Imaging:  · MRI brain IAC 3/9/2021: interval increase in size of left sided vestibular schwannoma  Mild progressive mass effect on the adjacent middle cerebellar peduncle/cindy  Plan:  · MRI imaging reviewed in detail with the patient and his wife   · Patient was previously told that surgery would not improve his hearing and likely worsen hearing  · SRS was also previously discussed with the risk that it may also cause more harm than good  · Dr Sathish Carballo was out of the office at this time for emergency surgery  · Discussed with the patient that I will review imaging in a non-urgent time frame and call patient back to discuss further follow up plan  · Patient and wife in agreement with plan  There are no diagnoses linked to this encounter          CHIEF COMPLAINT    Chief Complaint   Patient presents with    Follow-up Vestibular schwannoma       HISTORY    History of Present Illness     80y o  year old male     Pasquale Chanel is a 68 y o  male with a PMH significant for MI x3, TIA x2, HTN, HLD, CAD, DM2 who is here today to review MRI brain IAC for a presumed vestibular schwannoma    On 2/5/20, he developed abnormal sensation in his left hand which progressed to full left aup and MRI brain without contrast showed an incidental finding of a left CP angle mass  He does have chronic hearing loss secondary to working in the [employer] for years, but does state that his left ear is much worse than the right  In the last year patient has had progression to near full hearing loss in his left ear  He also admits to vertigo when he ambulates but denies syncopal episodes or falls  His treatment options were discussed in detail at patients last office visit, including surgical resection, SRS, and observation  It was recommend at that time to continue with observation as resection or SRS will not improve his hearing or vertigo  Today he has been doing well  No acute issues recently  He has some progression of his hearing loss on the left, but other than that no changes  Patient had a previous ear infection which cause tenderness swelling and some transient facial pain  This was treated with amoxicillin with complete resolution of his symptoms  He denies any consistent facial pains, tinnitus, drainage, facial weakness, headaches  See Discussion    REVIEW OF SYSTEMS    Review of Systems   Constitutional: Negative  HENT: Positive for ear pain (Pain left ear, 1 month ago ) and hearing loss (Left ear)  Pain left side cheek bone 1 month ago  Eyes: Negative  Respiratory: Negative  Cardiovascular: Negative  Gastrointestinal: Negative  Endocrine: Negative  Genitourinary: Negative  Musculoskeletal: Positive for gait problem (balance issue)  Skin: Negative  Allergic/Immunologic: Negative  Hematological: Negative  Psychiatric/Behavioral: Negative  Meds/Allergies     Current Outpatient Medications   Medication Sig Dispense Refill    amLODIPine (NORVASC) 5 mg tablet Take 5 mg by mouth daily      Ascorbic Acid (vitamin C) 100 MG tablet Take 100 mg by mouth daily      atorvastatin (LIPITOR) 80 mg tablet Take 1 tablet (80 mg total) by mouth every evening (Patient taking differently: Take 40 mg by mouth every evening ) 30 tablet 0    cholecalciferol (VITAMIN D3) 1,000 units tablet Take 1,000 Units by mouth daily      clopidogrel (PLAVIX) 75 mg tablet Take 1 tablet (75 mg total) by mouth daily 30 tablet 0    Meredith, Zingiber officinalis, (MEREDITH ROOT PO) Take by mouth      isosorbide dinitrate (ISORDIL) 30 mg tablet Take 30 mg by mouth daily      metFORMIN (GLUCOPHAGE) 500 mg tablet Take 500 mg by mouth 2 (two) times a day with meals       metoprolol tartrate (LOPRESSOR) 25 mg tablet Take 25 mg by mouth every 12 (twelve) hours       Multiple Vitamins-Minerals (CENTRUM SILVER 50+MEN PO) Take 1 tablet by mouth daily      Omega-3 Fatty Acids (FISH OIL) 1200 MG CAPS Take 1 capsule by mouth 2 (two) times a day      Zinc Sulfate (ZINC 15 PO) Take by mouth      diazepam (VALIUM) 5 mg tablet Take 1 tablet (5 mg total) by mouth 60 minutes pre-procedure May repeat x1 30 mins prior if needed  (Patient not taking: Reported on 3/15/2021) 2 tablet 0     No current facility-administered medications for this visit          Allergies   Allergen Reactions    Lisinopril Anaphylaxis     Anaphylaxis    Bacitracin Rash       PAST HISTORY    Past Medical History:   Diagnosis Date    Coronary artery disease     [facility] [address] [facility]     [facility]     Hyperlipidemia     Hypertension     Kidney stone     TIA (transient ischemic attack)        Past Surgical History:   Procedure Laterality Date    CARDIAC SURGERY      CORONARY ANGIOPLASTY WITH STENT PLACEMENT      REPLACEMENT TOTAL KNEE BILATERAL  SINUS SURGERY      TONSILLECTOMY         Social History     Tobacco Use    Smoking status: Never Smoker    Smokeless tobacco: Never Used   Substance Use Topics    Alcohol use: Never     Frequency: Monthly or less    Drug use: Never       Family History   Problem Relation Age of Onset    Alzheimer's disease Mother     Pulmonary fibrosis Father          Above history personally reviewed  EXAM    Vitals:Blood pressure 140/90, temperature (!) 97 1 °F (36 2 °C), temperature source Temporal, height 6' 3" (1 905 m), weight (!) 147 kg (325 lb)  ,Body mass index is 40 62 kg/m²  Physical Exam  Constitutional:       Appearance: Normal appearance  He is well-developed  HENT:      Head: Normocephalic and atraumatic  Eyes:      Extraocular Movements: Extraocular movements intact and EOM normal       Conjunctiva/sclera: Conjunctivae normal       Pupils: Pupils are equal, round, and reactive to light  Neck:      Musculoskeletal: Normal range of motion and neck supple  Vascular: No JVD  Cardiovascular:      Rate and Rhythm: Normal rate  Pulmonary:      Effort: Pulmonary effort is normal    Abdominal:      General: There is no distension  Palpations: Abdomen is soft  Tenderness: There is no abdominal tenderness  Musculoskeletal: Normal range of motion  General: No tenderness or deformity  Skin:     General: Skin is warm and dry  Neurological:      Mental Status: He is alert and oriented to person, place, and time  Cranial Nerves: Cranial nerve deficit present  Sensory: No sensory deficit  Motor: No weakness  Gait: Gait is intact  Deep Tendon Reflexes: Reflexes are normal and symmetric  Psychiatric:         Speech: Speech normal          Behavior: Behavior normal          Thought Content: Thought content normal          Neurologic Exam     Mental Status   Oriented to person, place, and time     Attention: normal    Speech: speech is normal   Level of consciousness: alert  Knowledge: good  Normal comprehension  Cranial Nerves     CN III, IV, VI   Pupils are equal, round, and reactive to light  Extraocular motions are normal    Right pupil: Size: 2 mm  Shape: regular  Left pupil: Size: 2 mm  Shape: regular  Upgaze: normal  Downgaze: normal    CN V   Facial sensation intact  CN VII   Facial expression full, symmetric  CN VIII   CN VIII normal    Hearing: impaired (decreased hearing on the left  )    CN XII   CN XII normal    Tongue deviation: none    Motor Exam   Muscle bulk: normal  Right arm tone: normal  Left arm tone: normal  Right leg tone: normal  Left leg tone: normal    Strength   Right deltoid: 5/5  Left deltoid: 5/5  Right biceps: 5/5  Left biceps: 5/5  Right triceps: 5/5  Left triceps: 5/5  Right wrist flexion: 5/5  Left wrist flexion: 5/5  Right wrist extension: 5/5  Left wrist extension: 5/5  Right iliopsoas: 5/5  Left iliopsoas: 5/5  Right quadriceps: 5/5  Left quadriceps: 5/5  Right hamstrin/5  Left hamstrin/5  Right anterior tibial: 5/5  Left anterior tibial: 5/5  Right gastroc: 5/5  Left gastroc: 5/5    Sensory Exam   Light touch normal      Gait, Coordination, and Reflexes     Gait  Gait: normal    Tremor   Resting tremor: absent  Action tremor: absent    Reflexes   Right Gonzalez: absent  Left Gonzalez: absent  Right ankle clonus: absent  Left ankle clonus: absent        MEDICAL DECISION MAKING    Imaging Studies:     Mri Brain Iac Wo And W Contrast    Result Date: 3/12/2021  Narrative: MRI BRAIN AND IAC'S -  WITH AND WITHOUT CONTRAST INDICATION: D33 3: Benign neoplasm of cranial nerves  COMPARISON:  3/6/2020 TECHNIQUE: Brain:   Sagittal T1, axial T2, axial [place], axial T1, axial FLAIR, axial diffusion imaging  Axial T1 postcontrast   Axial BRAVO post contrast  IAC'S:  Coronal FIESTA, coronal T1 postcontrast, axial T1 postcontrast with fat suppression   Targeted images of the IAC'S were performed requiring additional time at acquisition and interpretation of approximately 25% IV Contrast:  14 mL of Gadobutrol injection (SINGLE-DOSE) IMAGE QUALITY:   Diagnostic  FINDINGS: BRAIN PARENCHYMA:  There is no evidence of recent infarction  There is no midline shift  There is no acute intracranial hemorrhage  There is mild chronic microvascular ischemic change  IAC'S:  There has been interval increase in size of the left sided vestibular schwannoma most notably involving the cisternal component  The lesion measures 2 2 x 2 1 cm on series 10, image 7 and previously measured 1 9 x 1 8 cm on the prior examination  when measured in the same plane  The cisternal component of the lesion measures approximately 2 2 x 1 5 cm and previously measured 1 8 x 1 3 cm  There is also mild increased mass effect on the left middle cerebellar peduncle and lateral cindy  There is no significant associated edema  There is preservation of an approximately 5 mm CSF fundal cap  Small foci of susceptibility are noted within the lesion consistent with microhemorrhage  VENTRICLES:  Normal  SELLA AND PITUITARY GLAND:  Normal  ORBITS:  Normal  PARANASAL SINUSES:  Normal  VASCULATURE:  Evaluation of the major intracranial vasculature demonstrates appropriate flow voids  CALVARIUM AND SKULL BASE:  Normal  EXTRACRANIAL SOFT TISSUES:  Normal     Impression: Interval increase in size of the left-sided vestibular schwannoma, as described above with mild progressive mass effect on the adjacent middle cerebellar peduncle /cindy  Workstation performed: [id number]      I have personally reviewed pertinent reports     and I have personally reviewed pertinent films in PACS

## 2021-04-07 ENCOUNTER — OFFICE VISIT (OUTPATIENT)
Dept: NEUROLOGY | Facility: CLINIC | Age: 81
End: 2021-04-07
Payer: MEDICARE

## 2021-04-07 VITALS
HEIGHT: 75 IN | DIASTOLIC BLOOD PRESSURE: 72 MMHG | WEIGHT: 315 LBS | SYSTOLIC BLOOD PRESSURE: 135 MMHG | HEART RATE: 57 BPM | BODY MASS INDEX: 39.17 KG/M2

## 2021-04-07 DIAGNOSIS — E78.2 MIXED HYPERLIPIDEMIA: Chronic | ICD-10-CM

## 2021-04-07 DIAGNOSIS — E11.9 TYPE 2 DIABETES MELLITUS WITHOUT COMPLICATION, WITHOUT LONG-TERM CURRENT USE OF INSULIN (HCC): Chronic | ICD-10-CM

## 2021-04-07 DIAGNOSIS — G47.33 OBSTRUCTIVE SLEEP APNEA (ADULT) (PEDIATRIC): ICD-10-CM

## 2021-04-07 DIAGNOSIS — I65.22 LEFT CAROTID STENOSIS: ICD-10-CM

## 2021-04-07 DIAGNOSIS — I10 BENIGN ESSENTIAL HYPERTENSION: Chronic | ICD-10-CM

## 2021-04-07 DIAGNOSIS — D33.3 VESTIBULAR SCHWANNOMA (HCC): ICD-10-CM

## 2021-04-07 DIAGNOSIS — Z86.73 HISTORY OF TIA (TRANSIENT ISCHEMIC ATTACK): Primary | ICD-10-CM

## 2021-04-07 PROCEDURE — 99214 OFFICE O/P EST MOD 30 MIN: CPT | Performed by: PSYCHIATRY & NEUROLOGY

## 2021-04-07 NOTE — PROGRESS NOTES
Patient ID: Julianne Castleman is a 80 y o  male  Assessment/Plan:   Patient Instructions   Transient ischemic attack:  Alexandru Grigsby presents for follow-up evaluation with regard to his prior left-sided transient ischemic attack and his known carotid artery stenosis  He reports no new stroke-like symptoms  He takes his medication as prescribed  He did have 1 recent fall that resulted in some bruising around his left hand side, but otherwise he did not endorse any significant bleeding or bruising issues  - for ongoing stroke prevention he should continue his current combination of Plavix, statin, and appropriate blood pressure and glycemic control   -he has done an outstanding job of keeping a blood pressure log and he should continue to check his blood pressure on a regular basis   - he will be due for a repeat carotid Doppler ultrasound in 6 months and I will provide him with that prescription   -I would like him to remain physically active in as much as he feels capable of doing so  - he attempted previously to tolerate a CPAP but was unable to do so  In the future we may come to a point would be reasonable to consider a different type of mask for him if he continues to have symptoms of obstructive sleep apnea  - he has a loop recorder in place however I do not see device checks in the cardiology notes  I would like for him to call the cardiology group after he leaves our office today to ensure that they are receiving the transmission from his machine and evaluating his heart rhythms  Dizziness: He reports that he experiences intermittent episodes of dizziness/being off balance which happen when he goes from sitting to standing  It does not typically occur when he goes from laying to sitting and in the office today he has evidence of mild vestibular dysfunction with no cerebellar dysfunction  Ultimately his description is most consistent with orthostasis  He reports that he drinks fluids all the time  -I would like him to keep track of how much fluid he is drinking in a day for just a couple of days to ensure that he is really getting enough  We would suggest targeting 36-48 oz of non caffeinated beverages per day  - it may be reasonable, as long as he is monitoring his blood pressure, to add a little bit of salt back into his diet to see if that improves his symptoms however if he does not notice a clear improvement he should cut it back out     - in the future we could consider compression stockings if necessary  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 and has a minimally positive head impulse test particularly with head turn to the left  We will continue to defer to the good judgment of the Neurosurgery team with regard to the possibility of radiation treatment versus watchful waiting  From my standpoint he is doing quite well indeed  I will plan for him to come back to the office in 8-12 months but I would be happy to see him sooner if the need should arise  If he has any stroke-like symptoms such as sudden painless loss of vision or double vision, difficulty speaking or swallowing, vertigo/ room spinning that does not quickly resolve, or weakness / numbness affecting 1 side of the face or body he should proceed by ambulance to the nearest emergency room immediately  No problem-specific Assessment & Plan notes found for this encounter  Diagnoses and all orders for this visit:    History of TIA (transient ischemic attack)    Type 2 diabetes mellitus without complication, without long-term current use of insulin (HCC)    Obstructive sleep apnea (adult) (pediatric)    Left carotid stenosis  -     VAS carotid complete study; Future    Benign essential hypertension    Mixed hyperlipidemia    Vestibular schwannoma (HCC)           Subjective:    ELVIN Grigsby presents for a follow-up evaluation with regard to his prior TIA    He reports no new symptoms concerning for TIA or stroke  He takes his medications as prescribed  He did not endorse any significant bleeding or bruising issues although he did have a fall recently and had some bruising around his left hand side  We reviewed together the results of his most recent carotid Doppler which were quite good  His most recent lab work is in the desired range  We reviewed some recent symptoms he has been having, particularly when he goes from a seated position to standing he experiences the onset of a loss of balance  This sounds most consistent with orthostasis  He denies any vertiginous symptoms  The symptoms do not occur when he goes from laying to sitting  He reports that they are short lived and do not seem overly severe  He also has ongoing hearing loss on the left hand side  He is working with the Neurosurgery team with regard to consideration of whether not he should receive radiation therapy for his vestibular schwannoma              Past Medical History:   Diagnosis Date    Coronary artery disease     [facility] [address] [facility]     [facility] attack [facility]     Hyperlipidemia     Hypertension     Kidney stone     TIA (transient ischemic attack)        Past Surgical History:   Procedure Laterality Date    CARDIAC SURGERY      CORONARY ANGIOPLASTY WITH STENT PLACEMENT      REPLACEMENT TOTAL KNEE BILATERAL      SINUS SURGERY      TONSILLECTOMY         Social History     Socioeconomic History    Marital status: /Civil Union     Spouse name: Not on file    Number of children: Not on file    Years of education: Not on file    Highest education level: Not on file   Occupational History    Not on file   Social Needs    Financial resource strain: Not on file    Food insecurity     Worry: Not on file     Inability: Not on file    Transportation needs     Medical: Not on file     Non-medical: Not on file   Tobacco Use    Smoking status: Never Smoker    Smokeless tobacco: Never Used Substance and Sexual Activity    Alcohol use: Never     Frequency: Monthly or less    Drug use: Never    Sexual activity: Not on file   Lifestyle    Physical activity     Days per week: Not on file     Minutes per session: Not on file    Stress: Not on file   Relationships    Social connections     Talks on phone: Not on file     Gets together: Not on file     Attends Hinduism service: Not on file     Active member of club or organization: Not on file     Attends meetings of clubs or organizations: Not on file     Relationship status: Not on file    Intimate partner violence     Fear of current or ex partner: Not on file     Emotionally abused: Not on file     Physically abused: Not on file     Forced sexual activity: Not on file   Other Topics Concern    Not on file   Social History Narrative    No caffeine use       Family History   Problem Relation Age of Onset    Alzheimer's disease Mother     Pulmonary fibrosis Father          Current Outpatient Medications:     amLODIPine (NORVASC) 5 mg tablet, Take 5 mg by mouth daily, Disp: , Rfl:     Ascorbic Acid (vitamin C) 100 MG tablet, Take 100 mg by mouth daily, Disp: , Rfl:     atorvastatin (LIPITOR) 80 mg tablet, Take 1 tablet (80 mg total) by mouth every evening (Patient taking differently: Take 40 mg by mouth every evening ), Disp: 30 tablet, Rfl: 0    cholecalciferol (VITAMIN D3) 1,000 units tablet, Take 1,000 Units by mouth daily, Disp: , Rfl:     clopidogrel (PLAVIX) 75 mg tablet, Take 1 tablet (75 mg total) by mouth daily, Disp: 30 tablet, Rfl: 0    Meredith, Zingiber officinalis, (MEREDITH ROOT PO), Take by mouth, Disp: , Rfl:     isosorbide dinitrate (ISORDIL) 30 mg tablet, Take 30 mg by mouth daily, Disp: , Rfl:     metFORMIN (GLUCOPHAGE) 500 mg tablet, Take 500 mg by mouth 2 (two) times a day with meals , Disp: , Rfl:     metoprolol tartrate (LOPRESSOR) 25 mg tablet, Take 25 mg by mouth every 12 (twelve) hours , Disp: , Rfl:     Multiple Vitamins-Minerals (CENTRUM SILVER 50+MEN PO), Take 1 tablet by mouth daily, Disp: , Rfl:     Omega-3 Fatty Acids (FISH OIL) 1200 MG CAPS, Take 1 capsule by mouth 2 (two) times a day, Disp: , Rfl:     Zinc Sulfate (ZINC 15 PO), Take by mouth, Disp: , Rfl:     diazepam (VALIUM) 5 mg tablet, Take 1 tablet (5 mg total) by mouth 60 minutes pre-procedure May repeat x1 30 mins prior if needed  (Patient not taking: Reported on 3/15/2021), Disp: 2 tablet, Rfl: 0    Allergies   Allergen Reactions    Lisinopril Anaphylaxis     Anaphylaxis    Bacitracin Rash              Objective:    /72 (BP Location: Left arm, Patient Position: Sitting, Cuff Size: Standard)   Pulse 57   Ht 6' 3" (1 905 m)   Wt (!) 145 kg (320 lb 9 6 oz)   BMI 40 07 kg/m²       Physical Exam    Neurological Exam    At the time of my examination he was awake, alert, and in no distress  He is a good historian  There was no dysarthria or aphasia  Extraocular movements were intact with no nystagmus  Head impulse test showed a corrective saccade with head turn to the left  There was no clear skew deviation  Romberg sign was absent  Rapid alternating movements were impaired in the left arm compared to the right  He had intact/normal Rinne test on the right but on the left he had difficulty with both bone and air conduction  He did not have any vertigo with head impulse testing  ROS:    Review of Systems   Constitutional: Positive for fatigue (From walking)  Negative for appetite change and fever  HENT: Positive for hearing loss  Negative for tinnitus, trouble swallowing and voice change  Eyes: Negative  Negative for photophobia and pain  Respiratory: Negative  Negative for shortness of breath  Cardiovascular: Negative  Negative for palpitations  Gastrointestinal: Negative  Negative for nausea and vomiting  Endocrine: Negative  Negative for cold intolerance  Genitourinary: Negative    Negative for dysuria, frequency and urgency  Musculoskeletal: Negative  Negative for myalgias and neck pain  Skin: Negative  Negative for rash  Neurological: Positive for weakness (Legs)  Negative for dizziness, tremors, seizures, syncope, facial asymmetry, speech difficulty, light-headedness, numbness and headaches  Balance issues   Hematological: Bruises/bleeds easily (Bruises easily)  Psychiatric/Behavioral: Positive for confusion  Negative for hallucinations and sleep disturbance  Reviewed ROS as entered by medical assistant

## 2021-04-07 NOTE — LETTER
April 8, 2021     Kathryn Francisco MD  [address]  [address] [facility]    Patient: Francia Bolanos   YOB: 1940   Date of Visit: 4/7/2021       Dear Dr Ej Alaniz: Thank you for referring Suleman Jackson to me for evaluation  Below are my notes for this consultation  If you have questions, please do not hesitate to call me  I look forward to following your patient along with you           Sincerely,        Jose Alberto Colindres MD        CC: No Recipients

## 2021-04-07 NOTE — PATIENT INSTRUCTIONS
Transient ischemic attack:  Nusrat Rayo presents for follow-up evaluation with regard to his prior left-sided transient ischemic attack and his known carotid artery stenosis  He reports no new stroke-like symptoms  He takes his medication as prescribed  He did have 1 recent fall that resulted in some bruising around his left hand side, but otherwise he did not endorse any significant bleeding or bruising issues  - for ongoing stroke prevention he should continue his current combination of Plavix, statin, and appropriate blood pressure and glycemic control   -he has done an outstanding job of keeping a blood pressure log and he should continue to check his blood pressure on a regular basis   - he will be due for a repeat carotid Doppler ultrasound in 6 months and I will provide him with that prescription   -I would like him to remain physically active in as much as he feels capable of doing so  - he attempted previously to tolerate a CPAP but was unable to do so  In the future we may come to a point would be reasonable to consider a different type of mask for him if he continues to have symptoms of obstructive sleep apnea  - he has a loop recorder in place however I do not see device checks in the cardiology notes  I would like for him to call the cardiology group after he leaves our office today to ensure that they are receiving the transmission from his machine and evaluating his heart rhythms  Dizziness: He reports that he experiences intermittent episodes of dizziness/being off balance which happen when he goes from sitting to standing  It does not typically occur when he goes from laying to sitting and in the office today he has evidence of mild vestibular dysfunction with no cerebellar dysfunction  Ultimately his description is most consistent with orthostasis    He reports that he drinks fluids all the time     -I would like him to keep track of how much fluid he is drinking in a day for just a couple of days to ensure that he is really getting enough  We would suggest targeting 36-48 oz of non caffeinated beverages per day  - it may be reasonable, as long as he is monitoring his blood pressure, to add a little bit of salt back into his diet to see if that improves his symptoms however if he does not notice a clear improvement he should cut it back out     - in the future we could consider compression stockings if necessary  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 and has a minimally positive head impulse test particularly with head turn to the left  We will continue to defer to the good judgment of the Neurosurgery team with regard to the possibility of radiation treatment versus watchful waiting  From my standpoint he is doing quite well indeed  I will plan for him to come back to the office in 8-12 months but I would be happy to see him sooner if the need should arise  If he has any stroke-like symptoms such as sudden painless loss of vision or double vision, difficulty speaking or swallowing, vertigo/ room spinning that does not quickly resolve, or weakness / numbness affecting 1 side of the face or body he should proceed by ambulance to the nearest emergency room immediately

## 2021-09-09 ENCOUNTER — TRANSCRIBE ORDERS (OUTPATIENT)
Dept: NEUROSURGERY | Facility: CLINIC | Age: 81
End: 2021-09-09

## 2021-09-09 DIAGNOSIS — D33.3 VESTIBULAR SCHWANNOMA (HCC): Primary | ICD-10-CM

## 2021-10-06 ENCOUNTER — APPOINTMENT (OUTPATIENT)
Dept: RADIOLOGY | Facility: CLINIC | Age: 81
End: 2021-10-06
Payer: MEDICARE

## 2021-10-06 ENCOUNTER — TELEPHONE (OUTPATIENT)
Dept: NEUROSURGERY | Facility: CLINIC | Age: 81
End: 2021-10-06

## 2021-10-06 DIAGNOSIS — M54.2 NECK PAIN: ICD-10-CM

## 2021-10-06 PROCEDURE — 72050 X-RAY EXAM NECK SPINE 4/5VWS: CPT

## 2021-11-15 ENCOUNTER — IMMUNIZATIONS (OUTPATIENT)
Dept: FAMILY MEDICINE CLINIC | Facility: HOSPITAL | Age: 81
End: 2021-11-15

## 2021-11-15 DIAGNOSIS — Z23 ENCOUNTER FOR IMMUNIZATION: Primary | ICD-10-CM

## 2021-11-15 PROCEDURE — 91306 COVID-19 MODERNA VACC 0.25 ML BOOSTER: CPT

## 2021-11-15 PROCEDURE — 0013A COVID-19 MODERNA VACC 0.25 ML BOOSTER: CPT

## 2021-12-08 ENCOUNTER — TELEPHONE (OUTPATIENT)
Dept: NEUROLOGY | Facility: CLINIC | Age: 81
End: 2021-12-08

## 2021-12-15 ENCOUNTER — OFFICE VISIT (OUTPATIENT)
Dept: NEUROLOGY | Facility: CLINIC | Age: 81
End: 2021-12-15
Payer: MEDICARE

## 2021-12-15 VITALS
WEIGHT: 315 LBS | TEMPERATURE: 96.7 F | SYSTOLIC BLOOD PRESSURE: 133 MMHG | DIASTOLIC BLOOD PRESSURE: 69 MMHG | HEIGHT: 75 IN | BODY MASS INDEX: 39.17 KG/M2 | HEART RATE: 60 BPM

## 2021-12-15 DIAGNOSIS — D33.3 VESTIBULAR SCHWANNOMA (HCC): ICD-10-CM

## 2021-12-15 DIAGNOSIS — R29.898 RIGHT ARM WEAKNESS: ICD-10-CM

## 2021-12-15 DIAGNOSIS — I65.23 BILATERAL CAROTID ARTERY STENOSIS: ICD-10-CM

## 2021-12-15 DIAGNOSIS — I10 BENIGN ESSENTIAL HYPERTENSION: Chronic | ICD-10-CM

## 2021-12-15 DIAGNOSIS — E11.9 TYPE 2 DIABETES MELLITUS WITHOUT COMPLICATION, WITHOUT LONG-TERM CURRENT USE OF INSULIN (HCC): Chronic | ICD-10-CM

## 2021-12-15 DIAGNOSIS — Z86.73 HISTORY OF TIA (TRANSIENT ISCHEMIC ATTACK): Primary | ICD-10-CM

## 2021-12-15 PROBLEM — I48.0 PAROXYSMAL ATRIAL FIBRILLATION (HCC): Status: ACTIVE | Noted: 2021-10-20

## 2021-12-15 PROCEDURE — 99214 OFFICE O/P EST MOD 30 MIN: CPT | Performed by: PSYCHIATRY & NEUROLOGY

## 2021-12-17 ENCOUNTER — HOSPITAL ENCOUNTER (OUTPATIENT)
Dept: NON INVASIVE DIAGNOSTICS | Facility: HOSPITAL | Age: 81
Discharge: HOME | End: 2021-12-17
Payer: MEDICARE

## 2021-12-17 DIAGNOSIS — I65.23 BILATERAL CAROTID ARTERY STENOSIS: ICD-10-CM

## 2021-12-17 PROCEDURE — 93880 EXTRACRANIAL BILAT STUDY: CPT

## 2021-12-17 PROCEDURE — 93880 EXTRACRANIAL BILAT STUDY: CPT | Performed by: SURGERY

## 2022-02-08 ENCOUNTER — DOCUMENTATION (OUTPATIENT)
Dept: NEUROSURGERY | Facility: CLINIC | Age: 82
End: 2022-02-08

## 2022-02-08 DIAGNOSIS — F40.240 CLAUSTROPHOBIA: ICD-10-CM

## 2022-02-08 RX ORDER — DIAZEPAM 5 MG/1
5 TABLET ORAL
Qty: 2 TABLET | Refills: 0 | Status: SHIPPED | OUTPATIENT
Start: 2022-02-08

## 2022-02-08 NOTE — TELEPHONE ENCOUNTER
Patient called the office reporting Dr Dolores Kanner usually prescribes him valium before the MRI to help him relax due to history of claustrophobia  Reviewed chart and this is accurate  Will route the script to a provider  Patient is aware he must have a  to and from the MRI due to the sedating effects of valium  Patient expressed understanding and was appreciative

## 2022-03-22 ENCOUNTER — HOSPITAL ENCOUNTER (OUTPATIENT)
Dept: MRI IMAGING | Facility: HOSPITAL | Age: 82
Discharge: HOME | End: 2022-03-22
Attending: NEUROLOGICAL SURGERY
Payer: MEDICARE

## 2022-03-22 DIAGNOSIS — D33.3 VESTIBULAR SCHWANNOMA (HCC): ICD-10-CM

## 2022-03-22 PROCEDURE — G1004 CDSM NDSC: HCPCS

## 2022-03-22 PROCEDURE — 70553 MRI BRAIN STEM W/O & W/DYE: CPT

## 2022-03-22 PROCEDURE — A9585 GADOBUTROL INJECTION: HCPCS | Performed by: NEUROLOGICAL SURGERY

## 2022-03-22 RX ADMIN — GADOBUTROL 13 ML: 604.72 INJECTION INTRAVENOUS at 14:35

## 2022-04-13 ENCOUNTER — RADIATION ONCOLOGY CONSULT (OUTPATIENT)
Dept: RADIATION ONCOLOGY | Facility: HOSPITAL | Age: 82
End: 2022-04-13
Attending: STUDENT IN AN ORGANIZED HEALTH CARE EDUCATION/TRAINING PROGRAM
Payer: MEDICARE

## 2022-04-13 ENCOUNTER — OFFICE VISIT (OUTPATIENT)
Dept: NEUROSURGERY | Facility: CLINIC | Age: 82
End: 2022-04-13
Payer: MEDICARE

## 2022-04-13 VITALS
RESPIRATION RATE: 18 BRPM | HEART RATE: 66 BPM | TEMPERATURE: 98 F | OXYGEN SATURATION: 98 % | HEIGHT: 75 IN | DIASTOLIC BLOOD PRESSURE: 68 MMHG | SYSTOLIC BLOOD PRESSURE: 110 MMHG | WEIGHT: 315 LBS | BODY MASS INDEX: 39.17 KG/M2

## 2022-04-13 VITALS
HEART RATE: 66 BPM | TEMPERATURE: 98.1 F | OXYGEN SATURATION: 96 % | HEIGHT: 75 IN | RESPIRATION RATE: 18 BRPM | BODY MASS INDEX: 39.17 KG/M2 | SYSTOLIC BLOOD PRESSURE: 110 MMHG | WEIGHT: 315 LBS | DIASTOLIC BLOOD PRESSURE: 68 MMHG

## 2022-04-13 DIAGNOSIS — D33.3 VESTIBULAR SCHWANNOMA (HCC): Primary | ICD-10-CM

## 2022-04-13 PROCEDURE — 77470 SPECIAL RADIATION TREATMENT: CPT | Performed by: STUDENT IN AN ORGANIZED HEALTH CARE EDUCATION/TRAINING PROGRAM

## 2022-04-13 PROCEDURE — 77263 THER RADIOLOGY TX PLNG CPLX: CPT | Performed by: STUDENT IN AN ORGANIZED HEALTH CARE EDUCATION/TRAINING PROGRAM

## 2022-04-13 PROCEDURE — 99211 OFF/OP EST MAY X REQ PHY/QHP: CPT | Performed by: STUDENT IN AN ORGANIZED HEALTH CARE EDUCATION/TRAINING PROGRAM

## 2022-04-13 PROCEDURE — 99215 OFFICE O/P EST HI 40 MIN: CPT | Performed by: NEUROLOGICAL SURGERY

## 2022-04-13 RX ORDER — LORAZEPAM 0.5 MG/1
0.5 TABLET ORAL DAILY PRN
Qty: 6 TABLET | Refills: 0 | Status: SHIPPED | OUTPATIENT
Start: 2022-04-13 | End: 2022-07-21 | Stop reason: SDUPTHER

## 2022-04-13 NOTE — PROGRESS NOTES
Oma Roberts 1940 is a [de-identified] y o  male  Patient here to discuss possible SRS for left vestibular schwannoma  [de-identified]year old presents in February 202 with abnormal sensation in left hand, left vestibular schwannoma found incidentally in stroke work up  He has chronic hearing loss with left ear worse than right  He was seen by Neurosurgery in March 2021 at that time he decided on surveillance with follow up MRI brain IAC in 1 year  He has significant past medical history MI x3, TIA x3, HTN, HLD, CAD, A-fib and Type 2 DM, kidney stones  He also has an elevated PSA of 5 33, per notes from [facility] he had benign prostate biopsy in the past and refuses repeat biospy  He was order MRI did not complete, will continue to monitor PSA         3/9/21 MRI brain IAC w wo contrast  IMPRESSION:  Interval increase in size of the left-sided vestibular schwannoma, as described above with mild progressive mass effect on the adjacent middle cerebellar peduncle /cindy  12/15/22 Neurology - Dr Ronald Goins fall from loss of balance  Right arm weakness   No new tinnitus, progressive changes or clear changes in coordination      3/22/22 MRI brain IAC w wo contrast  IMPRESSION:  Further increased size of left-sided vestibular schwannoma with slightly worsened mass effect upon left lateral cindy  Upcoming:      Oncology History Overview Note   Patient here to discuss possible SRS for left vestibular schwannoma  [de-identified]year old presents in February 202 with abnormal sensation in left hand, left vestibular schwannoma found incidentally in stroke work up  He has chronic hearing loss with left ear worse than right  He was seen by Neurosurgery in March 2021 at that time he decided on surveillance with follow up MRI brain IAC in 1 year  He has significant past medical history MI x3, TIA x3, HTN, HLD, CAD, A-fib and Type 2 DM, kidney stones   He also has an elevated PSA of 5 33, per notes from [facility] he had benign prostate biopsy in the past and refuses repeat biospy  He was order MRI did not complete, will continue to monitor PSA         3/9/21 MRI brain IAC w wo contrast  IMPRESSION:  Interval increase in size of the left-sided vestibular schwannoma, as described above with mild progressive mass effect on the adjacent middle cerebellar peduncle /cindy  12/15/22 Neurology - Dr Kingston Black fall from loss of balance  Right arm weakness   No new tinnitus, progressive changes or clear changes in coordination      3/22/22 MRI brain IAC w wo contrast  IMPRESSION:  Further increased size of left-sided vestibular schwannoma with slightly worsened mass effect upon left lateral cindy  Upcoming:       Vestibular schwannoma ([facility] [address] )   2020 Initial Diagnosis    Vestibular schwannoma ([facility] [address] )         Review of Systems:  Review of Systems    Clinical Trial: {YES/NO:}    OB/GYN History:  The patient underwent menarche at {0-20:76670} years  Menopause Status {Blank Single:33969::"Pre","Geovanna","Post","Unknown","No Answer"}  No LMP for male patient  Menopause at {30-50:21400}} years  Menopause Reason***  Hormone replacement therapy: {yes/no:63}  Years used***   {NUMBERS; 0-15:752521}   Para {NUMBERS; 0-15:443237}   Age at first delivery being {15-40:52169} years  Nursing: {yes/no:63}  Birth control pills: {yes/no:63}    Years used***    Pregnancy test needed:  {yes/no:63}    ONCOTYPE/MAMMOPRINT results***    PFT***    Prior Radiation***    Teaching***    [facility]***    Implantable Devices (Port, Pacemaker, pain stimulator)***    Hip Replacement***    Covid Vaccine Status***     [unfilled]  Health Maintenance   Topic Date Due    Medicare Annual Wellness Visit (AWV)  Never done    Diabetic Foot Exam  Never done    DM Eye Exam  Never done    URINE MICROALBUMIN  Never done    Depression Screening  Never done    BMI: Followup Plan  Never done    Fall Risk  Never done    Pneumococcal Vaccine: 65+ Years (2 of 4 - PPSV23) 04/14/2017    HEMOGLOBIN A1C  07/27/2022    BMI: Adult  12/15/2022    DTaP,Tdap,and Td Vaccines (2 - Td or Tdap) 01/12/2027    Influenza Vaccine  Completed    COVID-19 Vaccine  Completed    HIB Vaccine  Aged Out    Hepatitis B Vaccine  Aged Out    IPV Vaccine  Aged Out    Hepatitis A Vaccine  Aged Out    Meningococcal ACWY Vaccine  Aged Out    HPV Vaccine  Aged Out     Past Medical History:   Diagnosis Date    Coronary artery disease     Diabetes [facility] [address] [facility]     Heart attack ([facility] [address] )     Hyperlipidemia     Hypertension     Kidney stone     TIA (transient ischemic attack)      Past Surgical History:   Procedure Laterality Date    CARDIAC SURGERY      CORONARY ANGIOPLASTY WITH STENT PLACEMENT      REPLACEMENT TOTAL KNEE BILATERAL      SINUS SURGERY      TONSILLECTOMY       Family History   Problem Relation Age of Onset    Alzheimer's disease Mother     Pulmonary fibrosis Father      Social History     Tobacco Use    Smoking status: Never Smoker    Smokeless tobacco: Never Used   Vaping Use    Vaping Use: Never used   Substance Use Topics    Alcohol use: Never    Drug use: Never        Current Outpatient Medications:     amLODIPine (NORVASC) 5 mg tablet, Take 5 mg by mouth daily, Disp: , Rfl:     apixaban (ELIQUIS) 5 mg, Take 5 mg by mouth 2 (two) times a day, Disp: , Rfl:     Ascorbic Acid (vitamin C) 100 MG tablet, Take 100 mg by mouth daily (Patient not taking: Reported on 12/15/2021 ), Disp: , Rfl:     atorvastatin (LIPITOR) 80 mg tablet, Take 1 tablet (80 mg total) by mouth every evening (Patient taking differently: Take 40 mg by mouth every evening ), Disp: 30 tablet, Rfl: 0    cholecalciferol (VITAMIN D3) 1,000 units tablet, Take 1,000 Units by mouth daily, Disp: , Rfl:     diazepam (VALIUM) 5 mg tablet, Take 1 tablet (5 mg total) by mouth 60 minutes pre-procedure May repeat x1 30 mins prior if needed  , Disp: 2 tablet, Rfl: 0    Meredith, Zingiber officinalis, (MEREDITH ROOT PO), Take by mouth, Disp: , Rfl:     isosorbide dinitrate (ISORDIL) 30 mg tablet, Take 30 mg by mouth daily, Disp: , Rfl:     metFORMIN (GLUCOPHAGE) 500 mg tablet, Take 500 mg by mouth 2 (two) times a day with meals , Disp: , Rfl:     metoprolol tartrate (LOPRESSOR) 25 mg tablet, Take 25 mg by mouth every 12 (twelve) hours , Disp: , Rfl:     Multiple Vitamins-Minerals (CENTRUM SILVER 50+MEN PO), Take 1 tablet by mouth daily, Disp: , Rfl:     Omega-3 Fatty Acids (FISH OIL) 1200 MG CAPS, Take 1 capsule by mouth 2 (two) times a day, Disp: , Rfl:     Zinc Sulfate (ZINC 15 PO), Take by mouth (Patient not taking: Reported on 12/15/2021 ), Disp: , Rfl:   Allergies   Allergen Reactions    Lisinopril Anaphylaxis     Anaphylaxis    Bacitracin Rash      There were no vitals filed for this visit

## 2022-04-13 NOTE — PROGRESS NOTES
Consultation - Radiation Oncology      ALYSSIA:[id number] : 1940  Encounter: [id number]  Patient Information: [address]   Chief Complaint   Patient presents with    Consult     Cancer Staging  No matching staging information was found for the patient  Assessment and Plan:  Mr Charlene Mustafa is an 82-year-old man with bilateral hearing loss (L > R) who was incidentally found to have a left CPA mass radiographically consistent with vestibular schwannoma, which has demonstrated interval enlargement over serial scans  He is seen today in concert with Dr Anthony Lemons for consideration of definitive SRS/SRT  We reviewed the patient's clinical history and imaging including his most recent MRI brain IAC as compared to prior imaging studies  On my review, his left vestibular schwannoma has shown progressive enlargement with increasing mass effect upon the adjacent cindy  While this is not appear to be causing any symptoms at present (apart from his left-sided hearing loss), it is likely that with continued growth symptomatic compression of his brainstem would occur in the coming years  We discussed options for management including surgical resection versus stereotactic RT  As he has radiographic or clinical evidence of brainstem compression/injury at present, I would favor SRT  We reviewed the potential acute and late toxicities associated with SRT to include fatigue, headaches, nausea/vomiting, worsened hearing, focal alopecia, and radiation necrosis  The patient has weight for given the opportunity to ask multiple questions, all of which were answered to their satisfaction  He was amenable to proceeding with RT as described; informed consent was obtained at today's visit  He will return later this week for CT simulation with SRT to begin next week    I will coordinate treatment with Dr Anthony Lemons and will tentatively anticipate treating his schwannoma to a dose of 25 Gy in 5 fractions (pending technical feasibility)  I will remain available in the coming days should any questions arise  History of Present Illness   Mr Gerard Castaneda is an 42-year-old man with bilateral hearing loss and multiple other medical comorbidities including CAD with multiple stents, AFib, type 2 diabetes, hypertension, hyperlipidemia, who was initially found to have a left CP angle mass consistent with schwannoma in 2/2020 after presenting with possible stroke like symptoms  On MRI brain IAC (03/06/2020) this measured 1 8 x 2 x 1 7cm in dimension with mild mass effect on the lateral   Plan at that time was for conservative management with close interval follow-up  He has since undergone repeat MRI brain IAC on 03/09/2021 and more recently on 03/22/2022, which together showed interval increase in the size of the left-sided vestibular schwannoma with worsening mass effect upon the left lateral cindy, measuring 2 3 x 2 4 x 2 4 cm on most recent MRI  He is seen today in consideration of definitive SRS/SRT  Currently the patient is doing well overall  He does have baseline bilateral hearing loss, though left is significantly worse than right  He has arthritic pain throughout his body and does have some difficulty with gait (dizziness) and right handed weakness  He has occasional headaches, which are well treated with OTC medication         Historical Information   Oncology History   Vestibular schwannoma ([facility] [address] )   2/5/2020 Initial Diagnosis    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)           Past Medical History:   Diagnosis Date    Coronary artery disease     Diabetes ([facility] [address] )     Diabetes mellitus ([facility] [address] )     Heart attack ([facility] [address] )     Hyperlipidemia     Hypertension     Kidney stone     TIA (transient ischemic attack)      Past Surgical History:   Procedure Laterality Date    CARDIAC SURGERY      CORONARY ANGIOPLASTY WITH STENT PLACEMENT      REPLACEMENT TOTAL KNEE BILATERAL      SINUS SURGERY      TONSILLECTOMY Family History   Problem Relation Age of Onset    Alzheimer's disease Mother     Pulmonary fibrosis Father        Social History   Social History     Substance and Sexual Activity   Alcohol Use Never     Social History     Substance and Sexual Activity   Drug Use Never     Social History     Tobacco Use   Smoking Status Never Smoker   Smokeless Tobacco Never Used         Meds/Allergies     Current Outpatient Medications:     amLODIPine (NORVASC) 5 mg tablet, Take 5 mg by mouth daily, Disp: , Rfl:     apixaban (ELIQUIS) 5 mg, Take 5 mg by mouth 2 (two) times a day, Disp: , Rfl:     atorvastatin (LIPITOR) 80 mg tablet, Take 1 tablet (80 mg total) by mouth every evening (Patient taking differently: Take 40 mg by mouth every evening ), Disp: 30 tablet, Rfl: 0    cholecalciferol (VITAMIN D3) 1,000 units tablet, Take 1,000 Units by mouth daily, Disp: , Rfl:     Ginger, Zingiber officinalis, (GINGER ROOT PO), Take by mouth, Disp: , Rfl:     isosorbide dinitrate (ISORDIL) 30 mg tablet, Take 30 mg by mouth daily, Disp: , Rfl:     metFORMIN (GLUCOPHAGE) 500 mg tablet, Take 500 mg by mouth 2 (two) times a day with meals , Disp: , Rfl:     metoprolol tartrate (LOPRESSOR) 25 mg tablet, Take 25 mg by mouth every 12 (twelve) hours , Disp: , Rfl:     Multiple Vitamins-Minerals (CENTRUM SILVER 50+MEN PO), Take 1 tablet by mouth daily, Disp: , Rfl:     Omega-3 Fatty Acids (FISH OIL) 1200 MG CAPS, Take 1 capsule by mouth 2 (two) times a day, Disp: , Rfl:     Ascorbic Acid (vitamin C) 100 MG tablet, Take 100 mg by mouth daily (Patient not taking: Reported on 12/15/2021 ), Disp: , Rfl:     diazepam (VALIUM) 5 mg tablet, Take 1 tablet (5 mg total) by mouth 60 minutes pre-procedure May repeat x1 30 mins prior if needed   (Patient not taking: Reported on 4/13/2022 ), Disp: 2 tablet, Rfl: 0    LORazepam (Ativan) 0 5 mg tablet, Take 1 tablet (0 5 mg total) by mouth daily as needed for anxiety (Prior to CT simulation and Radiation treatments ) for up to 6 doses, Disp: 6 tablet, Rfl: 0    Zinc Sulfate (ZINC 15 PO), Take by mouth (Patient not taking: Reported on 12/15/2021 ), Disp: , Rfl:   Allergies   Allergen Reactions    Lisinopril Anaphylaxis     Anaphylaxis    Bacitracin Rash     Review of Systems   Constitutional: Positive for activity change (decreased) and fatigue (mild)  Negative for appetite change  HENT: Positive for hearing loss (L>R), sinus pressure, sinus pain and tinnitus (bilateral)  Eyes: Negative  Bilateral cataract surgery     Respiratory: Positive for shortness of breath (HERMOSILLO)  Negative for cough, chest tightness and wheezing  Sleep apnea - does not wear CPAP   Cardiovascular: Positive for leg swelling (L>R)  Gastrointestinal: Negative  Endocrine: Positive for cold intolerance  Genitourinary: Negative for dysuria, frequency, hematuria and urgency  Nocturia 2-3x   Musculoskeletal: Positive for arthralgias (generalized), back pain (intermittent lower back), gait problem (unsteady at times), neck pain (right side) and neck stiffness  Skin: Negative  Allergic/Immunologic: Positive for environmental allergies  Neurological: Positive for dizziness (bending over), speech difficulty (trouble word finding ), weakness (hands), light-headedness and headaches (occasional mild occipital headache - resolves with tyelnol)  Negative for tremors, seizures and numbness  Hematological: Bruises/bleeds easily  Psychiatric/Behavioral: Negative  Negative for confusion, decreased concentration and sleep disturbance   The patient is not nervous/anxious          OBJECTIVE:   /68 (BP Location: Left arm, Patient Position: Sitting, Cuff Size: Large)   Pulse 66   Temp 98 1 °F (36 7 °C) (Temporal)   Resp 18   Ht 6' 3" (1 905 m)   Wt (!) 149 kg (328 lb 3 2 oz)   SpO2 96%   BMI 41 02 kg/m²   Pain Assessment:  0  Performance Status: ECOG/Zubrod/WHO: 1 - Symptomatic but completely ambulatory    Physical Exam   Well-appearing  NAD  Hard of hearing  No overt neurologic deficits noted  No increased work of breathing  Lower extremity edema noted in the bilateral legs, left greater than right  No rashes or lesions noted otherwise  Fluent speech  Normal ambulation  RESULTS  Lab Results    Chemistry        Component Value Date/Time    K 4 1 06/07/2020 1939     06/07/2020 1939    CO2 25 06/07/2020 1939    BUN 18 06/07/2020 1939    CREATININE 1 24 06/07/2020 1939        Component Value Date/Time    CALCIUM 9 3 06/07/2020 1939            Lab Results   Component Value Date    WBC 6 00 06/07/2020    HGB 15 6 06/07/2020    HCT 43 6 06/07/2020    MCV 90 06/07/2020     06/07/2020         Imaging Studies  MRI brain IAC wo and w contrast    Result Date: 3/24/2022  Narrative: MRI BRAIN AND IAC'S -  WITH AND WITHOUT CONTRAST INDICATION: D33 3: Benign neoplasm of cranial nerves  COMPARISON:  Brain MRI 8 3/9/2021 and 3/6/2020 TECHNIQUE: Brain:   Sagittal T1, axial T2, axial [place], axial T1, axial FLAIR, axial diffusion imaging  Axial T1 postcontrast   Axial BRAVO post contrast  IAC'S:  Coronal FIESTA, coronal T1 postcontrast, axial T1 postcontrast with fat suppression  Targeted images of the IAC'S were performed requiring additional time at acquisition and interpretation of approximately 25% IV Contrast:  13 mL of Gadobutrol injection (SINGLE-DOSE) IMAGE QUALITY:   Diagnostic  FINDINGS: BRAIN PARENCHYMA:  There is no discrete mass, mass effect or midline shift  Brainstem and cerebellum demonstrate normal signal  There is no intracranial hemorrhage  Diffusion imaging is unremarkable  Grossly stable nonspecific foci of T2/FLAIR hyperintensities involving periventricular and subcortical white matter, most compatible with mild microangiopathic change  No abnormal parenchymal enhancement    IAC'S:  Further increased size of left CP angle enhancing mass with hemorrhagic components extending into the left IAC consistent with vestibular schwannoma  It measures 2 3 x 2 4 x 2 4 cm (craniocaudal by AP by transverse), previously 2 2 x 2 3 x 2 cm on 3/9/2021, and 1 8 x 2 x 1 7 cm on 3/6/2020  There is slightly increased mass effect upon left lateral cindy  VENTRICLES:  Normal  SELLA AND PITUITARY GLAND:  Normal  ORBITS:  Bilateral cataract surgery  PARANASAL SINUSES:  Normal  VASCULATURE:  Evaluation of the major intracranial vasculature demonstrates appropriate flow voids  CALVARIUM AND SKULL BASE:  Normal  EXTRACRANIAL SOFT TISSUES:  Normal      Impression: Further increased size of left-sided vestibular schwannoma with slightly worsened mass effect upon left lateral cindy  Workstation performed: [id number]         Pathology: None  ASSESSMENT  1  Vestibular schwannoma (HCC)  LORazepam (Ativan) 0 5 mg tablet    Radiation Simulation Treatment     Cancer Staging  No matching staging information was found for the patient  PLAN/DISCUSSION  Orders Placed This Encounter   Procedures   [address] Treatment        Kinsey Estevez MD  2/14/6575,9:58 PM      Portions of the record may have been created with voice recognition software  Occasional wrong word or "sound a like" substitutions may have occurred due to the inherent limitations of voice recognition software  Read the chart carefully and recognize, using context, where substitutions have occurred

## 2022-04-13 NOTE — PROGRESS NOTES
Neurosurgery Office Note  Pasquale Chanel [de-identified] y o  male MRN: [id number]      Assessment/Plan      Diagnoses and all orders for this visit:    Vestibular schwannoma [facility])          Discussion:    Pain Score: 0-No pain    [de-identified]year-old male initially seen 02/2020 as stroke alert  Imaging at that time demonstrated left vestibular schwannoma  He had history of bilateral hearing loss secondary to work at a some [employer], but on audiometry was shown to have some additional left-sided hearing loss across all frequencies i e  sensorineural   Managed initially with surveillance, and when this lesion showed increase in size, was recommended for radiation treatment  Despite its growth, the patient deferred wish to continue with surveillance  Patient has not had any additional dated audiometry  He does not use the phone on the left side  Mass on follow-up imaging has demonstrated increasing size, including some cystic change  There is some brainstem displacement this point  Patient seen in conjunction with Dr Svetlana Poe at the [facility] today  Imaging was reviewed at [facility]  We are recommending treatment SRT, hopefully before the patient develops significant symptoms or signs of brainstem displacement/compression  He would not be a surgical candidate with his rather extensive medical history that includes morbid obesity, his age, 3 MIs, 2 strokes/TIAs, hypertension, hyperlipidemia, CAD, diabetes, etcetera  After discussion, reviewing the expected benefit, and attendant risks of SRT, he would like to proceed  Written consent obtained  He was scheduled for simulation  Audiometry in 2020 - SRT was 55, Speech discrim was 64% on left, at that time  Koos stage 3 to perhaps 4  H-B facial 1      04/13/22 Metrics: [id number] [id number]; VAS 50; ECOG 1; KPS 80; MOCA 26/30         CHIEF COMPLAINT    Chief Complaint   Patient presents with    Follow-up     DISCUSS SRS/SRT       HISTORY    History of Present Illness     [de-identified]y o  year old male     HPI    See Discussion    REVIEW OF SYSTEMS    Review of Systems   Constitutional: Positive for fatigue (mild)  HENT: Positive for hearing loss (Left ear), postnasal drip (allergies related), rhinorrhea (allergies related) and tinnitus (bilateral ears)  Negative for ear pain (resolved since last visit, Pain left ear, 1 month ago  )  Pain left side cheek bone 1 month ago  Eyes: Negative  Wears glasses to read only   Respiratory: Positive for shortness of breath (on exertion only)  Cardiovascular: Positive for leg swelling (bilateral legs after bilateral knee surgery)  Gastrointestinal: Negative  Endocrine: Positive for cold intolerance  Genitourinary: Positive for frequency (nocturia x2-3)  Musculoskeletal: Positive for arthralgias, back pain (across lower back), gait problem (balance issue) and neck pain  Skin: Negative  Allergic/Immunologic: Positive for environmental allergies  Neurological: Positive for dizziness (especially when bending over), speech difficulty (trouble finding words), weakness (bilateral hands/fingers and generalized) and headaches (once in a while a mild headache, back of head/above neck)  Negative for tremors, seizures, syncope and numbness  Hematological: Negative  Psychiatric/Behavioral: Negative            Meds/Allergies     Current Outpatient Medications   Medication Sig Dispense Refill    amLODIPine (NORVASC) 5 mg tablet Take 5 mg by mouth daily      apixaban (ELIQUIS) 5 mg Take 5 mg by mouth 2 (two) times a day      atorvastatin (LIPITOR) 80 mg tablet Take 1 tablet (80 mg total) by mouth every evening (Patient taking differently: Take 40 mg by mouth every evening ) 30 tablet 0    cholecalciferol (VITAMIN D3) 1,000 units tablet Take 1,000 Units by mouth daily      Meredith, Zingiber officinalis, (MEREDITH ROOT PO) Take by mouth      isosorbide dinitrate (ISORDIL) 30 mg tablet Take 30 mg by mouth daily      metFORMIN (GLUCOPHAGE) 500 mg tablet Take 500 mg by mouth 2 (two) times a day with meals       metoprolol tartrate (LOPRESSOR) 25 mg tablet Take 25 mg by mouth every 12 (twelve) hours       Multiple Vitamins-Minerals (CENTRUM SILVER 50+MEN PO) Take 1 tablet by mouth daily      Omega-3 Fatty Acids (FISH OIL) 1200 MG CAPS Take 1 capsule by mouth 2 (two) times a day      Ascorbic Acid (vitamin C) 100 MG tablet Take 100 mg by mouth daily (Patient not taking: Reported on 12/15/2021 )      diazepam (VALIUM) 5 mg tablet Take 1 tablet (5 mg total) by mouth 60 minutes pre-procedure May repeat x1 30 mins prior if needed  (Patient not taking: Reported on 4/13/2022 ) 2 tablet 0    Zinc Sulfate (ZINC 15 PO) Take by mouth (Patient not taking: Reported on 12/15/2021 )       No current facility-administered medications for this visit         Allergies   Allergen Reactions    Lisinopril Anaphylaxis     Anaphylaxis    Bacitracin Rash       PAST HISTORY    Past Medical History:   Diagnosis Date    Coronary artery disease     [facility] [address] [facility]     Diabetes mellitus [facility] [address] [facility]     Heart attack [facility] [address] [facility]     Hyperlipidemia     Hypertension     Kidney stone     TIA (transient ischemic attack)        Past Surgical History:   Procedure Laterality Date    CARDIAC SURGERY      CORONARY ANGIOPLASTY WITH STENT PLACEMENT      REPLACEMENT TOTAL KNEE BILATERAL      SINUS SURGERY      TONSILLECTOMY         Social History     Tobacco Use    Smoking status: Never Smoker    Smokeless tobacco: Never Used   Vaping Use    Vaping Use: Never used   Substance Use Topics    Alcohol use: Never    Drug use: Never       Family History   Problem Relation Age of Onset    Alzheimer's disease Mother     Pulmonary fibrosis Father          The following portions of the patient's history were reviewed in this encounter and updated as appropriate: Past medical, surgical, family, and social history, as well as medications, allergies, and review of systems  EXAM    Vitals:Blood pressure 110/68, pulse 66, temperature 98 °F (36 7 °C), resp  rate 18, height 6' 3" (1 905 m), weight (!) 149 kg (328 lb 3 2 oz), SpO2 98 %  ,Body mass index is 41 02 kg/m²  Physical Exam  Vitals reviewed  Constitutional:       Appearance: Normal appearance  He is well-developed  He is obese  HENT:      Head: Normocephalic and atraumatic  Eyes:      General: No scleral icterus  Cardiovascular:      Rate and Rhythm: Normal rate  Pulmonary:      Effort: Pulmonary effort is normal    Musculoskeletal:      Cervical back: Neck supple  Skin:     General: Skin is warm and dry  Neurological:      Mental Status: He is alert and oriented to person, place, and time  Sensory: No sensory deficit  Psychiatric:         Speech: Speech normal          Behavior: Behavior normal          Neurologic Exam     Mental Status   Oriented to person, place, and time  Attention: normal    Speech: speech is normal   Level of consciousness: alert    Cranial Nerves     CN VII   Facial expression full, symmetric  Motor Exam   Muscle bulk: normal  Overall muscle tone: normal  Moves all extremities, grossly normal     Gait, Coordination, and Reflexes     Tremor   Resting tremor: absent  Intention tremor: absent  Action tremor: absent  No aids  MEDICAL DECISION MAKING    Imaging Studies:     MRI brain IAC wo and w contrast    Result Date: 3/24/2022  Narrative: MRI BRAIN AND IAC'S -  WITH AND WITHOUT CONTRAST INDICATION: D33 3: Benign neoplasm of cranial nerves  COMPARISON:  Brain MRI 8 3/9/2021 and 3/6/2020 TECHNIQUE: Brain:   Sagittal T1, axial T2, axial [place], axial T1, axial FLAIR, axial diffusion imaging  Axial T1 postcontrast   Axial BRAVO post contrast  IAC'S:  Coronal FIESTA, coronal T1 postcontrast, axial T1 postcontrast with fat suppression   Targeted images of the IAC'S were performed requiring additional time at acquisition and interpretation of approximately 25% IV Contrast:  13 mL of Gadobutrol injection (SINGLE-DOSE) IMAGE QUALITY:   Diagnostic  FINDINGS: BRAIN PARENCHYMA:  There is no discrete mass, mass effect or midline shift  Brainstem and cerebellum demonstrate normal signal  There is no intracranial hemorrhage  Diffusion imaging is unremarkable  Grossly stable nonspecific foci of T2/FLAIR hyperintensities involving periventricular and subcortical white matter, most compatible with mild microangiopathic change  No abnormal parenchymal enhancement  IAC'S:  Further increased size of left CP angle enhancing mass with hemorrhagic components extending into the left IAC consistent with vestibular schwannoma  It measures 2 3 x 2 4 x 2 4 cm (craniocaudal by AP by transverse), previously 2 2 x 2 3 x 2 cm on 3/9/2021, and 1 8 x 2 x 1 7 cm on 3/6/2020  There is slightly increased mass effect upon left lateral cindy  VENTRICLES:  Normal  SELLA AND PITUITARY GLAND:  Normal  ORBITS:  Bilateral cataract surgery  PARANASAL SINUSES:  Normal  VASCULATURE:  Evaluation of the major intracranial vasculature demonstrates appropriate flow voids  CALVARIUM AND SKULL BASE:  Normal  EXTRACRANIAL SOFT TISSUES:  Normal      Impression: Further increased size of left-sided vestibular schwannoma with slightly worsened mass effect upon left lateral cindy  Workstation performed: [id number]       I have personally reviewed pertinent reports  and I have personally reviewed pertinent films in PACS with a Radiologist  at [facility]  PLEASE NOTE:  This encounter may have been completed utilizing the [employer] original products Direct Speech Voice Recognition Software  Grammatical errors, random word insertions, pronoun errors and incomplete sentences are occasional consequences of the system due to software limitations, ambient noise and hardware issues  These may be missed by proof reading prior to affixing electronic signature   Any questions or concerns about the content, text or information contained within the body of this dictation should be directly addressed to the advanced practitioner or physician for clarification

## 2022-04-13 NOTE — PROGRESS NOTES
Eliud Mendez 1940 is a [de-identified] y o  male  Patient here to discuss possible SRS for left vestibular schwannoma  [de-identified]year old presents in February 202 with abnormal sensation in left hand, left vestibular schwannoma found incidentally in stroke work up  He has chronic hearing loss with left ear worse than right  He was seen by Neurosurgery in March 2021 at that time he decided on surveillance with follow up MRI brain IAC in 1 year  He has significant past medical history MI x3, TIA x3, HTN, HLD, CAD, A-fib and Type 2 DM, kidney stones  He also has an elevated PSA of 5 33, per [facility] from [facility] he had benign prostate biopsy in the past and refuses repeat biospy  He was order MRI did not complete, will continue to monitor PSA         3/9/21 MRI brain IAC w wo contrast  IMPRESSION:  Interval increase in size of the left-sided vestibular schwannoma, as described above with mild progressive mass effect on the adjacent middle cerebellar peduncle /cindy  12/15/22 Neurology - Dr Chapin Code fall from loss of balance  Right arm weakness   No new tinnitus, progressive changes or clear changes in coordination      3/22/22 MRI brain IAC w wo contrast  IMPRESSION:  Further increased size of left-sided vestibular schwannoma with slightly worsened mass effect upon left lateral cindy  Upcoming:    Oncology History   Vestibular schwannoma ([address] )   2/5/2020 Initial Diagnosis    Vestibular schwannoma ([address] )         Review of Systems:  Review of Systems   Constitutional: Positive for activity change (decreased) and fatigue (mild)  Negative for appetite change  HENT: Positive for hearing loss (L>R), sinus pressure, sinus pain and tinnitus (bilateral)  Eyes: Negative  Bilateral cataract surgery     Respiratory: Positive for shortness of breath (HERMOSILLO)  Negative for cough, chest tightness and wheezing  Sleep apnea - does not wear CPAP   Cardiovascular: Positive for leg swelling (L>R)     Gastrointestinal: Negative  Endocrine: Positive for cold intolerance  Genitourinary: Negative for dysuria, frequency, hematuria and urgency  Nocturia 2-3x   Musculoskeletal: Positive for arthralgias (generalized), back pain (intermittent lower back), gait problem (unsteady at times), neck pain (right side) and neck stiffness  Skin: Negative  Allergic/Immunologic: Positive for environmental allergies  Neurological: Positive for dizziness (bending over), speech difficulty (trouble word finding ), weakness (hands), light-headedness and headaches (occasional mild occipital headache - resolves with tyelnol)  Negative for tremors, seizures and numbness  Hematological: Bruises/bleeds easily  Psychiatric/Behavioral: Negative  Negative for confusion, decreased concentration and sleep disturbance  The patient is not nervous/anxious  Clinical Trial: no    Pain assessment: 0    PFT: n/a    Prior Radiation: no    Teaching: [employer] Radiation Packet, [facility]    MST: complete    Implantable Devices (Port, pacemaker, pain stimulator):  Loop recorder    Hip Replacement: no    Covid Vaccine Status: Fully vaccinated including booster    Health Maintenance   Topic Date Due    Medicare Annual Wellness Visit (AWV)  Never done    Diabetic Foot Exam  Never done    DM Eye Exam  Never done    URINE MICROALBUMIN  Never done    Depression Screening  Never done    BMI: Followup Plan  Never done    Fall Risk  Never done    Pneumococcal Vaccine: 65+ Years (2 of 4 - PPSV23) 04/14/2017    HEMOGLOBIN A1C  07/27/2022    BMI: Adult  12/15/2022    DTaP,Tdap,and Td Vaccines (2 - Td or Tdap) 01/12/2027    Influenza Vaccine  Completed    COVID-19 Vaccine  Completed    HIB Vaccine  Aged Out    Hepatitis B Vaccine  Aged Out    IPV Vaccine  Aged Out    Hepatitis A Vaccine  Aged Out    Meningococcal ACWY Vaccine  Aged Out    HPV Vaccine  Aged Out       Past Medical History:   Diagnosis Date    Coronary artery disease     [facility] [address] )  [facility] attack [address] [address] [facility]     Hyperlipidemia     Hypertension     Kidney stone     TIA (transient ischemic attack)        Past Surgical History:   Procedure Laterality Date    CARDIAC SURGERY      CORONARY ANGIOPLASTY WITH STENT PLACEMENT      REPLACEMENT TOTAL KNEE BILATERAL      SINUS SURGERY      TONSILLECTOMY         Family History   Problem Relation Age of Onset    Alzheimer's disease Mother     Pulmonary fibrosis Father        Social History     Tobacco Use    Smoking status: Never Smoker    Smokeless tobacco: Never Used   Vaping Use    Vaping Use: Never used   Substance Use Topics    Alcohol use: Never    Drug use: Never          Current Outpatient Medications:     amLODIPine (NORVASC) 5 mg tablet, Take 5 mg by mouth daily, Disp: , Rfl:     apixaban (ELIQUIS) 5 mg, Take 5 mg by mouth 2 (two) times a day, Disp: , Rfl:     Ascorbic Acid (vitamin C) 100 MG tablet, Take 100 mg by mouth daily (Patient not taking: Reported on 12/15/2021 ), Disp: , Rfl:     atorvastatin (LIPITOR) 80 mg tablet, Take 1 tablet (80 mg total) by mouth every evening (Patient taking differently: Take 40 mg by mouth every evening ), Disp: 30 tablet, Rfl: 0    cholecalciferol (VITAMIN D3) 1,000 units tablet, Take 1,000 Units by mouth daily, Disp: , Rfl:     diazepam (VALIUM) 5 mg tablet, Take 1 tablet (5 mg total) by mouth 60 minutes pre-procedure May repeat x1 30 mins prior if needed  , Disp: 2 tablet, Rfl: 0    Meredith, Zingiber officinalis, (MEREDITH ROOT PO), Take by mouth, Disp: , Rfl:     isosorbide dinitrate (ISORDIL) 30 mg tablet, Take 30 mg by mouth daily, Disp: , Rfl:     metFORMIN (GLUCOPHAGE) 500 mg tablet, Take 500 mg by mouth 2 (two) times a day with meals , Disp: , Rfl:     metoprolol tartrate (LOPRESSOR) 25 mg tablet, Take 25 mg by mouth every 12 (twelve) hours , Disp: , Rfl:     Multiple Vitamins-Minerals (CENTRUM SILVER 50+MEN PO), Take 1 tablet by mouth daily, Disp: , Rfl:     Omega-3 Fatty Acids (FISH OIL) 1200 MG CAPS, Take 1 capsule by mouth 2 (two) times a day, Disp: , Rfl:     Zinc Sulfate (ZINC 15 PO), Take by mouth (Patient not taking: Reported on 12/15/2021 ), Disp: , Rfl:     Allergies   Allergen Reactions    Lisinopril Anaphylaxis     Anaphylaxis    Bacitracin Rash        There were no vitals filed for this visit

## 2022-04-14 ENCOUNTER — APPOINTMENT (OUTPATIENT)
Dept: RADIATION ONCOLOGY | Facility: HOSPITAL | Age: 82
End: 2022-04-14
Attending: STUDENT IN AN ORGANIZED HEALTH CARE EDUCATION/TRAINING PROGRAM
Payer: MEDICARE

## 2022-04-14 ENCOUNTER — TELEPHONE (OUTPATIENT)
Dept: RADIATION ONCOLOGY | Facility: HOSPITAL | Age: 82
End: 2022-04-14

## 2022-04-14 NOTE — TELEPHONE ENCOUNTER
Patient called, he is scheduled for a simulation today, says he has had vertigo since about 3am and wondering if he should still come in today

## 2022-04-14 NOTE — TELEPHONE ENCOUNTER
Per Asiya Virgen - Dr Emir Guzman is okay to [facility] patient today, but brenda declined   rescheduled to 4/15/22

## 2022-04-15 ENCOUNTER — RADIATION THERAPY TREATMENT (OUTPATIENT)
Dept: RADIATION ONCOLOGY | Facility: HOSPITAL | Age: 82
End: 2022-04-15
Attending: STUDENT IN AN ORGANIZED HEALTH CARE EDUCATION/TRAINING PROGRAM
Payer: MEDICARE

## 2022-04-15 PROCEDURE — 77334 RADIATION TREATMENT AID(S): CPT | Performed by: STUDENT IN AN ORGANIZED HEALTH CARE EDUCATION/TRAINING PROGRAM

## 2022-04-15 PROCEDURE — 77399 UNLISTED PX MED RADJ PHYSICS: CPT | Performed by: STUDENT IN AN ORGANIZED HEALTH CARE EDUCATION/TRAINING PROGRAM

## 2022-04-19 PROCEDURE — 77301 RADIOTHERAPY DOSE PLAN IMRT: CPT | Performed by: RADIOLOGY

## 2022-04-19 PROCEDURE — 77300 RADIATION THERAPY DOSE PLAN: CPT | Performed by: RADIOLOGY

## 2022-04-19 PROCEDURE — 77338 DESIGN MLC DEVICE FOR IMRT: CPT | Performed by: RADIOLOGY

## 2022-04-20 ENCOUNTER — PROCEDURE VISIT (OUTPATIENT)
Dept: NEUROSURGERY | Facility: CLINIC | Age: 82
End: 2022-04-20
Payer: MEDICARE

## 2022-04-20 ENCOUNTER — APPOINTMENT (OUTPATIENT)
Dept: RADIATION ONCOLOGY | Facility: HOSPITAL | Age: 82
End: 2022-04-20
Attending: STUDENT IN AN ORGANIZED HEALTH CARE EDUCATION/TRAINING PROGRAM
Payer: MEDICARE

## 2022-04-20 DIAGNOSIS — D33.3 VESTIBULAR SCHWANNOMA (HCC): Primary | ICD-10-CM

## 2022-04-20 PROCEDURE — 61798 SRS CRANIAL LESION COMPLEX: CPT | Performed by: NEUROLOGICAL SURGERY

## 2022-04-20 PROCEDURE — 77435 SBRT MANAGEMENT: CPT | Performed by: RADIOLOGY

## 2022-04-20 PROCEDURE — 99024 POSTOP FOLLOW-UP VISIT: CPT | Performed by: NEUROLOGICAL SURGERY

## 2022-04-20 PROCEDURE — 77373 STRTCTC BDY RAD THER TX DLVR: CPT | Performed by: RADIOLOGY

## 2022-04-20 NOTE — PROGRESS NOTES
PATIENT NAME: Ale Ying  : 1940  MRN: [id number]  PROCEDURE DATE: 2022    Stereotactic Radiotherapy (SRT) Operative Note    Preop Diagnosis: left acoustic neuroma     Postop Diagnosis: same    Procedure Details: Frameless Stereotactic Radiotherapy for left acoustic neuroma    Surgeon: Dandy Mazariegos MD, PhD     Assistants: none    No qualified resident was available to assist with this case  Radiation Oncologist(s): Dr Marco Dow    Estimated Blood Loss:  None           Specimens: None    Drains: None           Total IV Fluids: None              Findings: As above  Complications:  None    Anesthesia: None      DETAILS OF PROCEDURE    The patient presented to the outpatient area of the Department of Radiation Oncology where an open faced immobilization mask was created  The patient then underwent a stereotactic head CT while immobilized in the mask  The patient was then released from the Department  The patients stereotactic CT and previous stereotactic MRI scans were fused in the  [facility], which was used to develop the SRT plan  The SRT prescription called for a dose of 25 Gray to be delivered to the PTV in 5 fractions  The PTV was created by expanding the GTV, as contoured by myself and the Radiation Oncologist  The SRT plan utilized the mini-multileaf columnator on the TrueBeam machine at the [address] [place] [address]  When the final treatment plan had been developed and approved by myself, the radiation oncologist and physicist, the patient returned to the Department for their first frameless SRT treatment  The patient was positioned on the treatment couch  The Optic Surface Monitoring System (OSMS) was used initially to align the patient  The patient was immobilized in their open faced mask  kV and then cone beam CT imaging was used to align the patient   Once the radiation oncologist, physicist and I agreed the patient was in correct position, the fields were treated sequentially without complications  The OSMS was used during the treatment to assure correct positioning of the patient, and if it detected patient motion, interrupted the treatment beam until the patient was back in alignment  When the first SRT treatment had been delivered, the patient was recovered from the treatment room, scheduled for their remaining SRT treatments, and was discharged from the department  There was no blood loss and no specimen        SIGNATURE: Isidro Valenzuela MD, PhD  DATE: 4/20/2022   TIME: 12:15 PM

## 2022-04-22 ENCOUNTER — APPOINTMENT (OUTPATIENT)
Dept: RADIATION ONCOLOGY | Facility: HOSPITAL | Age: 82
End: 2022-04-22
Attending: STUDENT IN AN ORGANIZED HEALTH CARE EDUCATION/TRAINING PROGRAM
Payer: MEDICARE

## 2022-04-22 ENCOUNTER — APPOINTMENT (OUTPATIENT)
Dept: RADIATION ONCOLOGY | Facility: HOSPITAL | Age: 82
End: 2022-04-22
Payer: MEDICARE

## 2022-04-22 PROCEDURE — 77373 STRTCTC BDY RAD THER TX DLVR: CPT | Performed by: RADIOLOGY

## 2022-04-24 ENCOUNTER — APPOINTMENT (OUTPATIENT)
Dept: RADIATION ONCOLOGY | Facility: HOSPITAL | Age: 82
End: 2022-04-24
Payer: MEDICARE

## 2022-04-25 ENCOUNTER — APPOINTMENT (OUTPATIENT)
Dept: RADIATION ONCOLOGY | Facility: HOSPITAL | Age: 82
End: 2022-04-25
Attending: STUDENT IN AN ORGANIZED HEALTH CARE EDUCATION/TRAINING PROGRAM
Payer: MEDICARE

## 2022-04-25 PROCEDURE — 77373 STRTCTC BDY RAD THER TX DLVR: CPT | Performed by: STUDENT IN AN ORGANIZED HEALTH CARE EDUCATION/TRAINING PROGRAM

## 2022-04-27 ENCOUNTER — APPOINTMENT (OUTPATIENT)
Dept: RADIATION ONCOLOGY | Facility: HOSPITAL | Age: 82
End: 2022-04-27
Attending: STUDENT IN AN ORGANIZED HEALTH CARE EDUCATION/TRAINING PROGRAM
Payer: MEDICARE

## 2022-04-27 PROCEDURE — 77373 STRTCTC BDY RAD THER TX DLVR: CPT | Performed by: STUDENT IN AN ORGANIZED HEALTH CARE EDUCATION/TRAINING PROGRAM

## 2022-04-29 ENCOUNTER — APPOINTMENT (OUTPATIENT)
Dept: RADIATION ONCOLOGY | Facility: HOSPITAL | Age: 82
End: 2022-04-29
Payer: MEDICARE

## 2022-04-29 ENCOUNTER — APPOINTMENT (OUTPATIENT)
Dept: RADIATION ONCOLOGY | Facility: HOSPITAL | Age: 82
End: 2022-04-29
Attending: STUDENT IN AN ORGANIZED HEALTH CARE EDUCATION/TRAINING PROGRAM
Payer: MEDICARE

## 2022-04-29 PROCEDURE — 77373 STRTCTC BDY RAD THER TX DLVR: CPT | Performed by: RADIOLOGY

## 2022-04-29 PROCEDURE — 77336 RADIATION PHYSICS CONSULT: CPT | Performed by: RADIOLOGY

## 2022-05-04 ENCOUNTER — OFFICE VISIT (OUTPATIENT)
Dept: URGENT CARE | Facility: CLINIC | Age: 82
End: 2022-05-04
Payer: MEDICARE

## 2022-05-04 VITALS — HEART RATE: 76 BPM | RESPIRATION RATE: 18 BRPM | TEMPERATURE: 98.4 F | OXYGEN SATURATION: 96 %

## 2022-05-04 DIAGNOSIS — J01.90 ACUTE SINUSITIS, RECURRENCE NOT SPECIFIED, UNSPECIFIED LOCATION: Primary | ICD-10-CM

## 2022-05-04 PROCEDURE — G0463 HOSPITAL OUTPT CLINIC VISIT: HCPCS | Performed by: PHYSICIAN ASSISTANT

## 2022-05-04 PROCEDURE — 99213 OFFICE O/P EST LOW 20 MIN: CPT | Performed by: PHYSICIAN ASSISTANT

## 2022-05-04 RX ORDER — AMOXICILLIN 875 MG/1
875 TABLET, COATED ORAL 2 TIMES DAILY
Qty: 20 TABLET | Refills: 0 | Status: SHIPPED | OUTPATIENT
Start: 2022-05-04 | End: 2022-05-14

## 2022-05-04 NOTE — PROGRESS NOTES
[facility]    NAME: Randee Opitz is a [de-identified] y o  male  : 1940    MRN: [id number]  DATE: May 4, 2022  TIME: 3:16 PM    Assessment and Plan   Acute sinusitis, recurrence not specified, unspecified location [J01 90]  1  Acute sinusitis, recurrence not specified, unspecified location  amoxicillin (AMOXIL) 875 mg tablet       Patient Instructions     Patient Instructions   I have prescribed an antibiotic for the infection  Please take the antibiotic as prescribed and finish the entire prescription  I recommend that the patient takes an over the counter probiotic or eats yogurt with live cultures in it [place]) to keep good bacteria in the gut and help prevent diarrhea  Wash hands frequently to prevent the spread of infection  Can use over the counter cough and cold medications to help with symptoms  Ibuprofen and/or tylenol as needed for pain or fever  If not improving over the next 7-10 days, follow up with PCP  Chief Complaint     Chief Complaint   Patient presents with    Nasal Congestion     x4 days: nasal congestion with runny nose & c/o head cold       History of Present Illness   63-year-old male here with complaint of sinus congestion, cough and headache for the last 3-4 days  No fever chills  History of COVID in 2022  Review of Systems   Review of Systems   Constitutional: Negative for chills, fatigue and fever  HENT: Positive for congestion, postnasal drip, rhinorrhea and sinus pressure  Negative for ear pain and sore throat  Respiratory: Positive for cough  Negative for shortness of breath and wheezing  Neurological: Positive for headaches  All other systems reviewed and are negative        Current Medications     Current Outpatient Medications:     amLODIPine (NORVASC) 5 mg tablet, Take 5 mg by mouth daily, Disp: , Rfl:     apixaban (ELIQUIS) 5 mg, Take 5 mg by mouth 2 (two) times a day, Disp: , Rfl:     Ascorbic Acid (vitamin C) 100 MG tablet, Take 100 mg by mouth daily  , Disp: , Rfl:     atorvastatin (LIPITOR) 80 mg tablet, Take 1 tablet (80 mg total) by mouth every evening (Patient taking differently: Take 40 mg by mouth every evening ), Disp: 30 tablet, Rfl: 0    cholecalciferol (VITAMIN D3) 1,000 units tablet, Take 1,000 Units by mouth daily, Disp: , Rfl:     Ginger, Zingiber officinalis, (GINGER ROOT PO), Take by mouth, Disp: , Rfl:     isosorbide dinitrate (ISORDIL) 30 mg tablet, Take 30 mg by mouth daily, Disp: , Rfl:     metFORMIN (GLUCOPHAGE) 500 mg tablet, Take 500 mg by mouth 2 (two) times a day with meals , Disp: , Rfl:     metoprolol tartrate (LOPRESSOR) 25 mg tablet, Take 25 mg by mouth every 12 (twelve) hours , Disp: , Rfl:     Multiple Vitamins-Minerals (CENTRUM SILVER 50+MEN PO), Take 1 tablet by mouth daily, Disp: , Rfl:     Omega-3 Fatty Acids (FISH OIL) 1200 MG CAPS, Take 1 capsule by mouth 2 (two) times a day, Disp: , Rfl:     amoxicillin (AMOXIL) 875 mg tablet, Take 1 tablet (875 mg total) by mouth 2 (two) times a day for 10 days, Disp: 20 tablet, Rfl: 0    diazepam (VALIUM) 5 mg tablet, Take 1 tablet (5 mg total) by mouth 60 minutes pre-procedure May repeat x1 30 mins prior if needed   (Patient not taking: Reported on 4/13/2022 ), Disp: 2 tablet, Rfl: 0    LORazepam (Ativan) 0 5 mg tablet, Take 1 tablet (0 5 mg total) by mouth daily as needed for anxiety (Prior to CT simulation and Radiation treatments ) for up to 6 doses (Patient not taking: Reported on 5/4/2022 ), Disp: 6 tablet, Rfl: 0    Zinc Sulfate (ZINC 15 PO), Take by mouth (Patient not taking: Reported on 12/15/2021 ), Disp: , Rfl:     Current Allergies     Allergies as of 05/04/2022 - Reviewed 05/04/2022   Allergen Reaction Noted    Lisinopril Anaphylaxis 04/16/2012    Bacitracin Rash 08/21/2014          The following portions of the patient's history were reviewed and updated as appropriate: allergies, current medications, past family history, past medical history, past social history, past surgical history and problem list    Past Medical History:   Diagnosis Date    Coronary artery disease     Diabetes [facility] [address] )     Diabetes mellitus ([facility] [address] )     [facility]     Hyperlipidemia     Hypertension     Kidney stone     TIA (transient ischemic attack)      Past Surgical History:   Procedure Laterality Date    CARDIAC SURGERY      CORONARY ANGIOPLASTY WITH STENT PLACEMENT      REPLACEMENT TOTAL KNEE BILATERAL      SINUS SURGERY      TONSILLECTOMY       Family History   Problem Relation Age of Onset    Alzheimer's disease Mother     Pulmonary fibrosis Father      Social History     Socioeconomic History    Marital status: /Civil Union     Spouse name: Not on file    Number of children: Not on file    Years of education: Not on file    Highest education level: Not on file   Occupational History    Not on file   Tobacco Use    Smoking status: Never Smoker    Smokeless tobacco: Never Used   Vaping Use    Vaping Use: Never used   Substance and Sexual Activity    Alcohol use: Never    Drug use: Never    Sexual activity: Not on file   Other Topics Concern    Not on file   Social History Narrative    No caffeine use     Social Determinants of Health     Financial Resource Strain: Not on file   Food Insecurity: Not on file   Transportation Needs: Not on file   Physical Activity: Not on file   Stress: Not on file   Social Connections: Not on file   Intimate Partner Violence: Not on file   Housing Stability: Not on file     Medications have been verified  Objective   Pulse 76   Temp 98 4 °F (36 9 °C)   Resp 18   SpO2 96%      Physical Exam   Physical Exam  Vitals and nursing note reviewed  Constitutional:       General: He is not in acute distress  Appearance: He is well-developed  HENT:      Head: Normocephalic and atraumatic        Right Ear: Tympanic membrane normal       Left Ear: Tympanic membrane normal       Nose: Congestion and rhinorrhea present  No mucosal edema  Mouth/Throat:      Pharynx: Posterior oropharyngeal erythema present  Cardiovascular:      Rate and Rhythm: Normal rate and regular rhythm  Heart sounds: Normal heart sounds  Pulmonary:      Effort: Pulmonary effort is normal  No respiratory distress  Breath sounds: Normal breath sounds

## 2022-07-20 ENCOUNTER — RADIATION ONCOLOGY FOLLOW-UP (OUTPATIENT)
Dept: RADIATION ONCOLOGY | Facility: HOSPITAL | Age: 82
End: 2022-07-20
Attending: STUDENT IN AN ORGANIZED HEALTH CARE EDUCATION/TRAINING PROGRAM
Payer: MEDICARE

## 2022-07-20 VITALS
RESPIRATION RATE: 18 BRPM | SYSTOLIC BLOOD PRESSURE: 100 MMHG | DIASTOLIC BLOOD PRESSURE: 70 MMHG | TEMPERATURE: 97.8 F | HEART RATE: 82 BPM | WEIGHT: 310.2 LBS | OXYGEN SATURATION: 97 % | HEIGHT: 75 IN | BODY MASS INDEX: 38.57 KG/M2

## 2022-07-20 DIAGNOSIS — D33.3 VESTIBULAR SCHWANNOMA (HCC): Primary | ICD-10-CM

## 2022-07-20 PROCEDURE — 99211 OFF/OP EST MAY X REQ PHY/QHP: CPT | Performed by: STUDENT IN AN ORGANIZED HEALTH CARE EDUCATION/TRAINING PROGRAM

## 2022-07-20 PROCEDURE — 99024 POSTOP FOLLOW-UP VISIT: CPT | Performed by: STUDENT IN AN ORGANIZED HEALTH CARE EDUCATION/TRAINING PROGRAM

## 2022-07-20 NOTE — PROGRESS NOTES
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)  Treated and ultimately discharged on new cardiac medications  Currently the patient is doing fair overall  He does have some increased fatigue and ongoing dyspnea on exertion  His left-sided hearing seems to be worse today than it was prior to treatment  He does have some ongoing difficulty with balance while ambulating; not significantly changed from prior to treatment  No vertiginous symptoms  He had headaches following SRT, which have since resolved  No nausea or vomiting  Historical Information   Oncology History   Vestibular schwannoma ([facility] [address] )   2/5/2020 Initial Diagnosis    Vestibular schwannoma ([facility] [address] )     4/20/2022 - 4/29/2022 Radiation    Treatments:  Course: C1 SRT    Plan ID Energy Fractions Dose per Fraction (cGy) Dose Correction (cGy) Total Dose Delivered (cGy) Elapsed Days   [id number] 6X-FFF 5 / 5 500 0 2,500 9      Treatment Dates:  4/20/2022 - 4/29/2022            Past Medical History:   Diagnosis Date    Coronary artery disease     Diabetes ([address] )     Diabetes mellitus ([address] )     Heart attack ([address] )     Hyperlipidemia     Hypertension     Kidney stone     TIA (transient ischemic attack)      Past Surgical History:   Procedure Laterality Date    CARDIAC SURGERY      CORONARY ANGIOPLASTY WITH STENT PLACEMENT      REPLACEMENT TOTAL KNEE BILATERAL      SINUS SURGERY      TONSILLECTOMY         Social History   Social History     Substance and Sexual Activity   Alcohol Use Never     Social History     Substance and Sexual Activity   Drug Use Never     Social History     Tobacco Use   Smoking Status Never Smoker   Smokeless Tobacco Never Used         Meds/Allergies     Current Outpatient Medications:     amLODIPine (NORVASC) 5 mg tablet, Take 5 mg by mouth daily, Disp: , Rfl:     apixaban (ELIQUIS) 5 mg, Take 5 mg by mouth 2 (two) times a day, Disp: , Rfl:     atorvastatin (LIPITOR) 80 mg tablet, Take 1 tablet (80 mg total) by mouth every evening (Patient taking differently: Take 40 mg by mouth every evening), Disp: 30 tablet, Rfl: 0    cholecalciferol (VITAMIN D3) 1,000 units tablet, Take 1,000 Units by mouth daily, Disp: , Rfl:     Ginger, Zingiber officinalis, (GINGER ROOT PO), Take by mouth, Disp: , Rfl:     isosorbide dinitrate (ISORDIL) 30 mg tablet, Take 30 mg by mouth daily, Disp: , Rfl:     metFORMIN (GLUCOPHAGE) 500 mg tablet, Take 500 mg by mouth 2 (two) times a day with meals , Disp: , Rfl:     metoprolol tartrate (LOPRESSOR) 25 mg tablet, Take 25 mg by mouth every 12 (twelve) hours , Disp: , Rfl:     Multiple Vitamins-Minerals (CENTRUM SILVER 50+MEN PO), Take 1 tablet by mouth daily, Disp: , Rfl:     Omega-3 Fatty Acids (FISH OIL) 1200 MG CAPS, Take 1 capsule by mouth 2 (two) times a day, Disp: , Rfl:     Ascorbic Acid (vitamin C) 100 MG tablet, Take 100 mg by mouth daily  , Disp: , Rfl:     diazepam (VALIUM) 5 mg tablet, Take 1 tablet (5 mg total) by mouth 60 minutes pre-procedure May repeat x1 30 mins prior if needed   (Patient not taking: No sig reported), Disp: 2 tablet, Rfl: 0    LORazepam (Ativan) 0 5 mg tablet, Take 1 tablet (0 5 mg total) by mouth daily as needed for anxiety (Prior to CT simulation and Radiation treatments ) for up to 6 doses (Patient not taking: Reported on 5/4/2022 ), Disp: 6 tablet, Rfl: 0    Zinc Sulfate (ZINC 15 PO), Take by mouth, Disp: , Rfl:   Allergies   Allergen Reactions    Lisinopril Anaphylaxis     Anaphylaxis    Bacitracin Rash     Review of Systems   Constitutional: Positive for activity change (decreased), fatigue (moderate) and unexpected weight change (18 lb weight loss since April)  Negative for appetite change  HENT: Positive for hearing loss (L>R) and tinnitus (bilateral)  Negative for sinus pressure and sinus pain  Eyes: Negative  Bilateral cataract surgery     Respiratory: Negative for cough, chest tightness, shortness of breath and wheezing  Sleep apnea -    Cardiovascular: Positive for leg swelling (L>R)  Gastrointestinal: Positive for diarrhea  Endocrine: Positive for cold intolerance  Genitourinary: Positive for frequency (related to lasix) and urgency  Negative for dysuria and hematuria  Nocturia 2-3x   Musculoskeletal: Positive for arthralgias (generalized), back pain (intermittent lower back) and gait problem (unsteady at times)  Skin: Negative  Allergic/Immunologic: Positive for environmental allergies  Neurological: Positive for dizziness (bending over), speech difficulty (trouble word finding ), weakness (generalized), light-headedness and headaches (mild pressure left side of head since radiation mostly with standing)  Negative for tremors, seizures and numbness  Hematological: Bruises/bleeds easily  Psychiatric/Behavioral: Negative  Negative for confusion, decreased concentration and sleep disturbance  The patient is not nervous/anxious           Decrease in memory recall        OBJECTIVE:   /70 (BP Location: Left arm, Patient Position: Sitting, Cuff Size: Large)   Pulse 82   Temp 97 8 °F (36 6 °C) (Temporal)   Resp 18   Ht 6' 3" (1 905 m)   Wt (!) 141 kg (310 lb 3 2 oz)   SpO2 97%   BMI 38 77 kg/m²   Pain Assessment:  0  ECOG/[facility]/WHO: 2 - Symptomatic, <50% confined to bed    Physical Exam   Well-appearing  NAD  Mildly increased work of breathing on room air  Extremities warm well perfused  Mild lower extremity edema bilaterally noted  No overt neurologic deficits noted  Normal ambulation  No hair loss or skin changes noted near treatment area  RESULTS    Lab Results: No results found for this or any previous visit (from the past 672 hour(s))  Imaging Studies:No results found  Assessment/Plan:  Orders Placed This Encounter   Procedures    MRI brain IAC wo and w contrast      Approximately 2 months following completion of SRT, the patient is doing fair overall and has largely recovered from his recent treatment  He has unfortunately suffered a recent inpatient hospitalization secondary to sepsis from a urinary tract infection and complicated by a heart attack    I have recommended repeat MRI brain IAC in 4 months with plan to RTC thereafter  I will remain available should any questions arise in the interim  Zuleika Sharma MD  8/21/8407,9:79 PM    Portions of the record may have been created with voice recognition software   Occasional wrong word or "sound a like" substitutions may have occurred due to the inherent limitations of voice recognition software   Read the chart carefully and recognize, using context, where substitutions have occurred

## 2022-07-20 NOTE — PROGRESS NOTES
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)        Oncology History   Vestibular schwannoma ([facility] [address] )   2/5/2020 Initial Diagnosis    Vestibular schwannoma ([facility] [address] )     4/20/2022 - 4/29/2022 Radiation    Treatments:  Course: C1 SRT    Plan ID Energy Fractions Dose per Fraction (cGy) Dose Correction (cGy) Total Dose Delivered (cGy) Elapsed Days   SRTLtCPA 6X-FFF 5 / 5 500 0 2,500 9      Treatment Dates:  4/20/2022 - 4/29/2022  Review of Systems:  Review of Systems   Constitutional: Positive for activity change (decreased), fatigue (moderate) and unexpected weight change (18 lb weight loss since April)  Negative for appetite change  HENT: Positive for hearing loss (L>R) and tinnitus (bilateral)  Negative for sinus pressure and sinus pain  Eyes: Negative  Bilateral cataract surgery     Respiratory: Negative for cough, chest tightness, shortness of breath and wheezing  Sleep apnea -    Cardiovascular: Positive for leg swelling (L>R)  Gastrointestinal: Positive for diarrhea  Endocrine: Positive for cold intolerance  Genitourinary: Positive for frequency (related to lasix) and urgency  Negative for dysuria and hematuria  Nocturia 2-3x   Musculoskeletal: Positive for arthralgias (generalized), back pain (intermittent lower back) and gait problem (unsteady at times)  Skin: Negative      Allergic/Immunologic: Positive for environmental allergies  Neurological: Positive for dizziness (bending over), speech difficulty (trouble word finding ), weakness (generalized), light-headedness and headaches (mild pressure left side of head since radiation mostly with standing)  Negative for tremors, seizures and numbness  Hematological: Bruises/bleeds easily  Psychiatric/Behavioral: Negative  Negative for confusion, decreased concentration and sleep disturbance  The patient is not nervous/anxious           Decrease in memory recall       Clinical Trial: no    Teaching:    Covid Vaccine Status: Fully vaccinated including booster    Health Maintenance   Topic Date Due    Medicare Annual Wellness Visit (AWV)  Never done    Diabetic Foot Exam  Never done    DM Eye Exam  Never done    URINE MICROALBUMIN  Never done    BMI: Followup Plan  Never done    Fall Risk  Never done    Pneumococcal Vaccine: 65+ Years (2 - PPSV23 or PCV20) 02/17/2018    COVID-19 Vaccine (3 - Moderna risk series) 12/13/2021    Influenza Vaccine (1) 09/01/2022    HEMOGLOBIN A1C  11/09/2022    Depression Screening  04/13/2023    BMI: Adult  04/13/2023    HIB Vaccine  Aged Out    Hepatitis B Vaccine  Aged Out    IPV Vaccine  Aged Out    Hepatitis A Vaccine  Aged Out    Meningococcal ACWY Vaccine  Aged Out    HPV Vaccine  Aged Out     Patient Active Problem List   Diagnosis    History of TIA (transient ischemic attack)    Benign essential hypertension    Benign prostatic hyperplasia with urinary obstruction    Gastroesophageal reflux disease    Gout    Type 2 diabetes mellitus without complication ([address] )    Morbid obesity ([address] )    Mixed hyperlipidemia    Vestibular schwannoma ([address]    Obstructive sleep apnea (adult) (pediatric)    Left carotid stenosis    Paroxysmal atrial fibrillation ([address] )    Right arm weakness     Past Medical History:   Diagnosis Date    Coronary artery disease     Diabetes ([address]     Diabetes mellitus ([address]     Heart attack ([address] )     Hyperlipidemia     Hypertension     Kidney stone     TIA (transient ischemic attack)      Past Surgical History:   Procedure Laterality Date    CARDIAC SURGERY      CORONARY ANGIOPLASTY WITH STENT PLACEMENT      REPLACEMENT TOTAL KNEE BILATERAL      SINUS SURGERY      TONSILLECTOMY       Family History   Problem Relation Age of Onset    Alzheimer's disease Mother     Pulmonary fibrosis Father      Social History     Socioeconomic History    Marital status: /Civil Union     Spouse name: Not on file    Number of children: Not on file    Years of education: Not on file    Highest education level: Not on file   Occupational History    Not on file   Tobacco Use    Smoking status: Never Smoker    Smokeless tobacco: Never Used   Vaping Use    Vaping Use: Never used   Substance and Sexual Activity    Alcohol use: Never    Drug use: Never    Sexual activity: Not on file   Other Topics Concern    Not on file   Social History Narrative    No caffeine use     Social Determinants of Health     Financial Resource Strain: Not on file   Food Insecurity: Not on file   Transportation Needs: Not on file   Physical Activity: Not on file   Stress: Not on file   Social Connections: Not on file   Intimate Partner Violence: Not on file   Housing Stability: Not on file       Current Outpatient Medications:     amLODIPine (NORVASC) 5 mg tablet, Take 5 mg by mouth daily, Disp: , Rfl:     apixaban (ELIQUIS) 5 mg, Take 5 mg by mouth 2 (two) times a day, Disp: , Rfl:     Ascorbic Acid (vitamin C) 100 MG tablet, Take 100 mg by mouth daily  , Disp: , Rfl:     atorvastatin (LIPITOR) 80 mg tablet, Take 1 tablet (80 mg total) by mouth every evening (Patient taking differently: Take 40 mg by mouth every evening ), Disp: 30 tablet, Rfl: 0    cholecalciferol (VITAMIN D3) 1,000 units tablet, Take 1,000 Units by mouth daily, Disp: , Rfl:     diazepam (VALIUM) 5 mg tablet, Take 1 tablet (5 mg total) by mouth 60 minutes pre-procedure May repeat x1 30 mins prior if needed  (Patient not taking: Reported on 4/13/2022 ), Disp: 2 tablet, Rfl: 0    Ginger, Zingiber officinalis, (GINGER ROOT PO), Take by mouth, Disp: , Rfl:     isosorbide dinitrate (ISORDIL) 30 mg tablet, Take 30 mg by mouth daily, Disp: , Rfl:     LORazepam (Ativan) 0 5 mg tablet, Take 1 tablet (0 5 mg total) by mouth daily as needed for anxiety (Prior to CT simulation and Radiation treatments ) for up to 6 doses (Patient not taking: Reported on 5/4/2022 ), Disp: 6 tablet, Rfl: 0    metFORMIN (GLUCOPHAGE) 500 mg tablet, Take 500 mg by mouth 2 (two) times a day with meals , Disp: , Rfl:     metoprolol tartrate (LOPRESSOR) 25 mg tablet, Take 25 mg by mouth every 12 (twelve) hours , Disp: , Rfl:     Multiple Vitamins-Minerals (CENTRUM SILVER 50+MEN PO), Take 1 tablet by mouth daily, Disp: , Rfl:     Omega-3 Fatty Acids (FISH OIL) 1200 MG CAPS, Take 1 capsule by mouth 2 (two) times a day, Disp: , Rfl:     Zinc Sulfate (ZINC 15 PO), Take by mouth (Patient not taking: Reported on 12/15/2021 ), Disp: , Rfl:   Allergies   Allergen Reactions    Lisinopril Anaphylaxis     Anaphylaxis    Bacitracin Rash     There were no vitals filed for this visit

## 2022-07-21 RX ORDER — LORAZEPAM 0.5 MG/1
0.5 TABLET ORAL DAILY PRN
Qty: 3 TABLET | Refills: 0 | Status: SHIPPED | OUTPATIENT
Start: 2022-07-21

## 2022-10-25 ENCOUNTER — HOSPITAL ENCOUNTER (OUTPATIENT)
Dept: MRI IMAGING | Facility: HOSPITAL | Age: 82
Discharge: HOME | End: 2022-10-25
Attending: STUDENT IN AN ORGANIZED HEALTH CARE EDUCATION/TRAINING PROGRAM
Payer: MEDICARE

## 2022-10-25 DIAGNOSIS — D33.3 VESTIBULAR SCHWANNOMA (HCC): ICD-10-CM

## 2022-10-25 PROCEDURE — A9585 GADOBUTROL INJECTION: HCPCS | Performed by: STUDENT IN AN ORGANIZED HEALTH CARE EDUCATION/TRAINING PROGRAM

## 2022-10-25 PROCEDURE — G1004 CDSM NDSC: HCPCS

## 2022-10-25 PROCEDURE — 70553 MRI BRAIN STEM W/O & W/DYE: CPT

## 2022-10-25 RX ADMIN — GADOBUTROL 14 ML: 604.72 INJECTION INTRAVENOUS at 19:06

## 2022-10-27 ENCOUNTER — DOCUMENTATION (OUTPATIENT)
Dept: RADIATION ONCOLOGY | Facility: HOSPITAL | Age: 82
End: 2022-10-27

## 2022-10-27 NOTE — PROGRESS NOTES
Radiology called for read on 10/25/22 MRI brain IAC in preparation for patient's appt at [facility] 11/2/22

## 2022-11-02 ENCOUNTER — CLINICAL SUPPORT (OUTPATIENT)
Dept: RADIATION ONCOLOGY | Facility: HOSPITAL | Age: 82
End: 2022-11-02
Attending: STUDENT IN AN ORGANIZED HEALTH CARE EDUCATION/TRAINING PROGRAM

## 2022-11-02 VITALS
SYSTOLIC BLOOD PRESSURE: 118 MMHG | HEIGHT: 75 IN | TEMPERATURE: 97.1 F | WEIGHT: 309 LBS | OXYGEN SATURATION: 98 % | DIASTOLIC BLOOD PRESSURE: 80 MMHG | BODY MASS INDEX: 38.42 KG/M2 | RESPIRATION RATE: 18 BRPM | HEART RATE: 75 BPM

## 2022-11-02 DIAGNOSIS — D33.3 VESTIBULAR SCHWANNOMA (HCC): Primary | ICD-10-CM

## 2022-11-02 RX ORDER — ASPIRIN 325 MG
2 TABLET ORAL DAILY
COMMUNITY

## 2022-11-02 RX ORDER — LORAZEPAM 0.5 MG/1
TABLET ORAL
Qty: 3 TABLET | Refills: 0 | Status: SHIPPED | OUTPATIENT
Start: 2022-11-02

## 2022-11-02 NOTE — PROGRESS NOTES
Follow-up - Radiation Oncology   Rupert Dockery 1940 [de-identified] y o  male [id number]      History of Present Illness   Cancer Staging  No matching staging information was found for the patient  Follow up visit   Mr Annika Rocha is an [de-identified]year old man with a left vestibular schwannoma initially observed with repeat imaging showing progressive enlargement over serial scans  Given the size and location of the tumor adjacent to the brainstem he was recommended for definitive SRT  On 4/29/22 he completed a course of definitive SRT to a dose of 2,500 cGy in 5 fractions  He was last seen in radiation oncology on 7/20/22  He returns today for follow up and repeat imaging          10/25/22 MRI brain IAC w wo contrast  The contrast injection extravasated into the antecubital fossa and only a small amount of contrast enhancement is present      Left CP angle mass has slightly decreased in size compared to the prior examination with continued mass effect upon the brachium pontis and left lateral cindy        Currently the patient is doing well overall  He has baseline hearing loss from the left ear which is unchanged and does have some vertiginous symptoms; it is unclear if these symptoms are worse or better from prior to treatment though they may be occurring less commonly  He has some intermittent headaches, though it seems as though these are also slightly improved  Historical Information   Oncology History   Vestibular schwannoma ([facility] [address] )   2/5/2020 Initial Diagnosis    Vestibular schwannoma ([facility] [address] )     4/20/2022 - 4/29/2022 Radiation    Treatments:  Course: C1 SRT    Plan ID Energy Fractions Dose per Fraction (cGy) Dose Correction (cGy) Total Dose Delivered (cGy) Elapsed Days   SRTLtCPA 6X-FFF 5 / 5 500 0 2,500 9      Treatment Dates:  4/20/2022 - 4/29/2022            Past Medical History:   Diagnosis Date   • Coronary artery disease    • Diabetes ([address] )    • Diabetes mellitus ([address] )    • Heart attack ([address] )    • Hyperlipidemia    • Hypertension    • Kidney stone    • TIA (transient ischemic attack)      Past Surgical History:   Procedure Laterality Date   • CARDIAC SURGERY     • CORONARY ANGIOPLASTY WITH STENT PLACEMENT     • REPLACEMENT TOTAL KNEE BILATERAL     • SINUS SURGERY     • TONSILLECTOMY         Social History   Social History     Substance and Sexual Activity   Alcohol Use Never     Social History     Substance and Sexual Activity   Drug Use Never     Social History     Tobacco Use   Smoking Status Never Smoker   Smokeless Tobacco Never Used         Meds/Allergies     Current Outpatient Medications:   •  amLODIPine (NORVASC) 5 mg tablet, Take 5 mg by mouth daily, Disp: , Rfl:   •  apixaban (ELIQUIS) 5 mg, Take 5 mg by mouth 2 (two) times a day, Disp: , Rfl:   •  atorvastatin (LIPITOR) 80 mg tablet, Take 1 tablet (80 mg total) by mouth every evening (Patient taking differently: Take 40 mg by mouth every evening), Disp: 30 tablet, Rfl: 0  •  cholecalciferol (VITAMIN D3) 1,000 units tablet, Take 1,000 Units by mouth daily, Disp: , Rfl:   •  Ginger, Zingiber officinalis, (GINGER ROOT PO), Take by mouth, Disp: , Rfl:   •  isosorbide dinitrate (ISORDIL) 30 mg tablet, Take 30 mg by mouth daily, Disp: , Rfl:   •  LORazepam (Ativan) 0 5 mg tablet, Take 1 tab 1 hour prior to MRI for anxiety as needed  Repeat at 30 mins prior to MRI if needed  , Disp: 3 tablet, Rfl: 0  •  metFORMIN (GLUCOPHAGE) 500 mg tablet, Take 500 mg by mouth 2 (two) times a day with meals , Disp: , Rfl:   •  metoprolol tartrate (LOPRESSOR) 25 mg tablet, Take 25 mg by mouth every 12 (twelve) hours , Disp: , Rfl:   •  Multiple Vitamins-Minerals (Multivitamin Gummies Adult) CHEW, Chew 2 tablets daily, Disp: , Rfl:   •  Omega-3 Fatty Acids (FISH OIL) 1200 MG CAPS, Take 1 capsule by mouth 2 (two) times a day, Disp: , Rfl:   •  Ascorbic Acid (vitamin C) 100 MG tablet, Take 100 mg by mouth daily  , Disp: , Rfl:   •  diazepam (VALIUM) 5 mg tablet, Take 1 tablet (5 mg total) by mouth 60 minutes pre-procedure May repeat x1 30 mins prior if needed  (Patient not taking: No sig reported), Disp: 2 tablet, Rfl: 0  •  Multiple Vitamins-Minerals (CENTRUM SILVER 50+MEN PO), Take 1 tablet by mouth daily, Disp: , Rfl:   •  Zinc Sulfate (ZINC 15 PO), Take by mouth, Disp: , Rfl:   Allergies   Allergen Reactions   • Lisinopril Anaphylaxis     Anaphylaxis   • Bacitracin Rash     Review of Systems   Constitutional: Positive for activity change (decreased) and fatigue (moderate)  Negative for appetite change  HENT: Positive for hearing loss (L>R) and tinnitus (bilateral)  Negative for sinus pressure and sinus pain  Eyes: Negative  Bilateral cataract surgery     Respiratory: Negative for cough, chest tightness, shortness of breath and wheezing  Sleep apnea - does not wear CPAP   Cardiovascular: Positive for leg swelling (L>R)  Gastrointestinal: Negative  Endocrine: Positive for cold intolerance  Genitourinary: Positive for frequency (related to lasix) and urgency  Negative for dysuria and hematuria  Nocturia 2-3x  Mild leakage   Musculoskeletal: Positive for arthralgias (generalized), back pain (intermittent lower back) and gait problem (unsteady at times uses cane)  Skin: Positive for color change (lower legs)  Allergic/Immunologic: Positive for environmental allergies  Neurological: Positive for dizziness (bending over), speech difficulty (trouble word finding ), weakness (generalized), light-headedness and headaches (mild pressure left side of head )  Negative for tremors, seizures and numbness  Vertigo   Hematological: Bruises/bleeds easily  Psychiatric/Behavioral: Negative  Negative for confusion, decreased concentration and sleep disturbance  The patient is not nervous/anxious           Decrease in memory recall        OBJECTIVE:   /80 (BP Location: Left arm, Patient Position: Sitting, Cuff Size: Large)   Pulse 75   Temp (!) 97 1 °F (36 2 °C) (Temporal)   Resp 18   Ht 6' 3" (1 905 m)   Wt (!) 140 kg (309 lb)   SpO2 98%   BMI 38 62 kg/m²   Pain Assessment:  0  ECOG/Zubrod/WHO: 2 - Symptomatic, <50% confined to bed    Physical Exam   Well-appearing  NAD  Mildly increased work of breathing on room air  Extremities warm well perfused  Mild lower extremity edema bilaterally noted  No overt neurologic deficits noted  Normal ambulation  No hair loss or skin changes noted near treatment area               RESULTS    Lab Results: No results found for this or any previous visit (from the past 672 hour(s))  Imaging Studies:MRI brain IAC wo and w contrast    Result Date: 10/28/2022  Narrative: MRI BRAIN AND IAC'S -  WITH AND WITHOUT CONTRAST INDICATION: D33 3: Benign neoplasm of cranial nerves  COMPARISON:  None  TECHNIQUE: Brain:   Sagittal T1, axial T2, axial Slaughter, axial T1, axial FLAIR, axial diffusion imaging  Axial T1 postcontrast   Axial BRAVO post contrast  IAC'S:  Coronal FIESTA, coronal T1 postcontrast, axial T1 postcontrast with fat suppression  Targeted images of the IAC'S were performed requiring additional time at acquisition and interpretation of approximately 25% IV Contrast:  14 mL of Gadobutrol injection (SINGLE-DOSE)  Unfortunately the injection of contrast extravasated there may be minimal intravascular contrast   However, there is very poor enhancement of the intracranial structures indicating most of the contrast extravasated into the soft tissues  IMAGE QUALITY:   Diagnostic  FINDINGS: BRAIN PARENCHYMA:  There is no discrete mass, mass effect or midline shift  Brainstem and cerebellum demonstrate normal signal  There is no intracranial hemorrhage  There is no evidence of acute infarction and diffusion imaging is unremarkable  Minimal white matter change within the supratentorial brain parenchyma and within the central aspect of the cindy consistent with chronic microangiopathic change   Essentially nondiagnostic postcontrast imaging due to extravasation of contrast  IAC'S:  There is a heterogeneous mass identified within the left CP angle  Unfortunately the lack of contrast enhancement makes it difficult to compare  Comparing axial T2 and axial fiesta imaging, the mass is similar in size to the prior exam measuring approximately 2 7 cm in long axis and 2 2 cm in short axis extending from the CP angle into the internal auditory canal   On coronal fiesta imaging the mass measures approximately 7 cm in craniocaudad dimension which is slightly smaller than the prior examination  There is a cystic component to this mass along the posterior superior aspect of the mass which is similar in size  Continued mass effect upon the left cindy and brachium pontis without edema  VENTRICLES:  Normal  SELLA AND PITUITARY GLAND:  Normal  ORBITS:  Normal  PARANASAL SINUSES:  Normal  VASCULATURE:  Evaluation of the major intracranial vasculature demonstrates appropriate flow voids  CALVARIUM AND SKULL BASE:  Normal  EXTRACRANIAL SOFT TISSUES:  Normal      Impression: The contrast injection extravasated into the antecubital fossa and only a small amount of contrast enhancement is present  Left CP angle mass has slightly decreased in size compared to the prior examination with continued mass effect upon the brachium pontis and left lateral cindy  Workstation performed: [id number]           Assessment/Plan:  Orders Placed This Encounter   Procedures   • MRI brain IAC wo and w contrast   • BUN   • Creatinine, serum      We reviewed the patient's posttreatment MRI brain IAC  Due to extravasation of contrast, there was some difficulty in comparing this with his pre treatment imaging studies, though it does appear that his left CP angle vestibular schwannoma has decreased in size, at least in the craniocaudal direction as seen on Fiesta imaging    I recommend repeat MRI brain in 6 months for continued surveillance with RTC thereafter  Rodrisaeedadalberto Barba  11/2/2022,5:22 PM    Portions of the record may have been created with voice recognition software   Occasional wrong word or "sound a like" substitutions may have occurred due to the inherent limitations of voice recognition software   Read the chart carefully and recognize, using context, where substitutions have occurred

## 2022-11-02 NOTE — PROGRESS NOTES
Bhavin Carbajal 1940 is a [de-identified] y o  male     Follow up visit   Mr Anne Castillo is an [de-identified]year old man with a left vestibular schwannoma initially observed with repeat imaging showing progressive enlargement over serial scans  Given the size and location of the tumor adjacent to the brainstem he was recommended for definitive SRT  On 4/29/22 he completed a course of definitive SRT to a dose of 2,500 cGy in 5 fractions  He was last seen in radiation oncology on 7/20/22  He returns today for follow up and repeat imaging  10/25/22 MRI brain IAC w wo contrast  The contrast injection extravasated into the antecubital fossa and only a small amount of contrast enhancement is present  Left CP angle mass has slightly decreased in size compared to the prior examination with continued mass effect upon the brachium pontis and left lateral cindy  Oncology History   Vestibular schwannoma ([address] )   2/5/2020 Initial Diagnosis    Vestibular schwannoma ([address] )     4/20/2022 - 4/29/2022 Radiation    Treatments:  Course: C1 SRT    Plan ID Energy Fractions Dose per Fraction (cGy) Dose Correction (cGy) Total Dose Delivered (cGy) Elapsed Days   SRTLtCPA 6X-FFF 5 / 5 500 0 2,500 9      Treatment Dates:  4/20/2022 - 4/29/2022  Review of Systems:  Review of Systems   Constitutional: Positive for activity change (decreased) and fatigue (moderate)  Negative for appetite change  HENT: Positive for hearing loss (L>R) and tinnitus (bilateral)  Negative for sinus pressure and sinus pain  Eyes: Negative  Bilateral cataract surgery     Respiratory: Negative for cough, chest tightness, shortness of breath and wheezing  Sleep apnea - does not wear CPAP   Cardiovascular: Positive for leg swelling (L>R)  Gastrointestinal: Negative  Endocrine: Positive for cold intolerance  Genitourinary: Positive for frequency (related to lasix) and urgency  Negative for dysuria and hematuria          Nocturia 2-3x  Mild leakage   Musculoskeletal: Positive for arthralgias (generalized), back pain (intermittent lower back) and gait problem (unsteady at times uses cane)  Skin: Positive for color change (lower legs)  Allergic/Immunologic: Positive for environmental allergies  Neurological: Positive for dizziness (bending over), speech difficulty (trouble word finding ), weakness (generalized), light-headedness and headaches (mild pressure left side of head )  Negative for tremors, seizures and numbness  Vertigo   Hematological: Bruises/bleeds easily  Psychiatric/Behavioral: Negative  Negative for confusion, decreased concentration and sleep disturbance  The patient is not nervous/anxious           Decrease in memory recall       Clinical Trial: no      Teaching:     Covid Vaccine Status: Fully vaccinated including booster    Health Maintenance   Topic Date Due   • Medicare Annual Wellness Visit (AWV)  Never done   • Diabetic Foot Exam  Never done   • DM Eye Exam  Never done   • URINE MICROALBUMIN  Never done   • BMI: Followup Plan  Never done   • Fall Risk  Never done   • Pneumococcal Vaccine: 65+ Years (2 - PPSV23 or PCV20) 02/17/2018   • COVID-19 Vaccine (3 - Moderna risk series) 12/13/2021   • Influenza Vaccine (1) 09/01/2022   • HEMOGLOBIN A1C  11/09/2022   • Depression Screening  07/20/2023   • BMI: Adult  07/20/2023   • HIB Vaccine  Aged Out   • Hepatitis B Vaccine  Aged Out   • IPV Vaccine  Aged Out   • Hepatitis A Vaccine  Aged Out   • Meningococcal ACWY Vaccine  Aged Out   • HPV Vaccine  Aged Out     Patient Active Problem List   Diagnosis   • History of TIA (transient ischemic attack)   • Benign essential hypertension   • Benign prostatic hyperplasia with urinary obstruction   • Gastroesophageal reflux disease   • Gout   • Type 2 diabetes mellitus without complication (HCC)   • Morbid obesity ([address] )   • Mixed hyperlipidemia   • Vestibular schwannoma (HCC)   • Obstructive sleep apnea (adult) (pediatric)   • Left carotid stenosis   • Paroxysmal atrial fibrillation (HCC)   • Right arm weakness     Past Medical History:   Diagnosis Date   • Coronary artery disease    • Diabetes ([facility] [address] )    • Diabetes mellitus ([facility] [address] )    • [facility] attack [facility]    • Hyperlipidemia    • Hypertension    • Kidney stone    • TIA (transient ischemic attack)      Past Surgical History:   Procedure Laterality Date   • CARDIAC SURGERY     • CORONARY ANGIOPLASTY WITH STENT PLACEMENT     • REPLACEMENT TOTAL KNEE BILATERAL     • SINUS SURGERY     • TONSILLECTOMY       Family History   Problem Relation Age of Onset   • Alzheimer's disease Mother    • Pulmonary fibrosis Father      Social History     Socioeconomic History   • Marital status: /Civil Union     Spouse name: Not on file   • Number of children: Not on file   • Years of education: Not on file   • Highest education level: Not on file   Occupational History   • Not on file   Tobacco Use   • Smoking status: Never Smoker   • Smokeless tobacco: Never Used   Vaping Use   • Vaping Use: Never used   Substance and Sexual Activity   • Alcohol use: Never   • Drug use: Never   • Sexual activity: Not on file   Other Topics Concern   • Not on file   Social History Narrative    No caffeine use     Social Determinants of Health     Financial Resource Strain: Not on file   Food Insecurity: Not on file   Transportation Needs: Not on file   Physical Activity: Not on file   Stress: Not on file   Social Connections: Not on file   Intimate Partner Violence: Not on file   Housing Stability: Not on file       Current Outpatient Medications:   •  amLODIPine (NORVASC) 5 mg tablet, Take 5 mg by mouth daily, Disp: , Rfl:   •  apixaban (ELIQUIS) 5 mg, Take 5 mg by mouth 2 (two) times a day, Disp: , Rfl:   •  Ascorbic Acid (vitamin C) 100 MG tablet, Take 100 mg by mouth daily  , Disp: , Rfl:   •  atorvastatin (LIPITOR) 80 mg tablet, Take 1 tablet (80 mg total) by mouth every evening (Patient taking differently: Take 40 mg by mouth every evening), Disp: 30 tablet, Rfl: 0  •  cholecalciferol (VITAMIN D3) 1,000 units tablet, Take 1,000 Units by mouth daily, Disp: , Rfl:   •  diazepam (VALIUM) 5 mg tablet, Take 1 tablet (5 mg total) by mouth 60 minutes pre-procedure May repeat x1 30 mins prior if needed  (Patient not taking: No sig reported), Disp: 2 tablet, Rfl: 0  •  Ginger, Zingiber officinalis, (GINGER ROOT PO), Take by mouth, Disp: , Rfl:   •  isosorbide dinitrate (ISORDIL) 30 mg tablet, Take 30 mg by mouth daily, Disp: , Rfl:   •  LORazepam (Ativan) 0 5 mg tablet, Take 1 tablet (0 5 mg total) by mouth daily as needed for anxiety (Prior to MRI for anxiety as needed) for up to 3 doses, Disp: 3 tablet, Rfl: 0  •  metFORMIN (GLUCOPHAGE) 500 mg tablet, Take 500 mg by mouth 2 (two) times a day with meals , Disp: , Rfl:   •  metoprolol tartrate (LOPRESSOR) 25 mg tablet, Take 25 mg by mouth every 12 (twelve) hours , Disp: , Rfl:   •  Multiple Vitamins-Minerals (CENTRUM SILVER 50+MEN PO), Take 1 tablet by mouth daily, Disp: , Rfl:   •  Omega-3 Fatty Acids (FISH OIL) 1200 MG CAPS, Take 1 capsule by mouth 2 (two) times a day, Disp: , Rfl:   •  Zinc Sulfate (ZINC 15 PO), Take by mouth, Disp: , Rfl:   Allergies   Allergen Reactions   • Lisinopril Anaphylaxis     Anaphylaxis   • Bacitracin Rash     There were no vitals filed for this visit

## 2023-01-22 ENCOUNTER — OFFICE VISIT (OUTPATIENT)
Dept: URGENT CARE | Facility: CLINIC | Age: 83
End: 2023-01-22

## 2023-01-22 VITALS
BODY MASS INDEX: 37.93 KG/M2 | RESPIRATION RATE: 16 BRPM | TEMPERATURE: 97.7 F | OXYGEN SATURATION: 97 % | SYSTOLIC BLOOD PRESSURE: 139 MMHG | HEART RATE: 74 BPM | WEIGHT: 303.5 LBS | DIASTOLIC BLOOD PRESSURE: 81 MMHG

## 2023-01-22 DIAGNOSIS — R30.0 DYSURIA: Primary | ICD-10-CM

## 2023-01-22 LAB
SL AMB  POCT GLUCOSE, UA: ABNORMAL
SL AMB LEUKOCYTE ESTERASE,UA: ABNORMAL
SL AMB POCT BILIRUBIN,UA: ABNORMAL
SL AMB POCT BLOOD,UA: ABNORMAL
SL AMB POCT CLARITY,UA: CLEAR
SL AMB POCT COLOR,UA: YELLOW
SL AMB POCT KETONES,UA: ABNORMAL
SL AMB POCT NITRITE,UA: ABNORMAL
SL AMB POCT PH,UA: 6
SL AMB POCT SPECIFIC GRAVITY,UA: 1
SL AMB POCT URINE PROTEIN: 30
SL AMB POCT UROBILINOGEN: 0.2

## 2023-01-22 RX ORDER — SULFAMETHOXAZOLE AND TRIMETHOPRIM 800; 160 MG/1; MG/1
1 TABLET ORAL EVERY 12 HOURS SCHEDULED
Qty: 10 TABLET | Refills: 0 | Status: SHIPPED | OUTPATIENT
Start: 2023-01-22 | End: 2023-01-27

## 2023-01-22 RX ORDER — FUROSEMIDE 20 MG/1
20 TABLET ORAL EVERY OTHER DAY
COMMUNITY
Start: 2022-10-22

## 2023-01-22 NOTE — PROGRESS NOTES
[facility] Now        NAME: Dick Hunt  is a [de-identified] y o  male  : 1940    MRN: [id number]  DATE: 2023  TIME: 12:53 PM    Assessment and Plan   Dysuria [R30 0]  1  Dysuria  POCT urine dip    sulfamethoxazole-trimethoprim (BACTRIM DS) 800-160 mg per tablet        Discussed, patient  Urine dip showed no concerns for UTI today  Discussed this with patient and states he still some feels symptomatic for 1  Prescribing Bactrim for coverage and advised the patient to watch out for worsening symptoms such as as increased pain, fevers, chills, lethargy, back pain, difficulty urinating  Patient has an upcoming urology appointment and should discuss with urologist   Should be pushing fluids and follow-up with PCP as needed  Patient Instructions       Follow up with PCP in 3-5 days  Proceed to  ER if symptoms worsen  Chief Complaint     Chief Complaint   Patient presents with   • Possible UTI     2-3 days burning when urinating  Denies back pain or abd pain  No fever or chills  History of Present Illness       28-year-old male presents with 2 days of dysuria, urinary frequency, and urinary urgency  Patient states every time he gets up from his chair he feels the urge to urinate  Also having burning sensation when urinating  Denies any fevers or chills or nausea or vomiting  Denies any back pain  Has had UTIs in the past and was even hospitalized with pyelonephritis before  Denies any of those symptoms but decided to be seen because he does not want to develop any complications  Eating and drinking normally  Review of Systems   Review of Systems   Constitutional: Negative for appetite change, chills, fatigue and fever  Respiratory: Negative for cough, shortness of breath, wheezing and stridor  Cardiovascular: Negative for chest pain and palpitations  Gastrointestinal: Negative for abdominal pain, constipation, diarrhea, nausea and vomiting     Genitourinary: Positive for dysuria, frequency (on diuretic) and urgency  Negative for difficulty urinating, flank pain, hematuria, penile discharge and penile pain  Current Medications       Current Outpatient Medications:   •  amLODIPine (NORVASC) 5 mg tablet, Take 5 mg by mouth daily, Disp: , Rfl:   •  apixaban (ELIQUIS) 5 mg, Take 5 mg by mouth 2 (two) times a day, Disp: , Rfl:   •  Ascorbic Acid (vitamin C) 100 MG tablet, Take 100 mg by mouth daily  , Disp: , Rfl:   •  atorvastatin (LIPITOR) 80 mg tablet, Take 1 tablet (80 mg total) by mouth every evening (Patient taking differently: Take 40 mg by mouth every evening), Disp: 30 tablet, Rfl: 0  •  cholecalciferol (VITAMIN D3) 1,000 units tablet, Take 1,000 Units by mouth daily, Disp: , Rfl:   •  furosemide (LASIX) 20 mg tablet, Take 20 mg by mouth every other day, Disp: , Rfl:   •  Ginger, Zingiber officinalis, (GINGER ROOT PO), Take by mouth, Disp: , Rfl:   •  isosorbide dinitrate (ISORDIL) 30 mg tablet, Take 30 mg by mouth daily, Disp: , Rfl:   •  LORazepam (Ativan) 0 5 mg tablet, Take 1 tab 1 hour prior to MRI for anxiety as needed  Repeat at 30 mins prior to MRI if needed  , Disp: 3 tablet, Rfl: 0  •  metFORMIN (GLUCOPHAGE) 500 mg tablet, Take 500 mg by mouth 2 (two) times a day with meals , Disp: , Rfl:   •  metoprolol tartrate (LOPRESSOR) 25 mg tablet, Take 25 mg by mouth every 12 (twelve) hours , Disp: , Rfl:   •  Multiple Vitamins-Minerals (CENTRUM SILVER 50+MEN PO), Take 1 tablet by mouth daily, Disp: , Rfl:   •  Multiple Vitamins-Minerals (Multivitamin Gummies Adult) CHEW, Chew 2 tablets daily, Disp: , Rfl:   •  Omega-3 Fatty Acids (FISH OIL) 1200 MG CAPS, Take 1 capsule by mouth 2 (two) times a day, Disp: , Rfl:   •  sulfamethoxazole-trimethoprim (BACTRIM DS) 800-160 mg per tablet, Take 1 tablet by mouth every 12 (twelve) hours for 5 days, Disp: 10 tablet, Rfl: 0  •  Zinc Sulfate (ZINC 15 PO), Take by mouth, Disp: , Rfl:   •  diazepam (VALIUM) 5 mg tablet, Take 1 tablet (5 mg total) by mouth 60 minutes pre-procedure May repeat x1 30 mins prior if needed  (Patient not taking: Reported on 1/22/2023), Disp: 2 tablet, Rfl: 0    Current Allergies     Allergies as of 01/22/2023 - Reviewed 01/22/2023   Allergen Reaction Noted   • Lisinopril Anaphylaxis 04/16/2012   • Bacitracin Rash 08/21/2014            The following portions of the patient's history were reviewed and updated as appropriate: allergies, current medications, past family history, past medical history, past social history, past surgical history and problem list      Past Medical History:   Diagnosis Date   • Coronary artery disease    • Diabetes ([facility] [address] )    • Diabetes mellitus ([facility] [address] )    • Heart attack ([facility] [address] )    • Hyperlipidemia    • Hypertension    • Kidney stone    • TIA (transient ischemic attack)        Past Surgical History:   Procedure Laterality Date   • CARDIAC SURGERY     • CORONARY ANGIOPLASTY WITH STENT PLACEMENT     • REPLACEMENT TOTAL KNEE BILATERAL     • SINUS SURGERY     • TONSILLECTOMY         Family History   Problem Relation Age of Onset   • Alzheimer's disease Mother    • Pulmonary fibrosis Father          Medications have been verified  Objective   /81   Pulse 74   Temp 97 7 °F (36 5 °C)   Resp 16   Wt (!) 138 kg (303 lb 8 oz)   SpO2 97%   BMI 37 93 kg/m²        Physical Exam     Physical Exam  Vitals and nursing note reviewed  Constitutional:       General: He is not in acute distress  Appearance: Normal appearance  He is normal weight  He is not ill-appearing, toxic-appearing or diaphoretic  HENT:      Head: Normocephalic  Cardiovascular:      Rate and Rhythm: Normal rate and regular rhythm  Pulses: Normal pulses  Heart sounds: Normal heart sounds  No murmur heard  No friction rub  No gallop  Pulmonary:      Effort: Pulmonary effort is normal  No respiratory distress  Breath sounds: Normal breath sounds  No stridor  No wheezing, rhonchi or rales  Chest:      Chest wall: No tenderness  Abdominal:      General: Abdomen is flat  Bowel sounds are normal  There is no distension  Palpations: Abdomen is soft  There is no mass  Tenderness: There is no abdominal tenderness  There is no right CVA tenderness, left CVA tenderness, guarding or rebound  Hernia: No hernia is present  Musculoskeletal:         General: No swelling, tenderness or signs of injury  Normal range of motion  Skin:     General: Skin is warm and dry  Capillary Refill: Capillary refill takes less than 2 seconds  Coloration: Skin is not jaundiced or pale  Findings: No erythema  Neurological:      General: No focal deficit present  Mental Status: He is alert and oriented to person, place, and time     Psychiatric:         Mood and Affect: Mood normal          Behavior: Behavior normal

## 2023-01-23 LAB — BACTERIA UR CULT: NORMAL

## 2023-09-08 ENCOUNTER — TELEPHONE (OUTPATIENT)
Dept: RADIATION ONCOLOGY | Facility: HOSPITAL | Age: 83
End: 2023-09-08

## 2023-09-08 NOTE — TELEPHONE ENCOUNTER
Received a call from the patient. At this time the patient is asymptomatic and doing well. He additionally has increased stress due to serious illness in his wife. Based on this he would like to reschedule his next MRI until at least 6 months in the future. He is aware that this may lead to failure to detect growth, but that overall the likelihood of local control is quite high. We will cancel his upcoming appointment and reach out to him in 6 months to reschedule.      Meng Javier MD  Dept of Radiation Oncology

## 2024-03-20 ENCOUNTER — TELEPHONE (OUTPATIENT)
Dept: RADIATION ONCOLOGY | Facility: HOSPITAL | Age: 84
End: 2024-03-20

## 2024-03-20 NOTE — TELEPHONE ENCOUNTER
LM requesting call back to see if patient is ready to reschedule MRI brain IAC and radiation follow up with Dr Mahesh Ocasio. My contact phone number and the radiation office phone provided.

## 2024-03-22 ENCOUNTER — TELEPHONE (OUTPATIENT)
Facility: HOSPITAL | Age: 84
End: 2024-03-22

## 2024-03-22 NOTE — TELEPHONE ENCOUNTER
3/22/24 1001 Teams VM message:  My Name is Ajay Montilla, February 27th, 1940. You gave me a call about the radiation and and to check on the tumor I have the vestibular Marshall. Give me a call back at [phone]. Thank you. Bye.    Returned call to patient regarding scheduling MRI brain IAC and radiation oncology follow up appointments. Patient states that his wife passed away Jan 26, 2024. 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 He uses a cane with ambulating for balance. He has no headaches/pain. His left ear hearing los is unchanged and states that he is aware that his hearing would not improve after the radiation therapy. He reports that he is very claustrophobic and is a very difficult IV stick. He states that after the last MRI brain he would never go through that again. NN discussed with patient the option of having IV sedation versus general anesthesia with his next MRI, he said that maybe he would consider that as a option. Patient informed that I would notify Dr Ocasio of his concerns.

## 2024-03-26 ENCOUNTER — TELEPHONE (OUTPATIENT)
Dept: RADIATION ONCOLOGY | Facility: HOSPITAL | Age: 84
End: 2024-03-26

## 2024-03-26 NOTE — TELEPHONE ENCOUNTER
Received a message from our our neuro-oncology nurse navigator Sofia Lowe RN, that the patient would like to defer his recommended MRI brain.  I placed a call to the patient to discuss further.    Per patient, he had significant difficulty with prior MRI due to difficulty with IV insertion as well as significant claustrophobia.  He indicated that further MRIs would likely require sedation or anesthesia to be done successfully.  We discussed the utility of surveillance MRI testing given his prior treatment, improvement in symptoms, and initial good response on MRI.  In particular, we discussed that the benefit of surveillance imaging would be for early detection of tumor regrowth, which may allow for more effective salvage options.  Unfortunately in this setting the primary salvage option for the patient would be surgical excision/resection, which would likely not be feasible given his advanced age and significant comorbid medical conditions.  As a result, early detection of tumor regrowth/progression might not offer substantial benefit over progression at time of symptom onset.    Acknowledging this, we discussed options going forward to include:  (1) Watchful observation without imaging  (2) MRI surveillance performed with anesthesia/sedation  (3) Surveillance CT Head/Neck imaging performed with thin cuts and IV contrast; while not optimal from an imaging perspective this approach would be much easier for the patient given his claustrophobia.     After consideration of his options the patient would like to proceed with option 1, watchful observation without imaging.  The patient has our contact information and plans to call us back if he were to develop any new or worsening symptoms potentially attributable to his tumor.  I remain available as needed.    Jeovany Ocasio MD  Dept of Radiation Oncology

## 2024-12-22 ENCOUNTER — OFFICE VISIT (OUTPATIENT)
Dept: URGENT CARE | Facility: CLINIC | Age: 84
End: 2024-12-22
Payer: MEDICARE

## 2024-12-22 VITALS
RESPIRATION RATE: 18 BRPM | TEMPERATURE: 97.7 F | OXYGEN SATURATION: 99 % | SYSTOLIC BLOOD PRESSURE: 130 MMHG | HEIGHT: 75 IN | DIASTOLIC BLOOD PRESSURE: 80 MMHG | HEART RATE: 82 BPM | BODY MASS INDEX: 37.92 KG/M2 | WEIGHT: 305 LBS

## 2024-12-22 DIAGNOSIS — J06.9 VIRAL UPPER RESPIRATORY TRACT INFECTION: Primary | ICD-10-CM

## 2024-12-22 PROCEDURE — 99213 OFFICE O/P EST LOW 20 MIN: CPT | Performed by: FAMILY MEDICINE

## 2024-12-22 PROCEDURE — G0463 HOSPITAL OUTPT CLINIC VISIT: HCPCS | Performed by: FAMILY MEDICINE

## 2024-12-22 RX ORDER — TAMSULOSIN HYDROCHLORIDE 0.4 MG/1
0.4 CAPSULE ORAL
COMMUNITY
Start: 2024-04-23

## 2024-12-22 RX ORDER — AZITHROMYCIN 250 MG/1
TABLET, FILM COATED ORAL
Qty: 6 TABLET | Refills: 0 | Status: SHIPPED | OUTPATIENT
Start: 2024-12-22 | End: 2024-12-26

## 2024-12-22 RX ORDER — HYDRALAZINE HYDROCHLORIDE 25 MG/1
25 TABLET, FILM COATED ORAL
COMMUNITY
Start: 2024-04-22

## 2024-12-22 RX ORDER — SPIRONOLACTONE 25 MG/1
25 TABLET ORAL
COMMUNITY
Start: 2024-10-10

## 2024-12-22 NOTE — PATIENT INSTRUCTIONS
Plain Mucinex/guaifenesin to help decrease and thin the mucus.  You can continue the Chloraseptic spray, or try Cepacol throat lozenges for the sore throat.  Monitor temperature.  If he consistently have a fever greater than 101, or the symptoms persist an additional 3 to 4 days, then start the antibiotic.    Follow up with PCP in 3-5 days.  Proceed to  ER if symptoms worsen.

## 2024-12-22 NOTE — PROGRESS NOTES
[facility]        NAME: Ajay Pino Sr. is a 84 y.o. male  : 1940    MRN: [id number]  DATE: 2024  TIME: 2:03 PM    Assessment and Plan   Viral upper respiratory tract infection [J06.9]  1. Viral upper respiratory tract infection  azithromycin (ZITHROMAX) 250 mg tablet            Patient Instructions     Plain Mucinex/guaifenesin to help decrease and thin the mucus.  You can continue the Chloraseptic spray, or try Cepacol throat lozenges for the sore throat.  Monitor temperature.  If he consistently have a fever greater than 101, or the symptoms persist an additional 3 to 4 days, then start the antibiotic.    Follow up with PCP in 3-5 days.  Proceed to  ER if symptoms worsen.    If tests have been performed at [facility], our office will contact you with results if changes need to be made to the care plan discussed with you at the visit.  [facility]    Chief Complaint     Chief Complaint   Patient presents with   • Sinusitis     Started 2 day ago with sinus congestion. Had a runny nose. Sore throat at night.          History of Present Illness       Sinusitis (Started 2 day ago with sinus congestion. Had a runny nose. Sore throat at night. )  Patient took some Chloraseptic throat spray that did help somewhat with the sore throat.  Denies fevers.    Sinusitis  This is a new problem. The current episode started in the past 7 days. The problem is unchanged. There has been no fever. Associated symptoms include congestion, coughing and a sore throat. Pertinent negatives include no chills or sinus pressure.       Review of Systems   Review of Systems   Constitutional:  Negative for chills and fever.   HENT:  Positive for congestion and sore throat. Negative for sinus pressure.    Respiratory:  Positive for cough.        Current Medications       Current Outpatient Medications:   •  amLODIPine (NORVASC) 5 mg tablet, Take 5 mg by mouth daily, Disp:  , Rfl:   •  atorvastatin (LIPITOR) 80 mg tablet, Take 1 tablet (80 mg total) by mouth every evening, Disp: 30 tablet, Rfl: 0  •  azithromycin (ZITHROMAX) 250 mg tablet, Take 2 tablets today then 1 tablet daily x 4 days, Disp: 6 tablet, Rfl: 0  •  furosemide (LASIX) 20 mg tablet, Take 20 mg by mouth every other day, Disp: , Rfl:   •  hydrALAZINE (APRESOLINE) 25 mg tablet, Take 25 mg by mouth, Disp: , Rfl:   •  isosorbide dinitrate (ISORDIL) 30 mg tablet, Take 30 mg by mouth daily, Disp: , Rfl:   •  metFORMIN (GLUCOPHAGE) 500 mg tablet, Take 500 mg by mouth 2 (two) times a day with meals , Disp: , Rfl:   •  metoprolol tartrate (LOPRESSOR) 25 mg tablet, Take 25 mg by mouth every 12 (twelve) hours , Disp: , Rfl:   •  spironolactone (ALDACTONE) 25 mg tablet, Take 25 mg by mouth, Disp: , Rfl:   •  tamsulosin (FLOMAX) 0.4 mg, Take 0.4 mg by mouth, Disp: , Rfl:   •  apixaban (ELIQUIS) 5 mg, Take 5 mg by mouth 2 (two) times a day, Disp: , Rfl:   •  Ascorbic Acid (vitamin C) 100 MG tablet, Take 100 mg by mouth daily   (Patient not taking: Reported on 12/22/2024), Disp: , Rfl:   •  cholecalciferol (VITAMIN D3) 1,000 units tablet, Take 1,000 Units by mouth daily (Patient not taking: Reported on 12/22/2024), Disp: , Rfl:   •  diazepam (VALIUM) 5 mg tablet, Take 1 tablet (5 mg total) by mouth 60 minutes pre-procedure May repeat x1 30 mins prior if needed. (Patient not taking: Reported on 1/22/2023), Disp: 2 tablet, Rfl: 0  •  Ginger, Zingiber officinalis, (GINGER ROOT PO), Take by mouth (Patient not taking: Reported on 12/22/2024), Disp: , Rfl:   •  LORazepam (Ativan) 0.5 mg tablet, Take 1 tab 1 hour prior to MRI for anxiety as needed. Repeat at 30 mins prior to MRI if needed. (Patient not taking: Reported on 12/22/2024), Disp: 3 tablet, Rfl: 0  •  Multiple Vitamins-Minerals (CENTRUM SILVER 50+MEN PO), Take 1 tablet by mouth daily (Patient not taking: Reported on 12/22/2024), Disp: , Rfl:   •  Multiple Vitamins-Minerals  "(Multivitamin Gummies Adult) CHEW, Chew 2 tablets daily (Patient not taking: Reported on 12/22/2024), Disp: , Rfl:   •  Omega-3 Fatty Acids (FISH OIL) 1200 MG CAPS, Take 1 capsule by mouth 2 (two) times a day (Patient not taking: Reported on 12/22/2024), Disp: , Rfl:   •  Zinc Sulfate (ZINC 15 PO), Take by mouth (Patient not taking: Reported on 12/22/2024), Disp: , Rfl:     Current Allergies     Allergies as of 12/22/2024 - Reviewed 12/22/2024   Allergen Reaction Noted   • Lisinopril Anaphylaxis 04/16/2012   • Bacitracin Rash 08/21/2014            The following portions of the patient's history were reviewed and updated as appropriate: allergies, current medications, past family history, past medical history, past social history, past surgical history and problem list.     Past Medical History:   Diagnosis Date   • Coronary artery disease    • Diabetes (HCC)    • Diabetes mellitus (HCC)    • Heart attack (HCC)    • Hyperlipidemia    • Hypertension    • Kidney stone    • TIA (transient ischemic attack)        Past Surgical History:   Procedure Laterality Date   • CARDIAC SURGERY     • CORONARY ANGIOPLASTY WITH STENT PLACEMENT     • REPLACEMENT TOTAL KNEE BILATERAL     • SINUS SURGERY     • TONSILLECTOMY         Family History   Problem Relation Age of Onset   • Alzheimer's disease Mother    • Pulmonary fibrosis Father          Medications have been verified.        Objective   /80   Pulse 82   Temp 97.7 °F (36.5 °C)   Resp 18   Ht 6' 3\" (1.905 m)   Wt (!) 138 kg (305 lb)   SpO2 99%   BMI 38.12 kg/m²   No LMP for male patient.       Physical Exam     Physical Exam  Vitals and nursing note reviewed.   Constitutional:       Appearance: Normal appearance. He is well-developed.   HENT:      Right Ear: Tympanic membrane and external ear normal.      Left Ear: Tympanic membrane and external ear normal.      Nose: Nose normal.      Mouth/Throat:      Mouth: Mucous membranes are moist.      Pharynx: No " oropharyngeal exudate.   Eyes:      Conjunctiva/sclera: Conjunctivae normal.   Cardiovascular:      Rate and Rhythm: Normal rate and regular rhythm.      Heart sounds: Normal heart sounds. No murmur heard.  Pulmonary:      Effort: Pulmonary effort is normal. No respiratory distress.      Breath sounds: Normal breath sounds. No wheezing or rales.   Chest:      Chest wall: No tenderness.   Musculoskeletal:      Cervical back: Normal range of motion and neck supple.   Lymphadenopathy:      Cervical: No cervical adenopathy.   Neurological:      Mental Status: He is alert.

## 2025-06-16 ENCOUNTER — OFFICE VISIT (OUTPATIENT)
Dept: URGENT CARE | Facility: CLINIC | Age: 85
End: 2025-06-16
Payer: MEDICARE

## 2025-06-16 VITALS
TEMPERATURE: 98 F | OXYGEN SATURATION: 96 % | HEART RATE: 78 BPM | SYSTOLIC BLOOD PRESSURE: 120 MMHG | RESPIRATION RATE: 18 BRPM | DIASTOLIC BLOOD PRESSURE: 70 MMHG

## 2025-06-16 DIAGNOSIS — L03.119 CELLULITIS OF LOWER EXTREMITY, UNSPECIFIED LATERALITY: Primary | ICD-10-CM

## 2025-06-16 PROCEDURE — G0463 HOSPITAL OUTPT CLINIC VISIT: HCPCS

## 2025-06-16 PROCEDURE — 99213 OFFICE O/P EST LOW 20 MIN: CPT

## 2025-06-16 RX ORDER — SULFAMETHOXAZOLE AND TRIMETHOPRIM 800; 160 MG/1; MG/1
1 TABLET ORAL EVERY 12 HOURS SCHEDULED
Qty: 10 TABLET | Refills: 0 | Status: SHIPPED | OUTPATIENT
Start: 2025-06-16 | End: 2025-06-21

## 2025-06-16 NOTE — PROGRESS NOTES
[facility]  Name: Ajay Pino Sr.      : 1940      MRN: [id number]  Encounter Provider: Lewis Madden PA-C  Encounter Date: 2025   Encounter department: [facility] [place]  :  Assessment & Plan  Cellulitis of lower extremity, unspecified laterality    Orders:    sulfamethoxazole-trimethoprim (BACTRIM DS) 800-160 mg per tablet; Take 1 tablet by mouth every 12 (twelve) hours for 5 days    Discussed with patient that physical exam findings significant for bilateral lower extremity cellulitis.  Will trial oral outpatient antibiotics X 5 days.  Advised patient that if Bactrim is not improving his symptoms with a 5-day course he should present to the ER for further evaluation management.  Also advised patient that if he begins having fevers, chills, lymphatic streaking, shortness of breath, chest pain he should also present to the ER for evaluation.      Patient Instructions  Bactrim as directed.  Clean with soap and water.  Keep wound dry, cover in dirty environments.  Monitor for increasing redness, swelling, or drainage.    Follow up with PCP in 3-5 days.  Proceed to  ER if symptoms worsen.    If tests are performed, our office will contact you with results only if changes need to made to the care plan discussed with you at the visit. [facility]    Chief Complaint:   Chief Complaint   Patient presents with    Cellulitis     Both legs 2 weeks      History of Present Illness   85-year-old male with past medical history of CAD, diabetes, hypertension, hyperlipidemia presenting with bilateral lower extremity redness and swelling X 2 weeks.  Patient notes that he gets recurrent cellulitis in bilateral legs annually, notes that this event is similar to his prior episodes of cellulitis.  Currently denying fevers, chills, chest pain, shortness of breath, wheezing, lymphatic streaking.          Review of Systems   Constitutional:  Negative  for chills and fever.   HENT:  Negative for ear pain and sore throat.    Eyes:  Negative for pain and visual disturbance.   Respiratory:  Negative for cough and shortness of breath.    Cardiovascular:  Positive for leg swelling. Negative for chest pain and palpitations.   Gastrointestinal:  Negative for abdominal pain and vomiting.   Genitourinary:  Negative for dysuria and hematuria.   Musculoskeletal:  Negative for arthralgias and back pain.   Skin:  Positive for color change. Negative for rash.   Neurological:  Negative for seizures and syncope.   All other systems reviewed and are negative.    Past Medical History   Past Medical History[1]  Past Surgical History[2]  Family History[3]  he reports that he has never smoked. He has never used smokeless tobacco. He reports that he does not drink alcohol and does not use drugs.  Current Outpatient Medications   Medication Instructions    amLODIPine (NORVASC) 5 mg, Daily    apixaban (ELIQUIS) 5 mg, Oral, 2 times daily    atorvastatin (LIPITOR) 80 mg, Oral, Every evening    cholecalciferol (VITAMIN D3) 1,000 Units, Daily    diazepam (VALIUM) 5 mg, Oral, 60 min pre-procedure, May repeat x1 30 mins prior if needed.    furosemide (LASIX) 20 mg, Every other day    Ginger, Zingiber officinalis, (GINGER ROOT PO) Take by mouth    hydrALAZINE (APRESOLINE) 25 mg    isosorbide dinitrate (ISORDIL) 30 mg, Daily    LORazepam (Ativan) 0.5 mg tablet Take 1 tab 1 hour prior to MRI for anxiety as needed. Repeat at 30 mins prior to MRI if needed.    metFORMIN (GLUCOPHAGE) 500 mg, 2 times daily with meals    metoprolol tartrate (LOPRESSOR) 25 mg, Every 12 hours scheduled    Multiple Vitamins-Minerals (CENTRUM SILVER 50+MEN PO) 1 tablet, Daily    Multiple Vitamins-Minerals (Multivitamin Gummies Adult) CHEW 2 tablets, Daily    Omega-3 Fatty Acids (FISH OIL) 1200 MG CAPS 1 capsule, 2 times daily    spironolactone (ALDACTONE) 25 mg    sulfamethoxazole-trimethoprim (BACTRIM DS) 800-160 mg per  "tablet 1 tablet, Oral, Every 12 hours scheduled    tamsulosin (FLOMAX) 0.4 mg    vitamin C 100 mg, Daily    Zinc Sulfate (ZINC 15 PO) Take by mouth   Allergies[4]     Objective   /70   Pulse 78   Temp 98 °F (36.7 °C)   Resp 18   SpO2 96%      Physical Exam  Vitals and nursing note reviewed.   Constitutional:       General: He is not in acute distress.     Appearance: He is well-developed.   HENT:      Head: Normocephalic and atraumatic.     Eyes:      Conjunctiva/sclera: Conjunctivae normal.       Cardiovascular:      Rate and Rhythm: Normal rate and regular rhythm.      Heart sounds: No murmur heard.  Pulmonary:      Effort: Pulmonary effort is normal. No respiratory distress.      Breath sounds: Normal breath sounds.   Abdominal:      Palpations: Abdomen is soft.      Tenderness: There is no abdominal tenderness.     Musculoskeletal:         General: No swelling.      Cervical back: Neck supple.      Right lower leg: Swelling and tenderness present. 2+ Edema present.      Left lower leg: Swelling and tenderness present. 2+ Edema present.     Skin:     General: Skin is warm and dry.      Capillary Refill: Capillary refill takes less than 2 seconds.      Findings: Erythema present.           Comments: Bilateral lower extremity erythema and warmth to the touch.     Neurological:      Mental Status: He is alert.     Psychiatric:         Mood and Affect: Mood normal.         Portions of the record may have been created with voice recognition software.  Occasional wrong word or \"sound a like\" substitutions may have occurred due to the inherent limitations of voice recognition software.  Read the chart carefully and recognize, using context, where substitutions have occurred.       [1]   Past Medical History:  Diagnosis Date    Coronary artery disease     Diabetes (HCC)     Diabetes mellitus (HCC)     Heart attack (HCC)     Hyperlipidemia     Hypertension     Kidney stone     TIA (transient ischemic attack)  "   [2]   Past Surgical History:  Procedure Laterality Date    CARDIAC SURGERY      CORONARY ANGIOPLASTY WITH STENT PLACEMENT      REPLACEMENT TOTAL KNEE BILATERAL      SINUS SURGERY      TONSILLECTOMY     [3]   Family History  Problem Relation Name Age of Onset    Alzheimer's disease Mother      Pulmonary fibrosis Father     [4]   Allergies  Allergen Reactions    Lisinopril Anaphylaxis     Anaphylaxis    Bacitracin Rash

## 2025-06-16 NOTE — PATIENT INSTRUCTIONS
Bactrim as directed.  Clean with soap and water.  Keep wound dry, cover in dirty environments.  Monitor for increasing redness, swelling, or drainage.